# Patient Record
Sex: FEMALE | Race: WHITE | NOT HISPANIC OR LATINO | URBAN - METROPOLITAN AREA
[De-identification: names, ages, dates, MRNs, and addresses within clinical notes are randomized per-mention and may not be internally consistent; named-entity substitution may affect disease eponyms.]

---

## 2019-01-02 ENCOUNTER — HOSPITAL ENCOUNTER (OUTPATIENT)
Dept: OTHER | Facility: HOSPITAL | Age: 59
Setting detail: RECURRING SERIES
Discharge: HOME | End: 2019-02-26
Attending: SPECIALIST | Admitting: SPECIALIST

## 2021-02-04 ENCOUNTER — ON CAMPUS - OUTPATIENT (OUTPATIENT)
Dept: URBAN - METROPOLITAN AREA HOSPITAL 77 | Facility: HOSPITAL | Age: 61
End: 2021-02-04
Payer: COMMERCIAL

## 2021-02-04 DIAGNOSIS — D12.0 BENIGN NEOPLASM OF CECUM: ICD-10-CM

## 2021-02-04 DIAGNOSIS — K63.5 POLYP OF COLON: ICD-10-CM

## 2021-02-04 DIAGNOSIS — D12.3 BENIGN NEOPLASM OF TRANSVERSE COLON: ICD-10-CM

## 2021-02-04 DIAGNOSIS — Z86.010 PERSONAL HISTORY OF COLONIC POLYPS: ICD-10-CM

## 2021-02-04 DIAGNOSIS — D12.2 BENIGN NEOPLASM OF ASCENDING COLON: ICD-10-CM

## 2021-02-04 PROCEDURE — 45385 COLONOSCOPY W/LESION REMOVAL: CPT | Mod: 33 | Performed by: INTERNAL MEDICINE

## 2021-02-04 PROCEDURE — 45380 COLONOSCOPY AND BIOPSY: CPT | Mod: 33,59 | Performed by: INTERNAL MEDICINE

## 2021-02-04 PROCEDURE — 45381 COLONOSCOPY SUBMUCOUS NJX: CPT | Mod: 33 | Performed by: INTERNAL MEDICINE

## 2021-05-10 ENCOUNTER — ON CAMPUS - OUTPATIENT (OUTPATIENT)
Dept: URBAN - METROPOLITAN AREA HOSPITAL 77 | Facility: HOSPITAL | Age: 61
End: 2021-05-10
Payer: COMMERCIAL

## 2021-05-10 DIAGNOSIS — D12.2 BENIGN NEOPLASM OF ASCENDING COLON: ICD-10-CM

## 2021-05-10 DIAGNOSIS — Z86.010 PERSONAL HISTORY OF COLONIC POLYPS: ICD-10-CM

## 2021-05-10 DIAGNOSIS — D12.4 BENIGN NEOPLASM OF DESCENDING COLON: ICD-10-CM

## 2021-05-10 PROCEDURE — 45385 COLONOSCOPY W/LESION REMOVAL: CPT | Mod: 33 | Performed by: INTERNAL MEDICINE

## 2023-01-01 ENCOUNTER — ON CAMPUS - OUTPATIENT (OUTPATIENT)
Dept: URBAN - METROPOLITAN AREA HOSPITAL 85 | Facility: HOSPITAL | Age: 63
End: 2023-01-01

## 2023-01-01 ENCOUNTER — INPATIENT HOSPITAL (OUTPATIENT)
Dept: URBAN - METROPOLITAN AREA HOSPITAL 76 | Facility: HOSPITAL | Age: 63
End: 2023-01-01

## 2023-01-01 ENCOUNTER — INPATIENT HOSPITAL (OUTPATIENT)
Dept: URBAN - METROPOLITAN AREA HOSPITAL 84 | Facility: HOSPITAL | Age: 63
End: 2023-01-01

## 2023-01-01 DIAGNOSIS — K22.89 OTHER SPECIFIED DISEASE OF ESOPHAGUS: ICD-10-CM

## 2023-01-01 DIAGNOSIS — K55.20 ANGIODYSPLASIA OF COLON WITHOUT HEMORRHAGE: ICD-10-CM

## 2023-01-01 DIAGNOSIS — R68.81 EARLY SATIETY: ICD-10-CM

## 2023-01-01 DIAGNOSIS — R63.4 ABNORMAL WEIGHT LOSS: ICD-10-CM

## 2023-01-01 DIAGNOSIS — K92.1 MELENA: ICD-10-CM

## 2023-01-01 DIAGNOSIS — D64.9 ANEMIA, UNSPECIFIED: ICD-10-CM

## 2023-01-01 DIAGNOSIS — K31.89 OTHER DISEASES OF STOMACH AND DUODENUM: ICD-10-CM

## 2023-01-01 DIAGNOSIS — R13.10 DYSPHAGIA, UNSPECIFIED: ICD-10-CM

## 2023-01-01 DIAGNOSIS — D72.829 ELEVATED WHITE BLOOD CELL COUNT, UNSPECIFIED: ICD-10-CM

## 2023-01-01 DIAGNOSIS — R74.01 ELEVATION OF LEVELS OF LIVER TRANSAMINASE LEVELS: ICD-10-CM

## 2023-01-01 DIAGNOSIS — R74.8 ABNORMAL LEVELS OF OTHER SERUM ENZYMES: ICD-10-CM

## 2023-01-01 DIAGNOSIS — E87.6 HYPOKALEMIA: ICD-10-CM

## 2023-01-01 DIAGNOSIS — R93.3 ABNORMAL FINDINGS ON DIAGNOSTIC IMAGING OF OTHER PARTS OF DI: ICD-10-CM

## 2023-01-01 DIAGNOSIS — D62 ACUTE POSTHEMORRHAGIC ANEMIA: ICD-10-CM

## 2023-01-01 DIAGNOSIS — K44.9 DIAPHRAGMATIC HERNIA WITHOUT OBSTRUCTION OR GANGRENE: ICD-10-CM

## 2023-01-01 DIAGNOSIS — R63.30 FEEDING DIFFICULTIES, UNSPECIFIED: ICD-10-CM

## 2023-01-01 PROCEDURE — 44369 SMALL BOWEL ENDOSCOPY: CPT | Performed by: INTERNAL MEDICINE

## 2023-01-01 PROCEDURE — 99222 1ST HOSP IP/OBS MODERATE 55: CPT | Performed by: INTERNAL MEDICINE

## 2023-01-01 PROCEDURE — 99232 SBSQ HOSP IP/OBS MODERATE 35: CPT | Performed by: NURSE PRACTITIONER

## 2023-01-01 PROCEDURE — 43239 EGD BIOPSY SINGLE/MULTIPLE: CPT | Performed by: NURSE PRACTITIONER

## 2023-01-01 PROCEDURE — 99222 1ST HOSP IP/OBS MODERATE 55: CPT

## 2023-09-03 ENCOUNTER — APPOINTMENT (OUTPATIENT)
Dept: CT IMAGING | Facility: HOSPITAL | Age: 63
DRG: 176 | End: 2023-09-03
Payer: COMMERCIAL

## 2023-09-03 ENCOUNTER — HOSPITAL ENCOUNTER (INPATIENT)
Facility: HOSPITAL | Age: 63
LOS: 2 days | Discharge: HOME | DRG: 176 | End: 2023-09-05
Attending: STUDENT IN AN ORGANIZED HEALTH CARE EDUCATION/TRAINING PROGRAM | Admitting: STUDENT IN AN ORGANIZED HEALTH CARE EDUCATION/TRAINING PROGRAM
Payer: COMMERCIAL

## 2023-09-03 DIAGNOSIS — I26.99 BILATERAL PULMONARY EMBOLISM: Primary | ICD-10-CM

## 2023-09-03 DIAGNOSIS — E87.6 HYPOKALEMIA: ICD-10-CM

## 2023-09-03 PROBLEM — C81.90 HODGKIN'S DISEASE IN REMISSION: Status: ACTIVE | Noted: 2023-09-03

## 2023-09-03 PROBLEM — F41.8 ANXIETY ASSOCIATED WITH DEPRESSION: Status: ACTIVE | Noted: 2023-09-03

## 2023-09-03 PROBLEM — E78.5 HLD (HYPERLIPIDEMIA): Status: ACTIVE | Noted: 2023-09-03

## 2023-09-03 PROBLEM — K52.9 COLITIS: Status: ACTIVE | Noted: 2023-09-03

## 2023-09-03 PROBLEM — N39.0 ACUTE UTI (URINARY TRACT INFECTION): Status: ACTIVE | Noted: 2023-09-03

## 2023-09-03 PROBLEM — I10 HTN (HYPERTENSION): Status: ACTIVE | Noted: 2023-09-03

## 2023-09-03 PROBLEM — Z86.73 HISTORY OF CVA IN ADULTHOOD: Status: ACTIVE | Noted: 2023-09-03

## 2023-09-03 PROBLEM — R13.10 DYSPHAGIA: Status: ACTIVE | Noted: 2023-09-03

## 2023-09-03 PROBLEM — K21.9 GERD (GASTROESOPHAGEAL REFLUX DISEASE): Status: ACTIVE | Noted: 2023-09-03

## 2023-09-03 LAB
ALBUMIN SERPL-MCNC: 2.7 G/DL (ref 3.5–5.2)
ALBUMIN/GLOB SERPL: 0.8 G/DL
ALP SERPL-CCNC: 112 U/L (ref 39–117)
ALT SERPL W P-5'-P-CCNC: 16 U/L (ref 1–33)
ANION GAP SERPL CALCULATED.3IONS-SCNC: 8.4 MMOL/L (ref 5–15)
ANISOCYTOSIS BLD QL: ABNORMAL
APTT PPP: 30.5 SECONDS (ref 61–76.5)
AST SERPL-CCNC: 20 U/L (ref 1–32)
BILIRUB SERPL-MCNC: 0.5 MG/DL (ref 0–1.2)
BILIRUB UR QL STRIP: ABNORMAL
BUN SERPL-MCNC: 11 MG/DL (ref 8–23)
BUN/CREAT SERPL: 13.3 (ref 7–25)
BURR CELLS BLD QL SMEAR: ABNORMAL
CALCIUM SPEC-SCNC: 8.1 MG/DL (ref 8.6–10.5)
CHLORIDE SERPL-SCNC: 100 MMOL/L (ref 98–107)
CLARITY UR: ABNORMAL
CO2 SERPL-SCNC: 24.6 MMOL/L (ref 22–29)
COLOR UR: ABNORMAL
CREAT SERPL-MCNC: 0.83 MG/DL (ref 0.57–1)
D DIMER PPP FEU-MCNC: 0.95 MCGFEU/ML (ref 0–0.64)
DEPRECATED RDW RBC AUTO: 49.7 FL (ref 37–54)
EGFRCR SERPLBLD CKD-EPI 2021: 78.8 ML/MIN/1.73
ERYTHROCYTE [DISTWIDTH] IN BLOOD BY AUTOMATED COUNT: 15.4 % (ref 12.3–15.4)
FLUAV SUBTYP SPEC NAA+PROBE: NOT DETECTED
FLUBV RNA ISLT QL NAA+PROBE: NOT DETECTED
GEN 5 2HR TROPONIN T REFLEX: 18 NG/L
GLOBULIN UR ELPH-MCNC: 3.3 GM/DL
GLUCOSE SERPL-MCNC: 112 MG/DL (ref 65–99)
GLUCOSE UR STRIP-MCNC: NEGATIVE MG/DL
HCT VFR BLD AUTO: 37.2 % (ref 34–46.6)
HGB BLD-MCNC: 12 G/DL (ref 12–15.9)
HGB UR QL STRIP.AUTO: NEGATIVE
INR PPP: 1 (ref 0.9–1.1)
KETONES UR QL STRIP: ABNORMAL
LEUKOCYTE ESTERASE UR QL STRIP.AUTO: NEGATIVE
LIPASE SERPL-CCNC: 24 U/L (ref 13–60)
LYMPHOCYTES # BLD MANUAL: 9.86 10*3/MM3 (ref 0.7–3.1)
LYMPHOCYTES NFR BLD MANUAL: 2 % (ref 5–12)
MAGNESIUM SERPL-MCNC: 1.8 MG/DL (ref 1.6–2.4)
MCH RBC QN AUTO: 28.4 PG (ref 26.6–33)
MCHC RBC AUTO-ENTMCNC: 32.3 G/DL (ref 31.5–35.7)
MCV RBC AUTO: 87.9 FL (ref 79–97)
MONOCYTES # BLD: 0.23 10*3/MM3 (ref 0.1–0.9)
NEUTROPHILS # BLD AUTO: 1.51 10*3/MM3 (ref 1.7–7)
NEUTROPHILS NFR BLD MANUAL: 13 % (ref 42.7–76)
NITRITE UR QL STRIP: POSITIVE
NT-PROBNP SERPL-MCNC: 256.7 PG/ML (ref 0–900)
PATHOLOGY REVIEW: YES
PH UR STRIP.AUTO: 7 [PH] (ref 5–8)
PLAT MORPH BLD: NORMAL
PLATELET # BLD AUTO: 330 10*3/MM3 (ref 140–450)
PMV BLD AUTO: 10.1 FL (ref 6–12)
POTASSIUM SERPL-SCNC: 2.9 MMOL/L (ref 3.5–5.2)
PROT SERPL-MCNC: 6 G/DL (ref 6–8.5)
PROT UR QL STRIP: ABNORMAL
PROTHROMBIN TIME: 14.3 SECONDS
QT INTERVAL: 373 MS
QTC INTERVAL: 495 MS
RBC # BLD AUTO: 4.23 10*6/MM3 (ref 3.77–5.28)
SARS-COV-2 RNA RESP QL NAA+PROBE: NOT DETECTED
SMUDGE CELLS BLD QL SMEAR: ABNORMAL
SODIUM SERPL-SCNC: 133 MMOL/L (ref 136–145)
SP GR UR STRIP: 1.01 (ref 1–1.03)
TROPONIN T DELTA: -1 NG/L
TROPONIN T SERPL HS-MCNC: 19 NG/L
UROBILINOGEN UR QL STRIP: ABNORMAL
VARIANT LYMPHS NFR BLD MANUAL: 2 % (ref 0–5)
VARIANT LYMPHS NFR BLD MANUAL: 83 % (ref 19.6–45.3)
WBC NRBC COR # BLD: 11.6 10*3/MM3 (ref 3.4–10.8)

## 2023-09-03 PROCEDURE — 71275 CT ANGIOGRAPHY CHEST: CPT

## 2023-09-03 PROCEDURE — 84484 ASSAY OF TROPONIN QUANT: CPT | Performed by: STUDENT IN AN ORGANIZED HEALTH CARE EDUCATION/TRAINING PROGRAM

## 2023-09-03 PROCEDURE — 25010000002 CEFTRIAXONE PER 250 MG: Performed by: STUDENT IN AN ORGANIZED HEALTH CARE EDUCATION/TRAINING PROGRAM

## 2023-09-03 PROCEDURE — 81003 URINALYSIS AUTO W/O SCOPE: CPT | Performed by: STUDENT IN AN ORGANIZED HEALTH CARE EDUCATION/TRAINING PROGRAM

## 2023-09-03 PROCEDURE — 85025 COMPLETE CBC W/AUTO DIFF WBC: CPT | Performed by: STUDENT IN AN ORGANIZED HEALTH CARE EDUCATION/TRAINING PROGRAM

## 2023-09-03 PROCEDURE — 83880 ASSAY OF NATRIURETIC PEPTIDE: CPT | Performed by: STUDENT IN AN ORGANIZED HEALTH CARE EDUCATION/TRAINING PROGRAM

## 2023-09-03 PROCEDURE — 85730 THROMBOPLASTIN TIME PARTIAL: CPT | Performed by: STUDENT IN AN ORGANIZED HEALTH CARE EDUCATION/TRAINING PROGRAM

## 2023-09-03 PROCEDURE — 85610 PROTHROMBIN TIME: CPT

## 2023-09-03 PROCEDURE — 87636 SARSCOV2 & INF A&B AMP PRB: CPT | Performed by: STUDENT IN AN ORGANIZED HEALTH CARE EDUCATION/TRAINING PROGRAM

## 2023-09-03 PROCEDURE — 25510000001 IOPAMIDOL PER 1 ML: Performed by: STUDENT IN AN ORGANIZED HEALTH CARE EDUCATION/TRAINING PROGRAM

## 2023-09-03 PROCEDURE — 83690 ASSAY OF LIPASE: CPT | Performed by: STUDENT IN AN ORGANIZED HEALTH CARE EDUCATION/TRAINING PROGRAM

## 2023-09-03 PROCEDURE — 83735 ASSAY OF MAGNESIUM: CPT | Performed by: STUDENT IN AN ORGANIZED HEALTH CARE EDUCATION/TRAINING PROGRAM

## 2023-09-03 PROCEDURE — 93005 ELECTROCARDIOGRAM TRACING: CPT | Performed by: STUDENT IN AN ORGANIZED HEALTH CARE EDUCATION/TRAINING PROGRAM

## 2023-09-03 PROCEDURE — 0 POTASSIUM CHLORIDE 10 MEQ/100ML SOLUTION: Performed by: STUDENT IN AN ORGANIZED HEALTH CARE EDUCATION/TRAINING PROGRAM

## 2023-09-03 PROCEDURE — 74177 CT ABD & PELVIS W/CONTRAST: CPT

## 2023-09-03 PROCEDURE — 85007 BL SMEAR W/DIFF WBC COUNT: CPT | Performed by: STUDENT IN AN ORGANIZED HEALTH CARE EDUCATION/TRAINING PROGRAM

## 2023-09-03 PROCEDURE — 36415 COLL VENOUS BLD VENIPUNCTURE: CPT

## 2023-09-03 PROCEDURE — 85379 FIBRIN DEGRADATION QUANT: CPT | Performed by: STUDENT IN AN ORGANIZED HEALTH CARE EDUCATION/TRAINING PROGRAM

## 2023-09-03 PROCEDURE — 99285 EMERGENCY DEPT VISIT HI MDM: CPT

## 2023-09-03 PROCEDURE — 80053 COMPREHEN METABOLIC PANEL: CPT | Performed by: STUDENT IN AN ORGANIZED HEALTH CARE EDUCATION/TRAINING PROGRAM

## 2023-09-03 PROCEDURE — 25010000002 HEPARIN (PORCINE) 25000-0.45 UT/250ML-% SOLUTION: Performed by: STUDENT IN AN ORGANIZED HEALTH CARE EDUCATION/TRAINING PROGRAM

## 2023-09-03 RX ORDER — ATORVASTATIN CALCIUM 40 MG/1
40 TABLET, FILM COATED ORAL NIGHTLY
COMMUNITY

## 2023-09-03 RX ORDER — CETIRIZINE HYDROCHLORIDE 10 MG/1
10 TABLET ORAL DAILY
COMMUNITY

## 2023-09-03 RX ORDER — HEPARIN SODIUM 10000 [USP'U]/100ML
17 INJECTION, SOLUTION INTRAVENOUS
Status: DISPENSED | OUTPATIENT
Start: 2023-09-03 | End: 2023-09-04

## 2023-09-03 RX ORDER — SODIUM CHLORIDE 9 MG/ML
40 INJECTION, SOLUTION INTRAVENOUS AS NEEDED
Status: DISCONTINUED | OUTPATIENT
Start: 2023-09-03 | End: 2023-09-05 | Stop reason: HOSPADM

## 2023-09-03 RX ORDER — POTASSIUM CHLORIDE 7.45 MG/ML
10 INJECTION INTRAVENOUS ONCE
Status: COMPLETED | OUTPATIENT
Start: 2023-09-03 | End: 2023-09-03

## 2023-09-03 RX ORDER — ONDANSETRON 2 MG/ML
4 INJECTION INTRAMUSCULAR; INTRAVENOUS EVERY 6 HOURS PRN
Status: DISCONTINUED | OUTPATIENT
Start: 2023-09-03 | End: 2023-09-05 | Stop reason: HOSPADM

## 2023-09-03 RX ORDER — SERTRALINE HYDROCHLORIDE 100 MG/1
100 TABLET, FILM COATED ORAL DAILY
COMMUNITY

## 2023-09-03 RX ORDER — POLYETHYLENE GLYCOL 3350 17 G/17G
17 POWDER, FOR SOLUTION ORAL DAILY PRN
Status: DISCONTINUED | OUTPATIENT
Start: 2023-09-03 | End: 2023-09-05 | Stop reason: HOSPADM

## 2023-09-03 RX ORDER — SODIUM CHLORIDE 0.9 % (FLUSH) 0.9 %
10 SYRINGE (ML) INJECTION AS NEEDED
Status: DISCONTINUED | OUTPATIENT
Start: 2023-09-03 | End: 2023-09-05 | Stop reason: HOSPADM

## 2023-09-03 RX ORDER — PANTOPRAZOLE SODIUM 40 MG/10ML
40 INJECTION, POWDER, LYOPHILIZED, FOR SOLUTION INTRAVENOUS
Status: DISCONTINUED | OUTPATIENT
Start: 2023-09-04 | End: 2023-09-05

## 2023-09-03 RX ORDER — BISACODYL 5 MG/1
5 TABLET, DELAYED RELEASE ORAL DAILY PRN
Status: DISCONTINUED | OUTPATIENT
Start: 2023-09-03 | End: 2023-09-05 | Stop reason: HOSPADM

## 2023-09-03 RX ORDER — SODIUM CHLORIDE 0.9 % (FLUSH) 0.9 %
10 SYRINGE (ML) INJECTION EVERY 12 HOURS SCHEDULED
Status: DISCONTINUED | OUTPATIENT
Start: 2023-09-03 | End: 2023-09-05 | Stop reason: HOSPADM

## 2023-09-03 RX ORDER — POTASSIUM CHLORIDE 20 MEQ/1
40 TABLET, EXTENDED RELEASE ORAL ONCE
Status: COMPLETED | OUTPATIENT
Start: 2023-09-03 | End: 2023-09-03

## 2023-09-03 RX ORDER — CLOPIDOGREL BISULFATE 75 MG/1
75 TABLET ORAL DAILY
COMMUNITY

## 2023-09-03 RX ORDER — OXYCODONE HYDROCHLORIDE 5 MG/1
5 TABLET ORAL EVERY 4 HOURS PRN
Status: DISCONTINUED | OUTPATIENT
Start: 2023-09-03 | End: 2023-09-05 | Stop reason: HOSPADM

## 2023-09-03 RX ORDER — LISINOPRIL AND HYDROCHLOROTHIAZIDE 20; 12.5 MG/1; MG/1
1 TABLET ORAL DAILY
COMMUNITY

## 2023-09-03 RX ORDER — SODIUM CHLORIDE, SODIUM LACTATE, POTASSIUM CHLORIDE, CALCIUM CHLORIDE 600; 310; 30; 20 MG/100ML; MG/100ML; MG/100ML; MG/100ML
50 INJECTION, SOLUTION INTRAVENOUS CONTINUOUS
Status: DISCONTINUED | OUTPATIENT
Start: 2023-09-03 | End: 2023-09-05 | Stop reason: HOSPADM

## 2023-09-03 RX ORDER — BISACODYL 10 MG
10 SUPPOSITORY, RECTAL RECTAL DAILY PRN
Status: DISCONTINUED | OUTPATIENT
Start: 2023-09-03 | End: 2023-09-05 | Stop reason: HOSPADM

## 2023-09-03 RX ORDER — TEMAZEPAM 15 MG/1
15 CAPSULE ORAL NIGHTLY PRN
Status: DISCONTINUED | OUTPATIENT
Start: 2023-09-03 | End: 2023-09-05 | Stop reason: HOSPADM

## 2023-09-03 RX ORDER — ONDANSETRON 4 MG/1
4 TABLET, FILM COATED ORAL EVERY 6 HOURS PRN
Status: DISCONTINUED | OUTPATIENT
Start: 2023-09-03 | End: 2023-09-05 | Stop reason: HOSPADM

## 2023-09-03 RX ORDER — AMOXICILLIN 250 MG
2 CAPSULE ORAL 2 TIMES DAILY
Status: DISCONTINUED | OUTPATIENT
Start: 2023-09-03 | End: 2023-09-05 | Stop reason: HOSPADM

## 2023-09-03 RX ORDER — FUROSEMIDE 10 MG/ML
40 INJECTION INTRAMUSCULAR; INTRAVENOUS DAILY
Status: COMPLETED | OUTPATIENT
Start: 2023-09-04 | End: 2023-09-05

## 2023-09-03 RX ORDER — ASPIRIN 81 MG/1
81 TABLET ORAL DAILY
Status: ON HOLD | COMMUNITY
End: 2023-09-04

## 2023-09-03 RX ORDER — LABETALOL HYDROCHLORIDE 5 MG/ML
10 INJECTION, SOLUTION INTRAVENOUS EVERY 4 HOURS PRN
Status: DISCONTINUED | OUTPATIENT
Start: 2023-09-03 | End: 2023-09-05 | Stop reason: HOSPADM

## 2023-09-03 RX ORDER — ACETAMINOPHEN 325 MG/1
650 TABLET ORAL EVERY 4 HOURS PRN
Status: DISCONTINUED | OUTPATIENT
Start: 2023-09-03 | End: 2023-09-05 | Stop reason: HOSPADM

## 2023-09-03 RX ADMIN — POTASSIUM CHLORIDE 40 MEQ: 1500 TABLET, EXTENDED RELEASE ORAL at 17:02

## 2023-09-03 RX ADMIN — CEFTRIAXONE 1000 MG: 1 INJECTION, POWDER, FOR SOLUTION INTRAMUSCULAR; INTRAVENOUS at 19:13

## 2023-09-03 RX ADMIN — POTASSIUM CHLORIDE 10 MEQ: 7.46 INJECTION, SOLUTION INTRAVENOUS at 17:03

## 2023-09-03 RX ADMIN — SODIUM CHLORIDE 1000 ML: 9 INJECTION, SOLUTION INTRAVENOUS at 16:22

## 2023-09-03 RX ADMIN — Medication 10 ML: at 22:51

## 2023-09-03 RX ADMIN — SENNOSIDES AND DOCUSATE SODIUM 2 TABLET: 50; 8.6 TABLET ORAL at 22:50

## 2023-09-03 RX ADMIN — HEPARIN SODIUM 17 UNITS/KG/HR: 10000 INJECTION, SOLUTION INTRAVENOUS at 18:03

## 2023-09-03 RX ADMIN — IOPAMIDOL 100 ML: 755 INJECTION, SOLUTION INTRAVENOUS at 17:11

## 2023-09-03 RX ADMIN — ALUMINUM HYDROXIDE, MAGNESIUM HYDROXIDE, AND DIMETHICONE: 400; 400; 40 SUSPENSION ORAL at 22:51

## 2023-09-03 NOTE — [facility] PROVIDER NOTE
Subjective   History of Present Illness  Patient is a 63-year-old female who presents emergency department for multiple complaints including fatigue, decreased appetite, weight loss.  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  Patient states for the last 6 weeks she has not been feeling well.  She states she has had decreased appetite, 18 pound weight loss, fatigue, intermittent episodes of vomiting and diarrhea.  She denies fever, chills, chest pain.  She does report some exertional dyspnea and shortness of breath, but denies cough, fever, chills, chest pain.  Patient denies any history of DVT/PE.  She does report some increased swelling in her lower legs, but she attributes that to not being able to take her home medications due to her decreased appetite.    Review of Systems   Constitutional:  Positive for fatigue and unexpected weight change. Negative for activity change and fever.   HENT:  Negative for congestion, rhinorrhea and sore throat.    Respiratory:  Positive for shortness of breath. Negative for cough.    Cardiovascular:  Negative for chest pain.   Gastrointestinal:  Positive for abdominal pain, diarrhea and nausea. Negative for vomiting.   Genitourinary:  Negative for dysuria.   Musculoskeletal:  Negative for back pain and myalgias.   Skin:  Negative for rash.   Neurological:  Negative for dizziness, light-headedness and headaches.   Psychiatric/Behavioral:  Negative for confusion.      History reviewed. No pertinent past medical history.    No Known Allergies    History reviewed. No pertinent surgical history.    History reviewed. No pertinent family history.    Social History     Socioeconomic History    Marital status:    Tobacco Use    Smoking status: Never    Smokeless tobacco: Never   Vaping Use    Vaping Use: Never used   Substance and Sexual Activity    Alcohol use: Never    Drug use: Never    Sexual  activity: Defer           Objective   Physical Exam  Vitals and nursing note reviewed.   Constitutional:       General: She is not in acute distress.     Appearance: Normal appearance. She is obese. She is not ill-appearing.   HENT:      Head: Normocephalic and atraumatic.      Right Ear: Tympanic membrane normal.      Left Ear: Tympanic membrane normal.      Nose: Nose normal. No congestion.      Mouth/Throat:      Mouth: Mucous membranes are moist.      Pharynx: No oropharyngeal exudate.   Eyes:      Conjunctiva/sclera: Conjunctivae normal.   Cardiovascular:      Rate and Rhythm: Regular rhythm. Tachycardia present. FrequentExtrasystoles are present.     Heart sounds: No murmur heard.  Pulmonary:      Effort: Pulmonary effort is normal.      Breath sounds: No wheezing, rhonchi or rales.   Abdominal:      General: Abdomen is flat.      Palpations: Abdomen is soft.      Tenderness: There is no abdominal tenderness. There is no guarding or rebound.   Musculoskeletal:         General: No swelling or tenderness. Normal range of motion.      Cervical back: Normal range of motion and neck supple.      Right lower le+ Pitting Edema present.      Left lower le+ Pitting Edema present.   Skin:     General: Skin is warm and dry.      Findings: No rash.   Neurological:      General: No focal deficit present.      Mental Status: She is alert and oriented to person, place, and time.       Procedures           ED Course  ED Course as of 23 1902   Sun Sep 03, 2023   1601 ECG 12 Lead Other; fatigue  Sinus tachycardia rate 106, borderline left axis deviation, PVCs, right bundle branch block, ST depression anterolateral leads. [CO]   1611 WBC(!): 11.60 [CO]   1611 Hemoglobin: 12.0 [CO]   1641 Nitrite, UA(!): Positive [CO]   1642 Leukocytes, UA: Negative [CO]   1737 CT Angiogram Chest Pulmonary Embolism  Impression:     1. Multiple bilateral pulmonary emboli. No evidence of right heart strain.  2. Noncalcified 12 mm  nodule within the left lower lobe. PET/CT scan would be recommended for further evaluation [CO]   1753 CT Abdomen Pelvis With Contrast  Impression:     1. Multiple lower lobe pulmonary emboli. 212 mm noncalcified pulmonary nodule within the left lower lobe. PET/CT scan would be recommended for further evaluation.  3. Bilateral adrenal nodules which are statistically adenomas however given the presence of the pulmonary nodule, these can be evaluated on future PET/CT.  4. Segmental circumferential bowel wall thickening involving the distal descending colon. Colon proximal to this is mildly distended. This could represent a short segment stricture. Consider colonoscopy for further evaluation. Additionally there is bowel   wall thickening involving the splenic flexure of the colon consistent with changes of colitis which may be infectious or inflammatory in etiology. [CO]      ED Course User Index  [CO] CidraJustine, DO                                           Medical Decision Making  HAS-BLED Score for Major Bleeding Risk - MDCalc  Calculated on Sep 03 2023 5:49 PM  2 points -> Risk was 4.1% in one validation study (Lip 2011) and 1.88 bleeds per 100 patient-years in another validation study (Pisters 2010). Anticoagulation can be considered, however patient does have moderate risk for major bleeding (~2/100 patient-years).    Patient is a 63-year-old female presented to the emergency department for multiple complaints.  HPI as listed above.  On arrival she is afebrile, mildly tachycardic but otherwise hemodynamically stable in no acute distress.  Physical examination she is obese, no focal findings other than some mild lower extremity bilateral edema.  Patient placed on cardiac monitor, IV established.  Differential diagnosis includes but is not limited to CHF exacerbation, pneumonia, gastroenteritis COVID, flu.  EKG is tachycardia with no ischemic changes.  Lab work showed hypokalemia of 2.9, this was replaced both  p.o. and IV.  Normal kidney function.  Initial troponin is 10, repeat is 18.  BNP is in the 200s and is not consistent for acute CHF exacerbation.  D-dimer was elevated, CTA was obtained that does show bilateral pulmonary embolism without evidence of right heart strain.  Given BNP is not elevated this also goes along with no right ventricular heart strain.  Has bled score is moderate risk, but will place on heparin and admit for further work-up for bilateral PEs.        Problems Addressed:  Bilateral pulmonary embolism: complicated acute illness or injury  Hypokalemia: complicated acute illness or injury    Amount and/or Complexity of Data Reviewed  Labs: ordered. Decision-making details documented in ED Course.  Radiology: ordered. Decision-making details documented in ED Course.  ECG/medicine tests: ordered. Decision-making details documented in ED Course.    Risk  Prescription drug management.  Decision regarding hospitalization.        Final diagnoses:   Bilateral pulmonary embolism   Hypokalemia       ED Disposition  ED Disposition       ED Disposition   Decision to Admit    Condition   --    Comment   Level of Care: Telemetry [5]   Admitting Physician: TATIANA CROSS [[id number]]   Attending Physician: TATIANA CROSS [[id number]]   Patient Class: Inpatient [101]                 No follow-up provider specified.       Medication List      No changes were made to your prescriptions during this visit.

## 2023-09-04 ENCOUNTER — APPOINTMENT (OUTPATIENT)
Dept: CARDIOLOGY | Facility: HOSPITAL | Age: 63
DRG: 176 | End: 2023-09-04
Payer: COMMERCIAL

## 2023-09-04 PROBLEM — R91.1 PULMONARY NODULE 1 CM OR GREATER IN DIAMETER: Status: ACTIVE | Noted: 2023-09-04

## 2023-09-04 PROBLEM — E27.8 ADRENAL NODULE: Status: ACTIVE | Noted: 2023-09-04

## 2023-09-04 LAB
ALBUMIN SERPL-MCNC: 2.4 G/DL (ref 3.5–5.2)
ALBUMIN/GLOB SERPL: 0.8 G/DL
ALP SERPL-CCNC: 99 U/L (ref 39–117)
ALT SERPL W P-5'-P-CCNC: 16 U/L (ref 1–33)
ANION GAP SERPL CALCULATED.3IONS-SCNC: 9 MMOL/L (ref 5–15)
ANISOCYTOSIS BLD QL: ABNORMAL
APTT PPP: 137.1 SECONDS (ref 61–76.5)
APTT PPP: 68 SECONDS (ref 61–76.5)
APTT PPP: 70.4 SECONDS (ref 61–76.5)
AST SERPL-CCNC: 20 U/L (ref 1–32)
BH CV LOWER VASCULAR LEFT COMMON FEMORAL AUGMENT: NORMAL
BH CV LOWER VASCULAR LEFT COMMON FEMORAL COMPETENT: NORMAL
BH CV LOWER VASCULAR LEFT COMMON FEMORAL COMPRESS: NORMAL
BH CV LOWER VASCULAR LEFT COMMON FEMORAL PHASIC: NORMAL
BH CV LOWER VASCULAR LEFT COMMON FEMORAL SPONT: NORMAL
BH CV LOWER VASCULAR LEFT DISTAL FEMORAL COMPRESS: NORMAL
BH CV LOWER VASCULAR LEFT GASTRONEMIUS COMPRESS: NORMAL
BH CV LOWER VASCULAR LEFT GREATER SAPH AK COMPRESS: NORMAL
BH CV LOWER VASCULAR LEFT GREATER SAPH BK COMPRESS: NORMAL
BH CV LOWER VASCULAR LEFT LESSER SAPH COMPRESS: NORMAL
BH CV LOWER VASCULAR LEFT MID FEMORAL AUGMENT: NORMAL
BH CV LOWER VASCULAR LEFT MID FEMORAL COMPETENT: NORMAL
BH CV LOWER VASCULAR LEFT MID FEMORAL COMPRESS: NORMAL
BH CV LOWER VASCULAR LEFT MID FEMORAL PHASIC: NORMAL
BH CV LOWER VASCULAR LEFT MID FEMORAL SPONT: NORMAL
BH CV LOWER VASCULAR LEFT PERONEAL COMPRESS: NORMAL
BH CV LOWER VASCULAR LEFT POPLITEAL AUGMENT: NORMAL
BH CV LOWER VASCULAR LEFT POPLITEAL COMPETENT: NORMAL
BH CV LOWER VASCULAR LEFT POPLITEAL COMPRESS: NORMAL
BH CV LOWER VASCULAR LEFT POPLITEAL PHASIC: NORMAL
BH CV LOWER VASCULAR LEFT POPLITEAL SPONT: NORMAL
BH CV LOWER VASCULAR LEFT POSTERIOR TIBIAL COMPRESS: NORMAL
BH CV LOWER VASCULAR LEFT PROXIMAL FEMORAL COMPRESS: NORMAL
BH CV LOWER VASCULAR LEFT SAPHENOFEMORAL JUNCTION COMPRESS: NORMAL
BH CV LOWER VASCULAR RIGHT COMMON FEMORAL AUGMENT: NORMAL
BH CV LOWER VASCULAR RIGHT COMMON FEMORAL COMPETENT: NORMAL
BH CV LOWER VASCULAR RIGHT COMMON FEMORAL COMPRESS: NORMAL
BH CV LOWER VASCULAR RIGHT COMMON FEMORAL PHASIC: NORMAL
BH CV LOWER VASCULAR RIGHT COMMON FEMORAL SPONT: NORMAL
BH CV LOWER VASCULAR RIGHT DISTAL FEMORAL COMPRESS: NORMAL
BH CV LOWER VASCULAR RIGHT GASTRONEMIUS COMPRESS: NORMAL
BH CV LOWER VASCULAR RIGHT GREATER SAPH AK COMPRESS: NORMAL
BH CV LOWER VASCULAR RIGHT GREATER SAPH BK COMPRESS: NORMAL
BH CV LOWER VASCULAR RIGHT LESSER SAPH COMPRESS: NORMAL
BH CV LOWER VASCULAR RIGHT MID FEMORAL AUGMENT: NORMAL
BH CV LOWER VASCULAR RIGHT MID FEMORAL COMPETENT: NORMAL
BH CV LOWER VASCULAR RIGHT MID FEMORAL COMPRESS: NORMAL
BH CV LOWER VASCULAR RIGHT MID FEMORAL PHASIC: NORMAL
BH CV LOWER VASCULAR RIGHT MID FEMORAL SPONT: NORMAL
BH CV LOWER VASCULAR RIGHT PERONEAL COMPRESS: NORMAL
BH CV LOWER VASCULAR RIGHT POPLITEAL AUGMENT: NORMAL
BH CV LOWER VASCULAR RIGHT POPLITEAL COMPETENT: NORMAL
BH CV LOWER VASCULAR RIGHT POPLITEAL COMPRESS: NORMAL
BH CV LOWER VASCULAR RIGHT POPLITEAL PHASIC: NORMAL
BH CV LOWER VASCULAR RIGHT POPLITEAL SPONT: NORMAL
BH CV LOWER VASCULAR RIGHT POSTERIOR TIBIAL COMPRESS: NORMAL
BH CV LOWER VASCULAR RIGHT PROXIMAL FEMORAL COMPRESS: NORMAL
BH CV LOWER VASCULAR RIGHT SAPHENOFEMORAL JUNCTION COMPRESS: NORMAL
BILIRUB SERPL-MCNC: 0.4 MG/DL (ref 0–1.2)
BUN SERPL-MCNC: 10 MG/DL (ref 8–23)
BUN/CREAT SERPL: 15.6 (ref 7–25)
BURR CELLS BLD QL SMEAR: ABNORMAL
CALCIUM SPEC-SCNC: 8 MG/DL (ref 8.6–10.5)
CHLORIDE SERPL-SCNC: 105 MMOL/L (ref 98–107)
CO2 SERPL-SCNC: 22 MMOL/L (ref 22–29)
CREAT SERPL-MCNC: 0.64 MG/DL (ref 0.57–1)
CRP SERPL-MCNC: 4.78 MG/DL (ref 0–0.5)
DEPRECATED RDW RBC AUTO: 48.6 FL (ref 37–54)
EGFRCR SERPLBLD CKD-EPI 2021: 98.8 ML/MIN/1.73
ERYTHROCYTE [DISTWIDTH] IN BLOOD BY AUTOMATED COUNT: 15.3 % (ref 12.3–15.4)
ERYTHROCYTE [SEDIMENTATION RATE] IN BLOOD: 3 MM/HR (ref 0–30)
GLOBULIN UR ELPH-MCNC: 2.9 GM/DL
GLUCOSE SERPL-MCNC: 118 MG/DL (ref 65–99)
HCT VFR BLD AUTO: 34.5 % (ref 34–46.6)
HGB BLD-MCNC: 11.3 G/DL (ref 12–15.9)
LYMPHOCYTES # BLD MANUAL: 5.56 10*3/MM3 (ref 0.7–3.1)
LYMPHOCYTES NFR BLD MANUAL: 22 % (ref 5–12)
MCH RBC QN AUTO: 28.9 PG (ref 26.6–33)
MCHC RBC AUTO-ENTMCNC: 32.8 G/DL (ref 31.5–35.7)
MCV RBC AUTO: 88.1 FL (ref 79–97)
METAMYELOCYTES NFR BLD MANUAL: 1 % (ref 0–0)
MONOCYTES # BLD: 2.27 10*3/MM3 (ref 0.1–0.9)
NEUTROPHILS # BLD AUTO: 2.37 10*3/MM3 (ref 1.7–7)
NEUTROPHILS NFR BLD MANUAL: 14 % (ref 42.7–76)
NEUTS BAND NFR BLD MANUAL: 9 % (ref 0–5)
NRBC SPEC MANUAL: 1 /100 WBC (ref 0–0.2)
PLAT MORPH BLD: NORMAL
PLATELET # BLD AUTO: 284 10*3/MM3 (ref 140–450)
PMV BLD AUTO: 9.5 FL (ref 6–12)
POIKILOCYTOSIS BLD QL SMEAR: ABNORMAL
POTASSIUM SERPL-SCNC: 3.8 MMOL/L (ref 3.5–5.2)
PROT SERPL-MCNC: 5.3 G/DL (ref 6–8.5)
RBC # BLD AUTO: 3.92 10*6/MM3 (ref 3.77–5.28)
SCAN SLIDE: NORMAL
SMUDGE CELLS BLD QL SMEAR: ABNORMAL
SODIUM SERPL-SCNC: 136 MMOL/L (ref 136–145)
VARIANT LYMPHS NFR BLD MANUAL: 2 % (ref 0–5)
VARIANT LYMPHS NFR BLD MANUAL: 52 % (ref 19.6–45.3)
WBC NRBC COR # BLD: 10.3 10*3/MM3 (ref 3.4–10.8)

## 2023-09-04 PROCEDURE — 93970 EXTREMITY STUDY: CPT

## 2023-09-04 PROCEDURE — 85730 THROMBOPLASTIN TIME PARTIAL: CPT | Performed by: STUDENT IN AN ORGANIZED HEALTH CARE EDUCATION/TRAINING PROGRAM

## 2023-09-04 PROCEDURE — 25010000002 FUROSEMIDE PER 20 MG: Performed by: STUDENT IN AN ORGANIZED HEALTH CARE EDUCATION/TRAINING PROGRAM

## 2023-09-04 PROCEDURE — 25010000002 PIPERACILLIN SOD-TAZOBACTAM PER 1 G: Performed by: STUDENT IN AN ORGANIZED HEALTH CARE EDUCATION/TRAINING PROGRAM

## 2023-09-04 PROCEDURE — 86140 C-REACTIVE PROTEIN: CPT

## 2023-09-04 PROCEDURE — 85652 RBC SED RATE AUTOMATED: CPT

## 2023-09-04 PROCEDURE — 93306 TTE W/DOPPLER COMPLETE: CPT | Performed by: INTERNAL MEDICINE

## 2023-09-04 PROCEDURE — 85025 COMPLETE CBC W/AUTO DIFF WBC: CPT | Performed by: STUDENT IN AN ORGANIZED HEALTH CARE EDUCATION/TRAINING PROGRAM

## 2023-09-04 PROCEDURE — 93306 TTE W/DOPPLER COMPLETE: CPT

## 2023-09-04 PROCEDURE — 25010000002 HEPARIN (PORCINE) 25000-0.45 UT/250ML-% SOLUTION: Performed by: STUDENT IN AN ORGANIZED HEALTH CARE EDUCATION/TRAINING PROGRAM

## 2023-09-04 PROCEDURE — 80053 COMPREHEN METABOLIC PANEL: CPT | Performed by: STUDENT IN AN ORGANIZED HEALTH CARE EDUCATION/TRAINING PROGRAM

## 2023-09-04 RX ORDER — METOCLOPRAMIDE 5 MG/1
5 TABLET ORAL
Status: DISCONTINUED | OUTPATIENT
Start: 2023-09-04 | End: 2023-09-05 | Stop reason: HOSPADM

## 2023-09-04 RX ORDER — ATORVASTATIN CALCIUM 40 MG/1
40 TABLET, FILM COATED ORAL NIGHTLY
Status: DISCONTINUED | OUTPATIENT
Start: 2023-09-04 | End: 2023-09-05 | Stop reason: HOSPADM

## 2023-09-04 RX ORDER — CLOPIDOGREL BISULFATE 75 MG/1
75 TABLET ORAL DAILY
Status: DISCONTINUED | OUTPATIENT
Start: 2023-09-04 | End: 2023-09-05 | Stop reason: HOSPADM

## 2023-09-04 RX ORDER — CETIRIZINE HYDROCHLORIDE 10 MG/1
10 TABLET ORAL DAILY
Status: DISCONTINUED | OUTPATIENT
Start: 2023-09-04 | End: 2023-09-05 | Stop reason: HOSPADM

## 2023-09-04 RX ORDER — SERTRALINE HYDROCHLORIDE 100 MG/1
100 TABLET, FILM COATED ORAL DAILY
Status: DISCONTINUED | OUTPATIENT
Start: 2023-09-04 | End: 2023-09-05 | Stop reason: HOSPADM

## 2023-09-04 RX ADMIN — PANTOPRAZOLE SODIUM 40 MG: 40 INJECTION, POWDER, LYOPHILIZED, FOR SOLUTION INTRAVENOUS at 09:28

## 2023-09-04 RX ADMIN — SERTRALINE 100 MG: 100 TABLET, FILM COATED ORAL at 09:44

## 2023-09-04 RX ADMIN — CLOPIDOGREL BISULFATE 75 MG: 75 TABLET ORAL at 14:45

## 2023-09-04 RX ADMIN — ATORVASTATIN CALCIUM 40 MG: 40 TABLET, FILM COATED ORAL at 20:50

## 2023-09-04 RX ADMIN — METOCLOPRAMIDE 5 MG: 5 TABLET ORAL at 17:44

## 2023-09-04 RX ADMIN — PIPERACILLIN AND TAZOBACTAM 4.5 G: 4; .5 INJECTION, POWDER, FOR SOLUTION INTRAVENOUS at 09:27

## 2023-09-04 RX ADMIN — PIPERACILLIN AND TAZOBACTAM 4.5 G: 4; .5 INJECTION, POWDER, FOR SOLUTION INTRAVENOUS at 00:46

## 2023-09-04 RX ADMIN — APIXABAN 10 MG: 5 TABLET, FILM COATED ORAL at 20:50

## 2023-09-04 RX ADMIN — Medication 10 ML: at 20:50

## 2023-09-04 RX ADMIN — PIPERACILLIN AND TAZOBACTAM 4.5 G: 4; .5 INJECTION, POWDER, FOR SOLUTION INTRAVENOUS at 17:44

## 2023-09-04 RX ADMIN — Medication 10 ML: at 09:28

## 2023-09-04 RX ADMIN — SODIUM CHLORIDE, POTASSIUM CHLORIDE, SODIUM LACTATE AND CALCIUM CHLORIDE 50 ML/HR: 600; 310; 30; 20 INJECTION, SOLUTION INTRAVENOUS at 00:56

## 2023-09-04 RX ADMIN — SENNOSIDES AND DOCUSATE SODIUM 2 TABLET: 50; 8.6 TABLET ORAL at 20:50

## 2023-09-04 RX ADMIN — CETIRIZINE HYDROCHLORIDE 10 MG: 10 TABLET, FILM COATED ORAL at 12:40

## 2023-09-04 RX ADMIN — METOCLOPRAMIDE 5 MG: 5 TABLET ORAL at 12:40

## 2023-09-04 RX ADMIN — ACETAMINOPHEN 650 MG: 325 TABLET, FILM COATED ORAL at 00:58

## 2023-09-04 RX ADMIN — FUROSEMIDE 40 MG: 10 INJECTION, SOLUTION INTRAMUSCULAR; INTRAVENOUS at 09:28

## 2023-09-04 RX ADMIN — HEPARIN SODIUM 14 UNITS/KG/HR: 10000 INJECTION, SOLUTION INTRAVENOUS at 12:56

## 2023-09-04 NOTE — PROGRESS NOTES
[facility] Medicine Services   Daily Progress Note    Patient Name: Chelsea Lees  : 1960  MRN: [id number]  Primary Care Physician:  Provider, No Known  Date of admission: 9/3/2023  Date and Time of Service: 23  at 13:50 EDT       Subjective      Chief Complaint: Fatigue and Shortness of Breath       HPI:   Chelsea Lees is a 63 y.o. female with a CMH of hx of CVA, hx of hodgkins in remission, HLD, anxiety/depression, HTN who presented to [facility] on 9/3/2023 with shortness of breath and dysphagia.     Patient states that since  she has been short of breath.  She went to her primary providers and was thought to have a pulmonary process like asthma.  Symptoms grew worse over the months and finally last night, the patient felt like she needed to come to the ED for evaluation.  At [facility] ED, they performed a CTA chest, and found the patient had multiple Pulmonary Emboli.    Patient also states for the last few months she has had early satiety and abdominal fullness, associated with nausea and vomiting. She states she has lost over 15 lbs in the last month. Endorses emesis, nausea, and spitting food back up.  States she started taking OTC prilosec which improved the symptoms.    Interval History:   23 :  Patient was seen at bedside this morning. Patient reports improvement in her shortness of breath. She still has abdominal fullness and early satiety but denies any dysphagia. She  denies any diarrhea or constipation or urinary symptoms.           Objective       Vitals:   Temp:  [97.3 °F (36.3 °C)-100.1 °F (37.8 °C)] 97.3 °F (36.3 °C)  Heart Rate:  [] 83  Resp:  [16-20] 16  BP: (101-127)/(56-90) 103/74    Physical Exam  Constitutional:       Appearance: Normal appearance.   HENT:      Head: Normocephalic and atraumatic.      Nose: Nose normal.      Mouth/Throat:      Mouth: Mucous membranes are moist.   Eyes:      Pupils: Pupils are equal, round, and reactive  to light.   Cardiovascular:      Rate and Rhythm: Normal rate and regular rhythm.      Pulses: Normal pulses.      Heart sounds: Normal heart sounds.   Pulmonary:      Effort: Pulmonary effort is normal.      Breath sounds: Normal breath sounds.   Abdominal:      General: Bowel sounds are normal.      Palpations: Abdomen is soft.   Musculoskeletal:         General: Normal range of motion.      Cervical back: Normal range of motion.   Skin:     General: Skin is warm.      Capillary Refill: Capillary refill takes less than 2 seconds.   Neurological:      General: No focal deficit present.      Mental Status: She is alert.   Psychiatric:         Mood and Affect: Mood normal.           Result Review:  I have personally reviewed the results from the time of this admission to 9/4/2023 13:50 EDT and agree with these findings:  [x]  Laboratory  [x]  Microbiology  []  Radiology  []  EKG/Telemetry   []  Cardiology/Vascular   []  Pathology  []  Old records  []  Other:  Most notable findings include:   CT angio chest (09/03/23):   Impression:   1. Multiple bilateral pulmonary emboli. No evidence of right heart strain.   2. Noncalcified 12 mm nodule within the left lower lobe. PET/CT scan would be recommended for further evaluation.    CT abdomen and pelvis with contrast (09/03/23):   Impression:   1. Multiple lower lobe pulmonary emboli. 212 mm noncalcified pulmonary nodule within the left lower lobe. PET/CT scan would be recommended for further evaluation.   3. Bilateral adrenal nodules which are statistically adenomas however given the presence of the pulmonary nodule, these can be evaluated on future PET/CT.   4. Segmental circumferential bowel wall thickening involving the distal descending colon. Colon proximal to this is mildly distended. This could represent a short segment stricture. Consider colonoscopy for further evaluation. Additionally there is bowel    wall thickening involving the splenic flexure of the colon  consistent with changes of colitis which may be infectious or inflammatory in etiology.     CBC:      Lab 09/04/23  0042 09/03/23  1601   WBC 10.30 11.60*   HEMOGLOBIN 11.3* 12.0   HEMATOCRIT 34.5 37.2   PLATELETS 284 330   NEUTROS ABS 2.37 1.51*   MCV 88.1 87.9      CMP:        Lab 09/04/23  0042 09/03/23  1601   SODIUM 136 133*   POTASSIUM 3.8 2.9*   CHLORIDE 105 100   CO2 22.0 24.6   ANION GAP 9.0 8.4   BUN 10 11   CREATININE 0.64 0.83   EGFR 98.8 78.8   GLUCOSE 118* 112*   CALCIUM 8.0* 8.1*   MAGNESIUM  --  1.8   TOTAL PROTEIN 5.3* 6.0   ALBUMIN 2.4* 2.7*   GLOBULIN 2.9 3.3   ALT (SGPT) 16 16   AST (SGOT) 20 20   BILIRUBIN 0.4 0.5   ALK PHOS 99 112   LIPASE  --  24        Microbiology Results (last 10 days)       Procedure Component Value - Date/Time    COVID-19 and FLU A/B PCR - Swab, Nasopharynx [[id number]]  (Normal) Collected: 09/03/23 1555    Lab Status: Final result Specimen: Swab from Nasopharynx Updated: 09/03/23 1627     COVID19 Not Detected     Influenza A PCR Not Detected     Influenza B PCR Not Detected    Narrative:      Fact sheet for providers: https://www.fda.gov/media/998673/download    Fact sheet for patients: https://www.fda.gov/media/168343/download    Test performed by PCR.                         Assessment & Plan      Brief Patient Summary:  Chelsea Lees is a 63 y.o. female with a CMH of hx of CVA, hx of hodgkins in remission, HLD, anxiety/depression, HTN who presented to [facility] on 9/3/2023 with shortness of breath and dysphagia. Admitted with diagnosis of multiple pulmonary emboli, significant weight loss/Early satiety - r/o GI malignancy.       Current Medications:  atorvastatin, 40 mg, Oral, Nightly  cetirizine, 10 mg, Oral, Daily  furosemide, 40 mg, Intravenous, Daily  metoclopramide, 5 mg, Oral, TID AC  pantoprazole, 40 mg, Intravenous, Q AM  piperacillin-tazobactam, 4.5 g, Intravenous, Q8H  senna-docusate sodium, 2 tablet, Oral, BID  sertraline, 100 mg, Oral,  Daily  sodium chloride, 10 mL, Intravenous, Q12H       heparin, 17 Units/kg/hr, Last Rate: 14 Units/kg/hr (09/04/23 1256)  lactated ringers, 50 mL/hr, Last Rate: 50 mL/hr (09/04/23 0056)         Active Hospital Problems:  Active Hospital Problems    Diagnosis     **Pulmonary emboli     History of CVA in adulthood     Hodgkin's disease in remission     HTN (hypertension)     Dysphagia     GERD (gastroesophageal reflux disease)     HLD (hyperlipidemia)     Anxiety associated with depression     Pulmonary embolus     Colitis     Acute UTI (urinary tract infection)        Plan:   # Multiple Pulmonary Emboli  - Unclear cause -  Patient states she had COVID like symptoms about a month ago - but was never tested positive; Also with h/o early satiety/significant weight loss - need to r/o GI malignancy, Given h/o stroke could be hypercoagulable state - but patient will be started on DOAC on discharge, may need outpatient hypercoagulability workup - will refer to hematology on discharge  - Currently on heparin drip  - Transition to PO eliquis today  - f/u Venous Duplex BLE  - Echo to evaluate for right heart strain and r/o pulmonary hypertension from CTEPH causing chronic dyspnea on exertion.      #  Early Satiety  # Significant weight loss  # ?Dysphagia  - Patient currently denies any dysphagia and has been tolerating PO diet and medications  - She states she has had early satiety, nausea and vomiting for the past few months and has lost about 15-20 lbs in that time.   - GI consulted  - Planned for barium swallow study  - She already has Colonoscopy scheduled in November, will likely need EGD also at that time  - continue PPI    # UTI/Colitis  # suspected colonic stricture  - CT abdomen and pelvis showed  - Segmental circumferential bowel wall thickening involving the distal descending colon, Colon proximal to this is mildly distended, this could represent a short segment stricture. Consider colonoscopy for further  evaluation. Additionally there is bowel wall thickening involving the splenic flexure of the colon consistent with changes of colitis which may be infectious or inflammatory in etiology.  - GI consulted - f/u inflammatory markers, scheduled for colonoscopy in novemeber  - Continue with IV zosyn for now - will likely switch to PO ciprofloxacin and flagyl on discharge to complete 10 days course  - UA +ve but Patient denies any urinary symptoms currently     # Pulmonary Nodule  # Adrenal Nodule  - 12 mm noncalcified pulmonary nodule within the left lower lobe and Bilateral adrenal nodules which are statistically adenomas however given the presence of the pulmonary nodule, these can be evaluated on future PET/CT.   - Outpatiet pulmonology referral on discharge for further evaluation with PET/CT      # Hypertension  - Home meds on hold  - BP borderline  - Resume home meds if BP elevated    # HLD  - c/w Statin    # h/o CVA   - h/o CVA in 2018  - patient on aspirin and plavix at home  - will DC aspirin and continue with plavis as patient is being started on DOAC as well for PE.     # Anxiety/Depression  - resume home meds    DVT prophylaxis:  Medical DVT prophylaxis orders are present. On heparin Gtt.       CODE STATUS:    Level Of Support Discussed With: Patient  Code Status (Patient has no pulse and is not breathing): CPR (Attempt to Resuscitate)  Medical Interventions (Patient has pulse or is breathing): Full Support        Disposition:  I expect patient to be discharged tomorrow, pending Echo, Duplex and barium swallow study.    Discussed the plan of care with the patient. Discussed with patient's RN.     Electronically signed by Sheng Garvey MD, 09/04/23, 13:50 EDT.  [facility] Hospitalist Team

## 2023-09-04 NOTE — H&P
[facility] Medicine Services  History & Physical    Patient Name: Chelsea Lees  : 1960  MRN: [id number]  Primary Care Physician:  Provider, No Known  Date of admission: 9/3/2023  Date and Time of Service: 9/3/2023 at 2130    Subjective      Chief Complaint: Shortness of Breath and Dysphagia    History of Present Illness: Chelsea Lees is a 63 y.o. female with a CMH of hx of CVA, hx of hodgkins in remission, hld, anxiety/depression, HTN who presented to [facility] on 9/3/2023 with shortness of breath and dysphagia.    Patient states that since  she has been short of breath.  She went to her primary providers and was thought to have a pulmonary process like asthma.  Symptoms grew worse over the months and finally last night, the patient felt like she needed to come to the ED for evaluation.  At  ED, they performed a CTA chest, and found the patient had multiple Pe's.    Patient also states for the last few months she has had progressively worsening dysphagia.  She hasn't been able to take any of her medications since Thursday and she states she has lost over 15 lbs in the last month.  Endorses emesis, nausea, and spitting food back up.  States she started taking OTC prilosec which improved the symptoms.      Review of Systems   Constitutional:  Positive for appetite change and fatigue. Negative for chills and fever.   HENT:  Negative for congestion and rhinorrhea.    Eyes:  Negative for visual disturbance.   Respiratory:  Positive for shortness of breath.    Cardiovascular:  Negative for chest pain.   Gastrointestinal:  Positive for nausea and vomiting. Negative for abdominal pain and diarrhea.   Endocrine: Negative for polyuria.   Genitourinary:  Negative for difficulty urinating and dyspareunia.   Musculoskeletal:  Negative for back pain and myalgias.   Skin:  Negative for rash and wound.   Neurological:  Positive for weakness. Negative for numbness and headaches.    Psychiatric/Behavioral:  Negative for agitation, behavioral problems and confusion.      Personal History     History reviewed. No pertinent past medical history.    History reviewed. No pertinent surgical history.    Family History: family history is not on file. Otherwise pertinent FHx was reviewed and not pertinent to current issue.    Social History:  reports that she has never smoked. She has never used smokeless tobacco. She reports that she does not drink alcohol and does not use drugs.    Home Medications:  Prior to Admission Medications       Prescriptions Last Dose Informant Patient Reported? Taking?    aspirin 81 MG EC tablet Past Month  Yes Yes    Take 1 tablet by mouth Daily.    atorvastatin (LIPITOR) 40 MG tablet Past Month  Yes Yes    Take 1 tablet by mouth Every Night.    cetirizine (zyrTEC) 10 MG tablet Past Month  Yes Yes    Take 1 tablet by mouth Daily.    clopidogrel (PLAVIX) 75 MG tablet Past Month  Yes Yes    Take 1 tablet by mouth Daily.    lisinopril-hydrochlorothiazide (PRINZIDE,ZESTORETIC) 20-12.5 MG per tablet Past Month  Yes Yes    Take 1 tablet by mouth Daily.    sertraline (ZOLOFT) 100 MG tablet Past Month  Yes Yes    Take 1 tablet by mouth Daily.              Allergies:  No Known Allergies    Objective      Vitals:   Temp:  [97.9 °F (36.6 °C)-98 °F (36.7 °C)] 98 °F (36.7 °C)  Heart Rate:  [] 97  Resp:  [16-20] 19  BP: (114-127)/(62-90) 122/77  Body mass index is 28.82 kg/m².  Physical Exam  Constitutional:       General: She is not in acute distress.  HENT:      Head: Normocephalic and atraumatic.   Cardiovascular:      Rate and Rhythm: Normal rate and regular rhythm.      Heart sounds: Normal heart sounds.   Pulmonary:      Effort: Pulmonary effort is normal. No respiratory distress.      Breath sounds: Normal breath sounds.   Abdominal:      Palpations: Abdomen is soft.      Tenderness: There is no abdominal tenderness.   Musculoskeletal:      Right lower leg: Edema present.       Left lower leg: Edema present.   Skin:     General: Skin is warm and dry.   Neurological:      General: No focal deficit present.      Mental Status: She is alert and oriented to person, place, and time.   Psychiatric:         Mood and Affect: Mood normal.         Behavior: Behavior normal.       Diagnostic Data:  Lab Results (last 24 hours)       Procedure Component Value Units Date/Time    Manual Differential [[id number]]  (Abnormal) Collected: 09/03/23 1601    Specimen: Blood Updated: 09/03/23 2058     Neutrophil % 13.0 %      Lymphocyte % 83.0 %      Monocyte % 2.0 %      Atypical Lymphocyte % 2.0 %      Neutrophils Absolute 1.51 10*3/mm3      Lymphocytes Absolute 9.86 10*3/mm3      Monocytes Absolute 0.23 10*3/mm3      Anisocytosis Slight/1+     Arboles Cells Slight/1+     Smudge Cells Slight/1+     Platelet Morphology Normal    Path Consult Reflex [[id number]] Collected: 09/03/23 1601    Specimen: Blood Updated: 09/03/23 2058     Pathology Review Yes    aPTT [[id number]]  (Abnormal) Collected: 09/03/23 1801    Specimen: Blood Updated: 09/03/23 1955     PTT 30.5 seconds     High Sensitivity Troponin T 2Hr [[id number]]  (Abnormal) Collected: 09/03/23 1801    Specimen: Blood Updated: 09/03/23 1838     HS Troponin T 18 ng/L      Troponin T Delta -1 ng/L     Narrative:      High Sensitive Troponin T Reference Range:  <10.0 ng/L- Negative Female for AMI  <15.0 ng/L- Negative Male for AMI  >=10 - Abnormal Female indicating possible myocardial injury.  >=15 - Abnormal Male indicating possible myocardial injury.   Clinicians would have to utilize clinical acumen, EKG, Troponin, and serial changes to determine if it is an Acute Myocardial Infarction or myocardial injury due to an underlying chronic condition.         POC Protime / INR [[id number]]  (Normal) Resulted: 09/03/23 1831    Specimen: Blood Updated: 09/03/23 1831     Protime 14.3 seconds      INR 1    Magnesium [[id number]]  (Normal) Collected: 09/03/23 1601     Specimen: Blood Updated: 09/03/23 1659     Magnesium 1.8 mg/dL     High Sensitivity Troponin T [[id number]]  (Abnormal) Collected: 09/03/23 1601    Specimen: Blood Updated: 09/03/23 1645     HS Troponin T 19 ng/L     Narrative:      High Sensitive Troponin T Reference Range:  <10.0 ng/L- Negative Female for AMI  <15.0 ng/L- Negative Male for AMI  >=10 - Abnormal Female indicating possible myocardial injury.  >=15 - Abnormal Male indicating possible myocardial injury.   Clinicians would have to utilize clinical acumen, EKG, Troponin, and serial changes to determine if it is an Acute Myocardial Infarction or myocardial injury due to an underlying chronic condition.         BNP [[id number]]  (Normal) Collected: 09/03/23 1601    Specimen: Blood Updated: 09/03/23 1645     proBNP 256.7 pg/mL     Narrative:      Among patients with dyspnea, NT-proBNP is highly sensitive for the detection of acute congestive heart failure. In addition NT-proBNP of <300 pg/ml effectively rules out acute congestive heart failure with 99% negative predictive value.      Comprehensive Metabolic Panel [[id number]]  (Abnormal) Collected: 09/03/23 1601    Specimen: Blood Updated: 09/03/23 1640     Glucose 112 mg/dL      BUN 11 mg/dL      Creatinine 0.83 mg/dL      Sodium 133 mmol/L      Potassium 2.9 mmol/L      Chloride 100 mmol/L      CO2 24.6 mmol/L      Calcium 8.1 mg/dL      Total Protein 6.0 g/dL      Albumin 2.7 g/dL      ALT (SGPT) 16 U/L      AST (SGOT) 20 U/L      Alkaline Phosphatase 112 U/L      Total Bilirubin 0.5 mg/dL      Globulin 3.3 gm/dL      A/G Ratio 0.8 g/dL      BUN/Creatinine Ratio 13.3     Anion Gap 8.4 mmol/L      eGFR 78.8 mL/min/1.73     Narrative:      GFR Normal >60  Chronic Kidney Disease <60  Kidney Failure <15      Lipase [[id number]]  (Normal) Collected: 09/03/23 1601    Specimen: Blood Updated: 09/03/23 1640     Lipase 24 U/L     D-dimer, Quantitative [[id number]]  (Abnormal) Collected: 09/03/23 1604    Specimen:  "Blood Updated: 09/03/23 1635     D-Dimer, Quantitative 0.95 MCGFEU/mL     Narrative:      According to the assay 's published package insert, a normal (<0.50 MCGFEU/mL) D-dimer result in conjunction with a non-high clinical probability assessment, excludes deep vein thrombosis (DVT) and pulmonary embolism (PE) with high sensitivity.    D-dimer values increase with age and this can make VTE exclusion of an older population difficult. To address this, the [employer], based on best available evidence and recent guidelines, recommends that clinicians use age-adjusted D-dimer thresholds in patients greater than 50 years of age with: a) a low probability of PE who do not meet all Pulmonary Embolism Rule Out Criteria, or b) in those with intermediate probability of PE.   The formula for an age-adjusted D-dimer cut-off is \"age/100\".  For example, a 60 year old patient would have an age-adjusted cut-off of 0.60 MCGFEU/mL and an 80 year old 0.80 MCGFEU/mL.    Urinalysis without microscopic (no culture) - Urine, Clean Catch [[id number]]  (Abnormal) Collected: 09/03/23 1553    Specimen: Urine, Clean Catch Updated: 09/03/23 1633     Color, UA Dark Yellow     Appearance, UA Slightly Cloudy     pH, UA 7.0     Specific Gravity, UA 1.015     Glucose, UA Negative     Ketones, UA Trace     Bilirubin, UA Small (1+)     Blood, UA Negative     Protein,  mg/dL (2+)     Leuk Esterase, UA Negative     Nitrite, UA Positive     Urobilinogen, UA 4.0 E.U./dL    COVID-19 and FLU A/B PCR - Swab, Nasopharynx [[id number]]  (Normal) Collected: 09/03/23 1555    Specimen: Swab from Nasopharynx Updated: 09/03/23 1627     COVID19 Not Detected     Influenza A PCR Not Detected     Influenza B PCR Not Detected    Narrative:      Fact sheet for providers: https://www.fda.gov/media/654243/download    Fact sheet for patients: https://www.fda.gov/media/310689/download    Test performed by PCR.    CBC & Differential " [[id number]]  (Abnormal) Collected: 09/03/23 1601    Specimen: Blood Updated: 09/03/23 1611    Narrative:      The following orders were created for panel order CBC & Differential.  Procedure                               Abnormality         Status                     ---------                               -----------         ------                     CBC Auto Differential[[id number]]        Abnormal            Final result                 Please view results for these tests on the individual orders.    CBC Auto Differential [[id number]]  (Abnormal) Collected: 09/03/23 1601    Specimen: Blood Updated: 09/03/23 1611     WBC 11.60 10*3/mm3      RBC 4.23 10*6/mm3      Hemoglobin 12.0 g/dL      Hematocrit 37.2 %      MCV 87.9 fL      MCH 28.4 pg      MCHC 32.3 g/dL      RDW 15.4 %      RDW-SD 49.7 fl      MPV 10.1 fL      Platelets 330 10*3/mm3              Imaging Results (Last 24 Hours)       Procedure Component Value Units Date/Time    CT Abdomen Pelvis With Contrast [[id number]] Collected: 09/03/23 1734     Updated: 09/03/23 1744    Narrative:      CT ABDOMEN PELVIS W CONTRAST    Date of Exam: 9/3/2023 4:45 PM EDT    Indication: Diarrhea  diarrhea, abdominal pain for x2 months.    Comparison: None available.    Technique: Axial CT images were obtained of the abdomen and pelvis following the uneventful intravenous administration of iodinated contrast. Sagittal and coronal reconstructions were performed.  Automated exposure control and iterative reconstruction   methods were used.        Findings:  Within the left lower lobe there is a spiculated 12 mm noncalcified pulmonary nodule. PET/CT scan would be recommended for further evaluation. There are multiple bilateral lower lobe pulmonary emboli, fully described on CT scan of the chest performed   same day.    Within the left lobe of liver near the gallbladder fossa there is a 2.4 cm low-density mass likely a cyst. No contrast enhancing liver lesion identified.  Gallbladder is normal.    Pancreas is within normal limits. Patient is status post splenectomy. Within the right adrenal gland there is a 1.3 cm nodule which is nonspecific. This can also be evaluated on PET/CT is within the left adrenal gland there is an 11 mm nodule which can   also be evaluated on future PET CT scan. There are low-density masses within the right kidney compatible with cyst. There is no evidence of hydronephrosis. No abdominal or adenopathy.    The upper GI tract is within normal limits.    Pelvis: Urinary bladder is within normal limits. The uterus is lobular. There is a partially calcified mass within the fundus of the uterus consistent with uterine fibroid. The ovaries appear within normal limits bilaterally.    There is segmental bowel wall thickening involving the colon level of the splenic flecture. Findings are suggestive of colitis which may be infectious or inflammatory in etiology. There appears to be a segmental region of circumferential bowel wall   thickening distal transverse colon. Colon proximal to this point is moderately distended. Colonoscopy would be recommended for further evaluation when clinically feasible. The segmental thickening is best visualized on axial image #116 and coronal image   #43. No pelvic or inguinal adenopathy.    There are degenerative changes of the spine. There are no lytic or sclerotic bony lesions.      Impression:      Impression:    1. Multiple lower lobe pulmonary emboli. 212 mm noncalcified pulmonary nodule within the left lower lobe. PET/CT scan would be recommended for further evaluation.  3. Bilateral adrenal nodules which are statistically adenomas however given the presence of the pulmonary nodule, these can be evaluated on future PET/CT.  4. Segmental circumferential bowel wall thickening involving the distal descending colon. Colon proximal to this is mildly distended. This could represent a short segment stricture. Consider colonoscopy for  further evaluation. Additionally there is bowel   wall thickening involving the splenic flexure of the colon consistent with changes of colitis which may be infectious or inflammatory in etiology.            Electronically Signed: Blayne Orozco MD    9/3/2023 5:42 PM EDT    Workstation ID: [id number]    CT Angiogram Chest Pulmonary Embolism [[id number]] Collected: 09/03/23 1727     Updated: 09/03/23 1736    Narrative:      CT ANGIOGRAM CHEST PULMONARY EMBOLISM    Date of Exam: 9/3/2023 4:45 PM EDT    Indication: Pulmonary embolism (PE) suspected, positive D-dimer.    Comparison: None      Technique: Axial CT images were obtained of the chest after the uneventful intravenous administration of iodinated contrast utilizing pulmonary embolism protocol.  Sagittal and coronal reconstructions were performed.  Automated exposure control and   iterative reconstruction methods were used.      Findings:    There are multiple small bilateral pulmonary emboli. No CT evidence of right heart strain.    No coronary artery calcification. Thoracic aorta is of normal caliber. No evidence of aortic dissection.    No mediastinal, hilar, or axillary adenopathy. No pleural effusions. Within the left lower lobe there is a noncalcified 12 mm pulmonary nodule. There is minimal right upper lobe atelectasis.    Visualized portions of the adrenal glands are within normal limits.    There are no lytic or sclerotic bony lesions identified.         Impression:      Impression:    1. Multiple bilateral pulmonary emboli. No evidence of right heart strain.  2. Noncalcified 12 mm nodule within the left lower lobe. PET/CT scan would be recommended for further evaluation.        Electronically Signed: Blayne Orozco MD    9/3/2023 5:34 PM EDT    Workstation ID: [id number]              Assessment & Plan        This is a 63 y.o. female with:    Active and Resolved Problems  Active Hospital Problems    Diagnosis  POA    **Pulmonary emboli [I26.99]  Yes     History of CVA in adulthood [Z86.73]  Not Applicable    Hodgkin's disease in remission [C81.90]  Yes    HTN (hypertension) [I10]  Yes    Dysphagia [R13.10]  Yes    GERD (gastroesophageal reflux disease) [K21.9]  Yes    HLD (hyperlipidemia) [E78.5]  Yes    Anxiety associated with depression [F41.8]  Yes    Pulmonary embolus [I26.99]  Yes    Colitis [K52.9]  Yes    Acute UTI (urinary tract infection) [N39.0]  Yes      Resolved Hospital Problems   No resolved problems to display.     #Pe's  At this time, unsure why the patient started having multiple Pe's.  Patient states she recently had covid, but there is also this new dysphagia in the setting of chronic GERD, which could be a new malignancy.  No duplex of BLE's done yet.  Currently on heparin GGT.  - Would like to transition patient to NOAC but she cannot take any oral medications due to dysphagia  - Venous Duplex BLEs    #Dysphagia  Given progressive worsening of PO intake in the setting of GERD now unable to even take pills, I am highly concerned for a esophageal or gastric malignancy.  I feel that she will need an EGD.  - Soft diet  - PPI IV daily  - GI consulted, appreciate recs    #UTI/Colitis  Apparently incidental findings on CT scan and [facility]A at [facility].  S/p CTX x1.  - Moving to zosyn to cover both etiologies    #HTN/HLD/CVAhx  HDS  - Holding home meds as patient cannot swallow pills  - PRN labetalol IV    #Anxiety/Depression  Stable.  - Holding home meds as patient cannot swallow pills      DVT prophylaxis:  Medical DVT prophylaxis orders are present.    The patient desires to be as follows:    CODE STATUS:    Level Of Support Discussed With: Patient  Code Status (Patient has no pulse and is not breathing): CPR (Attempt to Resuscitate)  Medical Interventions (Patient has pulse or is breathing): Full Support      She designates Lesa Serrato who can be contacted at [phone] to make medical decisions on her behalf if She is incapable of doing so.  Patient declared said statement while fully alert and oriented on 9/3/2023 during the course of this H&P.    Admission Status:  I believe this patient meets inpatient status.    Expected Length of Stay: 3-4 days    PDMP reviewed and consistent with patient reported medications.    I discussed the patient's findings and my recommendations with patient.      Signature:       Declan Cotto M.D.    This document has been electronically signed by Declan Cotto MD on September 3, 2023 21:58 EDT   [facility] Hospitalist Team

## 2023-09-04 NOTE — PLAN OF CARE
Problem: Adult Inpatient Plan of Care  Goal: Plan of Care Review  Outcome: Ongoing, Progressing  Flowsheets (Taken 9/4/2023 1651)  Progress: improving  Plan of Care Reviewed With: patient  Goal: Patient-Specific Goal (Individualized)  Outcome: Ongoing, Progressing  Goal: Absence of Hospital-Acquired Illness or Injury  Outcome: Ongoing, Progressing  Intervention: Identify and Manage Fall Risk  Recent Flowsheet Documentation  Taken 9/4/2023 1451 by Bethany Nolasco RN  Safety Promotion/Fall Prevention:   assistive device/personal items within reach   clutter free environment maintained   nonskid shoes/slippers when out of bed   room organization consistent   safety round/check completed  Taken 9/4/2023 1241 by Bethany Nolasco RN  Safety Promotion/Fall Prevention:   assistive device/personal items within reach   clutter free environment maintained   nonskid shoes/slippers when out of bed   room organization consistent   safety round/check completed  Taken 9/4/2023 1055 by Bethany Nolasco RN  Safety Promotion/Fall Prevention:   assistive device/personal items within reach   clutter free environment maintained   nonskid shoes/slippers when out of bed   room organization consistent   safety round/check completed  Taken 9/4/2023 0845 by Bethany Nolasco RN  Safety Promotion/Fall Prevention:   assistive device/personal items within reach   clutter free environment maintained   nonskid shoes/slippers when out of bed   room organization consistent   safety round/check completed  Intervention: Prevent Skin Injury  Recent Flowsheet Documentation  Taken 9/4/2023 0845 by Bethany Nolasco RN  Body Position:   position changed independently   supine  Intervention: Prevent and Manage VTE (Venous Thromboembolism) Risk  Recent Flowsheet Documentation  Taken 9/4/2023 0845 by Bethany Nolasco RN  Activity Management:   up ad caroline   activity encouraged  VTE Prevention/Management: sequential compression devices off  Range of Motion: active  ROM (range of motion) encouraged  Intervention: Prevent Infection  Recent Flowsheet Documentation  Taken 9/4/2023 1451 by Bethany Nolasco RN  Infection Prevention:   environmental surveillance performed   hand hygiene promoted   single patient room provided  Taken 9/4/2023 1241 by Bethany Nolasco RN  Infection Prevention:   environmental surveillance performed   hand hygiene promoted   single patient room provided   visitors restricted/screened  Taken 9/4/2023 1055 by Bethany Nolasco RN  Infection Prevention:   environmental surveillance performed   hand hygiene promoted   single patient room provided   visitors restricted/screened  Taken 9/4/2023 0845 by Bethany Nolasco RN  Infection Prevention:   environmental surveillance performed   hand hygiene promoted   single patient room provided  Goal: Optimal Comfort and Wellbeing  Outcome: Ongoing, Progressing  Intervention: Provide Person-Centered Care  Recent Flowsheet Documentation  Taken 9/4/2023 0845 by Bethany Nolasco RN  Trust Relationship/Rapport:   care explained   choices provided  Goal: Readiness for Transition of Care  Outcome: Ongoing, Progressing     Problem: VTE (Venous Thromboembolism)  Goal: VTE (Venous Thromboembolism) Symptom Resolution  Outcome: Ongoing, Progressing  Intervention: Prevent or Manage VTE (Venous Thromboembolism)  Recent Flowsheet Documentation  Taken 9/4/2023 0845 by Bethany Nolasco RN  Bleeding Precautions:   monitored for signs of bleeding   gentle oral care promoted  VTE Prevention/Management: sequential compression devices off   Goal Outcome Evaluation:  Plan of Care Reviewed With: patient        Progress: improving     Alert and oriented x 4. Able to verbalize needs and wants. Takes medication whole with water, encouragement needed. Does not require O2 therapy at this time. Independent for transfers/ambulation. Continent of bowel and bladder. Continues on heparin drip, heparin drip held x 1 hr per protocol d/t elevated APTT  results. LR running @ 50 ml/hr and tolerating well. Continues to be followed by Gastroenterology. Continues to receive IV Zosyn, no s/s of adverse/allergic reaction noted. Currently in bed, awake. Call bell in reach. Discharge plan: home.

## 2023-09-04 NOTE — PLAN OF CARE
Goal Outcome Evaluation:  Plan of Care Reviewed With: patient        Progress: no change          Pt currently resting abed. No complaints or distress noted. VSS continuing to monitor.

## 2023-09-04 NOTE — CONSULTS
GI CONSULT  NOTE:    Referring Provider:  Dr. Garvey    Chief complaint: SOA, dysphagia.     Subjective .     History of present illness: Chelsea Lees is a 63 y.o. female with history of CVA on Plavix, Hodgkin's in remission, anxiety, depression, hyperlipidemia, hypertension presented to the hospital with complaints of shortness of air and dysphagia.  She has been short of air for about 1 monthand was found to have multiple pulmonary embolisms on CT.  She was also noted to have bowel wall thickening in the distal descending colon as well as at the splenic flexure.  During interview, patient is actively eating breakfast and does not appear to have difficulty swallowing.  Patient denies the feeling of food getting stuck when swallowing.  She does have dysphagia to large pills but is able to swallow these individually with applesauce. Her main complaint is that she will eat a couple bites of something and immediately feel full.  This can occur with any food.  She has lost about 20 pounds due to this.  She had nausea/vomiting 1 month ago when she had COVID-19, but none since.  She does get heartburn/reflux and started a PPI in the spring with good improvement.  She denies abdominal pain.  Her last bowel movement was this morning and she typically has regular bowel movements.  She has been told she has chronic constipation and can sometimes skip 2 to 3 days between a bowel movement which is not out of her norm.  If this occurs, she will take Colace. Currently having formed stools. No diarrhea. S She denies bright red blood per rectum or melena.  She does state her stool and urine are malodorous.  She does confirm UTI symptoms. She was febrile last night.  In regards to her pulmonary embolisms, she denies a history of blood clots.  She has remained on Plavix.  No family history of clotting disorders that she is aware of.    Endo History:  5/2021 colonoscopy ([facility])-polyps (SSA with low-grade dysplasia)  No history of  EGD    Past Medical History:  History reviewed. No pertinent past medical history.    Past Surgical History:  History reviewed. No pertinent surgical history.    Social History:  Social History     Tobacco Use    Smoking status: Never    Smokeless tobacco: Never   Vaping Use    Vaping Use: Never used   Substance Use Topics    Alcohol use: Never    Drug use: Never       Family History:  History reviewed. No pertinent family history.    Medications:  Medications Prior to Admission   Medication Sig Dispense Refill Last Dose    aspirin 81 MG EC tablet Take 1 tablet by mouth Daily.   Past Month    atorvastatin (LIPITOR) 40 MG tablet Take 1 tablet by mouth Every Night.   Past Month    cetirizine (zyrTEC) 10 MG tablet Take 1 tablet by mouth Daily.   Past Month    clopidogrel (PLAVIX) 75 MG tablet Take 1 tablet by mouth Daily.   Past Month    lisinopril-hydrochlorothiazide (PRINZIDE,ZESTORETIC) 20-12.5 MG per tablet Take 1 tablet by mouth Daily.   Past Month    sertraline (ZOLOFT) 100 MG tablet Take 1 tablet by mouth Daily.   Past Month       Scheduled Meds:furosemide, 40 mg, Intravenous, Daily  pantoprazole, 40 mg, Intravenous, Q AM  piperacillin-tazobactam, 4.5 g, Intravenous, Q8H  senna-docusate sodium, 2 tablet, Oral, BID  sodium chloride, 10 mL, Intravenous, Q12H      Continuous Infusions:heparin, 17 Units/kg/hr, Last Rate: 17 Units/kg/hr (09/04/23 0715)  lactated ringers, 50 mL/hr, Last Rate: 50 mL/hr (09/04/23 0056)      PRN Meds:.  acetaminophen    senna-docusate sodium **AND** polyethylene glycol **AND** bisacodyl **AND** bisacodyl    Calcium Replacement - Follow Nurse / BPA Driven Protocol    labetalol    Magnesium Standard Dose Replacement - Follow Nurse / BPA Driven Protocol    ondansetron **OR** ondansetron    oxyCODONE    Phosphorus Replacement - Follow Nurse / BPA Driven Protocol    Potassium Replacement - Follow Nurse / BPA Driven Protocol    [COMPLETED] Insert peripheral IV **AND** sodium chloride    sodium  chloride    sodium chloride    temazepam    ALLERGIES:  Patient has no known allergies.    ROS:  The following systems were reviewed and negative;   Constitution:  No fevers, chills, +unintentional weight loss  Skin: no rash, no jaundice  Eyes:  No blurry vision, no eye pain  HENT:  No change in hearing or smell  Resp:  No dyspnea or cough  CV:  No chest pain or palpitations  :  +dysuria, hematuria  Musculoskeletal:  No leg cramps or arthralgias  Neuro:  No tremor, no numbness  Psych:  No depression or confusion    Objective     Vital Signs:   Vitals:    09/03/23 1935 09/04/23 0041 09/04/23 0320 09/04/23 0807   BP: 122/77 106/68 101/56 103/74   BP Location:  Left arm Left arm Left arm   Patient Position:  Lying Lying Lying   Pulse: 97 93 86 83   Resp: 19 18 20 16   Temp: 98 °F (36.7 °C) 100.1 °F (37.8 °C) 98 °F (36.7 °C) 97.3 °F (36.3 °C)   TempSrc:  Oral Oral Oral   SpO2: 99% 94% 91% 93%   Weight:       Height:           Physical Exam:       General Appearance:    Awake and alert, in no acute distress   Head:    Normocephalic, without obvious abnormality, atraumatic   Throat:   No oral lesions, no thrush, oral mucosa moist   Lungs:     Respirations regular, even and unlabored   Chest Wall:    No abnormalities observed   Abdomen:     Soft, non-tender, no rebound or guarding, non-distended, no hepatosplenomegaly   Rectal:     Deferred   Extremities:   Moves all extremities, no edema, no cyanosis   Pulses:   Pulses palpable and equal bilaterally   Skin:   No rash, no jaundice, normal palpation       Neurologic:   Cranial nerves 2 - 12 grossly intact, no asterixis       Results Review:   I reviewed the patient's labs and imaging.  CBC    Results from last 7 days   Lab Units 09/04/23  0042 09/03/23  1601   WBC 10*3/mm3 10.30 11.60*   HEMOGLOBIN g/dL 11.3* 12.0   PLATELETS 10*3/mm3 284 330     CMP   Results from last 7 days   Lab Units 09/04/23  0042 09/03/23  1601   SODIUM mmol/L 136 133*   POTASSIUM mmol/L 3.8 2.9*    CHLORIDE mmol/L 105 100   CO2 mmol/L 22.0 24.6   BUN mg/dL 10 11   CREATININE mg/dL 0.64 0.83   GLUCOSE mg/dL 118* 112*   ALBUMIN g/dL 2.4* 2.7*   BILIRUBIN mg/dL 0.4 0.5   ALK PHOS U/L 99 112   AST (SGOT) U/L 20 20   ALT (SGPT) U/L 16 16   MAGNESIUM mg/dL  --  1.8   LIPASE U/L  --  24     Cr Clearance Estimated Creatinine Clearance: 98.7 mL/min (by C-G formula based on SCr of 0.64 mg/dL).  Coag   Results from last 7 days   Lab Units 09/04/23  0042 09/03/23  1831 09/03/23  1801   INR   --  1  --    APTT seconds 68.0  --  30.5*     HbA1C No results found for: HGBA1C  Blood Glucose No results found for: POCGLU  Infection     UA    Results from last 7 days   Lab Units 09/03/23  1553   NITRITE UA  Positive*     Radiology(recent) CT Abdomen Pelvis With Contrast    Result Date: 9/3/2023  Impression: 1. Multiple lower lobe pulmonary emboli. 212 mm noncalcified pulmonary nodule within the left lower lobe. PET/CT scan would be recommended for further evaluation. 3. Bilateral adrenal nodules which are statistically adenomas however given the presence of the pulmonary nodule, these can be evaluated on future PET/CT. 4. Segmental circumferential bowel wall thickening involving the distal descending colon. Colon proximal to this is mildly distended. This could represent a short segment stricture. Consider colonoscopy for further evaluation. Additionally there is bowel  wall thickening involving the splenic flexure of the colon consistent with changes of colitis which may be infectious or inflammatory in etiology. Electronically Signed: Blayne Orozco MD  9/3/2023 5:42 PM EDT  Workstation ID: [id number]    CT Angiogram Chest Pulmonary Embolism    Result Date: 9/3/2023  Impression: 1. Multiple bilateral pulmonary emboli. No evidence of right heart strain. 2. Noncalcified 12 mm nodule within the left lower lobe. PET/CT scan would be recommended for further evaluation. Electronically Signed: Blayne Orozco MD  9/3/2023 5:34 PM EDT   Workstation ID: [id number]        ASSESSMENT AND PLAN:    63-year-old female admitted to the hospital with shortness of air.  Found to have multiple pulmonary embolisms as well as incidental findings consistent with colitis and possible colonic stricture on CT.  She has a history of a CVA on Plavix.  GI has been consulted for dysphagia. Pt describing more early satiety rather than dysphagia.     -Early satiety  -Unintentional weight loss - 20lbs  -Pulmonary emboli  -Abnormal CT with circumferential thickening in the distal descending colon with proximal colon mildly distended and wall thickening at the splenic flexure  -Acute UTI  -Dyspepsia -improved with PPI  -Recent COVID-19 infection (~1 month ago)  -Personal history of colon polyps   -History of CVA on Plavix  -History of Hodgkin's in remission  -Anxiety/depression    Plan  Patient is not complaining of dysphagia, but does report early satiety after eating only small amounts of food which has caused unintentional weight loss of about 20lbs. Denies any feeling of food sticking. Her reflux symptoms have improved with PPI.  Would recommend EGD for further evaluation, however, she is high risk for sedation due to multiple pulmonary emboli.  She has been started on a heparin drip.  Bilateral lower extremity ultrasound is pending.  We could consider evaluation noninvasively with esophagram first. Will discuss further with Dr. Lance. During interview, she is eating breakfast without difficulty. Could add on EGD to already scheduled colonoscopy in November pending pt progress.   In regards to her abnormal CT of the colon, she is without abdominal pain or diarrhea.  She is having daily bowel movements but can be constipated easily. There was concern for a colonic stricture at distal descending colon. Will check inflammatory markers. She is scheduled for surveillance colonoscopy in November. She has been started on Zosyn for her UTI as well as coverage for possible  colitis. Continue to monitor.   Continue supportive care.     I discussed the patients findings and my recommendations with the patient.    We appreciate the referral    Electronically signed by BREE Mccloud, 09/04/23, 9:17 AM EDT.

## 2023-09-05 ENCOUNTER — APPOINTMENT (OUTPATIENT)
Dept: GENERAL RADIOLOGY | Facility: HOSPITAL | Age: 63
DRG: 176 | End: 2023-09-05
Payer: COMMERCIAL

## 2023-09-05 VITALS
OXYGEN SATURATION: 95 % | SYSTOLIC BLOOD PRESSURE: 108 MMHG | HEART RATE: 88 BPM | TEMPERATURE: 98.1 F | DIASTOLIC BLOOD PRESSURE: 60 MMHG | RESPIRATION RATE: 20 BRPM | WEIGHT: 184 LBS | BODY MASS INDEX: 28.88 KG/M2 | HEIGHT: 67 IN

## 2023-09-05 LAB
ALBUMIN SERPL-MCNC: 2 G/DL (ref 3.5–5.2)
ALBUMIN/GLOB SERPL: 0.7 G/DL
ALP SERPL-CCNC: 88 U/L (ref 39–117)
ALT SERPL W P-5'-P-CCNC: 11 U/L (ref 1–33)
ANION GAP SERPL CALCULATED.3IONS-SCNC: 13 MMOL/L (ref 5–15)
APTT PPP: 37.3 SECONDS (ref 61–76.5)
AST SERPL-CCNC: 20 U/L (ref 1–32)
BASOPHILS # BLD MANUAL: 0.1 10*3/MM3 (ref 0–0.2)
BASOPHILS NFR BLD MANUAL: 1 % (ref 0–1.5)
BH CV ECHO MEAS - ACS: 1.5 CM
BH CV ECHO MEAS - AO MAX PG: 12.3 MMHG
BH CV ECHO MEAS - AO MEAN PG: 6 MMHG
BH CV ECHO MEAS - AO ROOT DIAM: 3.1 CM
BH CV ECHO MEAS - AO V2 MAX: 175 CM/SEC
BH CV ECHO MEAS - AO V2 VTI: 30.5 CM
BH CV ECHO MEAS - AVA(I,D): 1.52 CM2
BH CV ECHO MEAS - EDV(CUBED): 64 ML
BH CV ECHO MEAS - EDV(MOD-SP4): 54.4 ML
BH CV ECHO MEAS - EF(MOD-SP4): 52.9 %
BH CV ECHO MEAS - ESV(CUBED): 15.6 ML
BH CV ECHO MEAS - ESV(MOD-SP4): 25.6 ML
BH CV ECHO MEAS - FS: 37.5 %
BH CV ECHO MEAS - IVS/LVPW: 1 CM
BH CV ECHO MEAS - IVSD: 1.1 CM
BH CV ECHO MEAS - LA DIMENSION: 3.8 CM
BH CV ECHO MEAS - LAT PEAK E' VEL: 15.8 CM/SEC
BH CV ECHO MEAS - LV MASS(C)D: 145.6 GRAMS
BH CV ECHO MEAS - LV MAX PG: 3.1 MMHG
BH CV ECHO MEAS - LV MEAN PG: 2 MMHG
BH CV ECHO MEAS - LV V1 MAX: 88 CM/SEC
BH CV ECHO MEAS - LV V1 VTI: 16.3 CM
BH CV ECHO MEAS - LVIDD: 4 CM
BH CV ECHO MEAS - LVIDS: 2.5 CM
BH CV ECHO MEAS - LVOT AREA: 2.8 CM2
BH CV ECHO MEAS - LVOT DIAM: 1.9 CM
BH CV ECHO MEAS - LVPWD: 1.1 CM
BH CV ECHO MEAS - MED PEAK E' VEL: 10.8 CM/SEC
BH CV ECHO MEAS - MR MAX PG: 84.3 MMHG
BH CV ECHO MEAS - MR MAX VEL: 459 CM/SEC
BH CV ECHO MEAS - MV A MAX VEL: 97.8 CM/SEC
BH CV ECHO MEAS - MV DEC SLOPE: 339 CM/SEC2
BH CV ECHO MEAS - MV DEC TIME: 0.18 MSEC
BH CV ECHO MEAS - MV E MAX VEL: 73.2 CM/SEC
BH CV ECHO MEAS - MV E/A: 0.75
BH CV ECHO MEAS - MV MAX PG: 4.7 MMHG
BH CV ECHO MEAS - MV MEAN PG: 2 MMHG
BH CV ECHO MEAS - MV P1/2T: 73.7 MSEC
BH CV ECHO MEAS - MV V2 VTI: 25.4 CM
BH CV ECHO MEAS - MVA(P1/2T): 3 CM2
BH CV ECHO MEAS - MVA(VTI): 1.82 CM2
BH CV ECHO MEAS - PA V2 MAX: 97.7 CM/SEC
BH CV ECHO MEAS - PI END-D VEL: 148 CM/SEC
BH CV ECHO MEAS - PULM A REVS DUR: 0.11 SEC
BH CV ECHO MEAS - PULM A REVS VEL: 52 CM/SEC
BH CV ECHO MEAS - PULM DIAS VEL: 33.5 CM/SEC
BH CV ECHO MEAS - PULM S/D: 1.99
BH CV ECHO MEAS - PULM SYS VEL: 66.5 CM/SEC
BH CV ECHO MEAS - RAP SYSTOLE: 3 MMHG
BH CV ECHO MEAS - RV MAX PG: 2.32 MMHG
BH CV ECHO MEAS - RV V1 MAX: 76.1 CM/SEC
BH CV ECHO MEAS - RV V1 VTI: 11.6 CM
BH CV ECHO MEAS - RVSP: 30.9 MMHG
BH CV ECHO MEAS - SV(LVOT): 46.2 ML
BH CV ECHO MEAS - SV(MOD-SP4): 28.8 ML
BH CV ECHO MEAS - TAPSE (>1.6): 1.68 CM
BH CV ECHO MEAS - TR MAX PG: 27.9 MMHG
BH CV ECHO MEAS - TR MAX VEL: 264 CM/SEC
BH CV ECHO MEASUREMENTS AVERAGE E/E' RATIO: 5.5
BH CV XLRA - TDI S': 14.5 CM/SEC
BILIRUB SERPL-MCNC: 0.3 MG/DL (ref 0–1.2)
BLASTS NFR BLD MANUAL: 6 % (ref 0–0)
BUN SERPL-MCNC: 10 MG/DL (ref 8–23)
BUN/CREAT SERPL: 15.9 (ref 7–25)
BURR CELLS BLD QL SMEAR: ABNORMAL
CALCIUM SPEC-SCNC: 7.6 MG/DL (ref 8.6–10.5)
CHLORIDE SERPL-SCNC: 105 MMOL/L (ref 98–107)
CO2 SERPL-SCNC: 22 MMOL/L (ref 22–29)
CREAT SERPL-MCNC: 0.63 MG/DL (ref 0.57–1)
DEPRECATED RDW RBC AUTO: 48.6 FL (ref 37–54)
EGFRCR SERPLBLD CKD-EPI 2021: 99.2 ML/MIN/1.73
EOSINOPHIL # BLD MANUAL: 0.31 10*3/MM3 (ref 0–0.4)
EOSINOPHIL NFR BLD MANUAL: 3 % (ref 0.3–6.2)
ERYTHROCYTE [DISTWIDTH] IN BLOOD BY AUTOMATED COUNT: 15.1 % (ref 12.3–15.4)
GLOBULIN UR ELPH-MCNC: 2.8 GM/DL
GLUCOSE SERPL-MCNC: 90 MG/DL (ref 65–99)
HCT VFR BLD AUTO: 31.7 % (ref 34–46.6)
HGB BLD-MCNC: 10.7 G/DL (ref 12–15.9)
LAB AP CASE REPORT: NORMAL
LYMPHOCYTES # BLD MANUAL: 6.53 10*3/MM3 (ref 0.7–3.1)
LYMPHOCYTES NFR BLD MANUAL: 8 % (ref 5–12)
MCH RBC QN AUTO: 29.5 PG (ref 26.6–33)
MCHC RBC AUTO-ENTMCNC: 33.7 G/DL (ref 31.5–35.7)
MCV RBC AUTO: 87.6 FL (ref 79–97)
METAMYELOCYTES NFR BLD MANUAL: 1 % (ref 0–0)
MONOCYTES # BLD: 0.82 10*3/MM3 (ref 0.1–0.9)
MYELOCYTES NFR BLD MANUAL: 3 % (ref 0–0)
NEUTROPHILS # BLD AUTO: 1.33 10*3/MM3 (ref 1.7–7)
NEUTROPHILS NFR BLD MANUAL: 13 % (ref 42.7–76)
NRBC SPEC MANUAL: 2 /100 WBC (ref 0–0.2)
PATH REPORT.FINAL DX SPEC: NORMAL
PLAT MORPH BLD: NORMAL
PLATELET # BLD AUTO: 306 10*3/MM3 (ref 140–450)
PMV BLD AUTO: 8.9 FL (ref 6–12)
POTASSIUM SERPL-SCNC: 3.6 MMOL/L (ref 3.5–5.2)
PROLYMPHOCYTES NFR BLD MANUAL: 1 % (ref 0–0)
PROT SERPL-MCNC: 4.8 G/DL (ref 6–8.5)
RBC # BLD AUTO: 3.62 10*6/MM3 (ref 3.77–5.28)
SCAN SLIDE: NORMAL
SCHISTOCYTES BLD QL SMEAR: ABNORMAL
SINUS: 2.8 CM
SMUDGE CELLS BLD QL SMEAR: ABNORMAL
SODIUM SERPL-SCNC: 140 MMOL/L (ref 136–145)
STJ: 1.9 CM
TARGETS BLD QL SMEAR: ABNORMAL
VARIANT LYMPHS NFR BLD MANUAL: 13 % (ref 0–5)
VARIANT LYMPHS NFR BLD MANUAL: 51 % (ref 19.6–45.3)
WBC NRBC COR # BLD: 10.2 10*3/MM3 (ref 3.4–10.8)

## 2023-09-05 PROCEDURE — 85730 THROMBOPLASTIN TIME PARTIAL: CPT | Performed by: STUDENT IN AN ORGANIZED HEALTH CARE EDUCATION/TRAINING PROGRAM

## 2023-09-05 PROCEDURE — 80053 COMPREHEN METABOLIC PANEL: CPT

## 2023-09-05 PROCEDURE — 74220 X-RAY XM ESOPHAGUS 1CNTRST: CPT

## 2023-09-05 PROCEDURE — 85007 BL SMEAR W/DIFF WBC COUNT: CPT | Performed by: STUDENT IN AN ORGANIZED HEALTH CARE EDUCATION/TRAINING PROGRAM

## 2023-09-05 PROCEDURE — 25010000002 FUROSEMIDE PER 20 MG: Performed by: STUDENT IN AN ORGANIZED HEALTH CARE EDUCATION/TRAINING PROGRAM

## 2023-09-05 PROCEDURE — 25010000002 PIPERACILLIN SOD-TAZOBACTAM PER 1 G: Performed by: STUDENT IN AN ORGANIZED HEALTH CARE EDUCATION/TRAINING PROGRAM

## 2023-09-05 PROCEDURE — 85025 COMPLETE CBC W/AUTO DIFF WBC: CPT | Performed by: STUDENT IN AN ORGANIZED HEALTH CARE EDUCATION/TRAINING PROGRAM

## 2023-09-05 RX ORDER — PANTOPRAZOLE SODIUM 40 MG/1
40 TABLET, DELAYED RELEASE ORAL
Qty: 30 TABLET | Refills: 0 | Status: SHIPPED | OUTPATIENT
Start: 2023-09-06

## 2023-09-05 RX ORDER — PANTOPRAZOLE SODIUM 40 MG/1
40 TABLET, DELAYED RELEASE ORAL
Status: DISCONTINUED | OUTPATIENT
Start: 2023-09-06 | End: 2023-09-05 | Stop reason: HOSPADM

## 2023-09-05 RX ORDER — METOCLOPRAMIDE 5 MG/1
5 TABLET ORAL
Qty: 20 TABLET | Refills: 0 | Status: SHIPPED | OUTPATIENT
Start: 2023-09-05

## 2023-09-05 RX ORDER — POTASSIUM CHLORIDE 20 MEQ/1
40 TABLET, EXTENDED RELEASE ORAL EVERY 4 HOURS
Status: COMPLETED | OUTPATIENT
Start: 2023-09-05 | End: 2023-09-05

## 2023-09-05 RX ADMIN — APIXABAN 10 MG: 5 TABLET, FILM COATED ORAL at 09:41

## 2023-09-05 RX ADMIN — METOCLOPRAMIDE 5 MG: 5 TABLET ORAL at 07:38

## 2023-09-05 RX ADMIN — BARIUM SULFATE 135 ML: 980 POWDER, FOR SUSPENSION ORAL at 10:26

## 2023-09-05 RX ADMIN — METOCLOPRAMIDE 5 MG: 5 TABLET ORAL at 11:43

## 2023-09-05 RX ADMIN — PIPERACILLIN AND TAZOBACTAM 4.5 G: 4; .5 INJECTION, POWDER, FOR SOLUTION INTRAVENOUS at 00:35

## 2023-09-05 RX ADMIN — Medication 10 ML: at 07:39

## 2023-09-05 RX ADMIN — PIPERACILLIN AND TAZOBACTAM 4.5 G: 4; .5 INJECTION, POWDER, FOR SOLUTION INTRAVENOUS at 07:38

## 2023-09-05 RX ADMIN — SERTRALINE 100 MG: 100 TABLET, FILM COATED ORAL at 09:50

## 2023-09-05 RX ADMIN — CLOPIDOGREL BISULFATE 75 MG: 75 TABLET ORAL at 09:42

## 2023-09-05 RX ADMIN — FUROSEMIDE 40 MG: 10 INJECTION, SOLUTION INTRAMUSCULAR; INTRAVENOUS at 09:41

## 2023-09-05 RX ADMIN — PANTOPRAZOLE SODIUM 40 MG: 40 INJECTION, POWDER, LYOPHILIZED, FOR SOLUTION INTRAVENOUS at 07:38

## 2023-09-05 RX ADMIN — POTASSIUM CHLORIDE 40 MEQ: 1500 TABLET, EXTENDED RELEASE ORAL at 04:46

## 2023-09-05 RX ADMIN — Medication 10 ML: at 09:43

## 2023-09-05 RX ADMIN — CETIRIZINE HYDROCHLORIDE 10 MG: 10 TABLET, FILM COATED ORAL at 09:42

## 2023-09-05 RX ADMIN — POTASSIUM CHLORIDE 40 MEQ: 1500 TABLET, EXTENDED RELEASE ORAL at 09:50

## 2023-09-05 NOTE — PAYOR COMM NOTE
"Chelsea Liu (63 y.o. Female)       Date of Birth   1960    Social Security Number       Address   [address] [place] [address] [place] [facility]    Home Phone   [phone]    MRN   [id number]       [facility]    Marital Status                               Admission Date   9/3/23    Admission Type   Urgent    Admitting Provider   Declan Cotto MD    Attending Provider   Megan Julian MD    Department, Room/Bed   [facility] 3A MEDICAL INPATIENT, 345/1       Discharge Date       Discharge Disposition       Discharge Destination                                 Attending Provider: Megan Julian MD    Allergies: No Known Allergies    Isolation: None   Infection: None   Code Status: CPR    Ht: 170.2 cm (67\")   Wt: 83.5 kg (184 lb)    Admission Cmt: None   Principal Problem: Pulmonary emboli [I26.99]                   Active Insurance as of 9/3/2023       Primary Coverage       Payor Plan Insurance Group Employer/Plan Group    [employer] BLUE CROSS AN[employer] BLUE CROSS BLUE [employer]IELD PPO [id number]       Payor Plan Address Payor Plan Phone Number Payor Plan Fax Number Effective Dates    [address] [phone]  2021 - None Entered    [facility] [id number]         Subscriber Name Subscriber Birth Date Member ID       CHELSEA LIU 1960 [id number]                     Emergency Contacts        (Rel.) Home Phone Work Phone Mobile Phone    DAVIDE GOULD (Sister) -- -- [phone]                 History & Physical        Declan Cotto MD at 23 2109            [facility] Medicine Services  History & Physical    Patient Name: Chelsea Liu  : 1960  MRN: [id number]  Primary Care Physician:  Provider, No Known  Date of admission: 9/3/2023  Date and Time of Service: 9/3/2023 at 2130    Subjective      Chief Complaint: Shortness of Breath and Dysphagia    History of Present Illness: Chelsea Liu is a 63 y.o. female with a CMH of hx of CVA, hx " of hodgkins in remission, hld, anxiety/depression, HTN who presented to [facility] on 9/3/2023 with shortness of breath and dysphagia.    Patient states that since July 30th she has been short of breath.  She went to her primary providers and was thought to have a pulmonary process like asthma.  Symptoms grew worse over the months and finally last night, the patient felt like she needed to come to the ED for evaluation.  At [facility], they performed a CTA chest, and found the patient had multiple Pe's.    Patient also states for the last few months she has had progressively worsening dysphagia.  She hasn't been able to take any of her medications since Thursday and she states she has lost over 15 lbs in the last month.  Endorses emesis, nausea, and spitting food back up.  States she started taking OTC prilosec which improved the symptoms.      Review of Systems   Constitutional:  Positive for appetite change and fatigue. Negative for chills and fever.   HENT:  Negative for congestion and rhinorrhea.    Eyes:  Negative for visual disturbance.   Respiratory:  Positive for shortness of breath.    Cardiovascular:  Negative for chest pain.   Gastrointestinal:  Positive for nausea and vomiting. Negative for abdominal pain and diarrhea.   Endocrine: Negative for polyuria.   Genitourinary:  Negative for difficulty urinating and dyspareunia.   Musculoskeletal:  Negative for back pain and myalgias.   Skin:  Negative for rash and wound.   Neurological:  Positive for weakness. Negative for numbness and headaches.   Psychiatric/Behavioral:  Negative for agitation, behavioral problems and confusion.      Personal History     History reviewed. No pertinent past medical history.    History reviewed. No pertinent surgical history.    Family History: family history is not on file. Otherwise pertinent FHx was reviewed and not pertinent to current issue.    Social History:  reports that she has never smoked. She has never used  smokeless tobacco. She reports that she does not drink alcohol and does not use drugs.    Home Medications:  Prior to Admission Medications       Prescriptions Last Dose Informant Patient Reported? Taking?    aspirin 81 MG EC tablet Past Month  Yes Yes    Take 1 tablet by mouth Daily.    atorvastatin (LIPITOR) 40 MG tablet Past Month  Yes Yes    Take 1 tablet by mouth Every Night.    cetirizine (zyrTEC) 10 MG tablet Past Month  Yes Yes    Take 1 tablet by mouth Daily.    clopidogrel (PLAVIX) 75 MG tablet Past Month  Yes Yes    Take 1 tablet by mouth Daily.    lisinopril-hydrochlorothiazide (PRINZIDE,ZESTORETIC) 20-12.5 MG per tablet Past Month  Yes Yes    Take 1 tablet by mouth Daily.    sertraline (ZOLOFT) 100 MG tablet Past Month  Yes Yes    Take 1 tablet by mouth Daily.              Allergies:  No Known Allergies    Objective      Vitals:   Temp:  [97.9 °F (36.6 °C)-98 °F (36.7 °C)] 98 °F (36.7 °C)  Heart Rate:  [] 97  Resp:  [16-20] 19  BP: (114-127)/(62-90) 122/77  Body mass index is 28.82 kg/m².  Physical Exam  Constitutional:       General: She is not in acute distress.  HENT:      Head: Normocephalic and atraumatic.   Cardiovascular:      Rate and Rhythm: Normal rate and regular rhythm.      Heart sounds: Normal heart sounds.   Pulmonary:      Effort: Pulmonary effort is normal. No respiratory distress.      Breath sounds: Normal breath sounds.   Abdominal:      Palpations: Abdomen is soft.      Tenderness: There is no abdominal tenderness.   Musculoskeletal:      Right lower leg: Edema present.      Left lower leg: Edema present.   Skin:     General: Skin is warm and dry.   Neurological:      General: No focal deficit present.      Mental Status: She is alert and oriented to person, place, and time.   Psychiatric:         Mood and Affect: Mood normal.         Behavior: Behavior normal.       Diagnostic Data:  Lab Results (last 24 hours)       Procedure Component Value Units Date/Time    Manual  Differential [[id number]]  (Abnormal) Collected: 09/03/23 1601    Specimen: Blood Updated: 09/03/23 2058     Neutrophil % 13.0 %      Lymphocyte % 83.0 %      Monocyte % 2.0 %      Atypical Lymphocyte % 2.0 %      Neutrophils Absolute 1.51 10*3/mm3      Lymphocytes Absolute 9.86 10*3/mm3      Monocytes Absolute 0.23 10*3/mm3      Anisocytosis Slight/1+     [place] Cells Slight/1+     [place] Cells Slight/1+     Platelet Morphology Normal    Path Consult Reflex [[id number]] Collected: 09/03/23 1601    Specimen: Blood Updated: 09/03/23 2058     Pathology Review Yes    aPTT [[id number]]  (Abnormal) Collected: 09/03/23 1801    Specimen: Blood Updated: 09/03/23 1955     PTT 30.5 seconds     High Sensitivity Troponin T 2Hr [[id number]]  (Abnormal) Collected: 09/03/23 1801    Specimen: Blood Updated: 09/03/23 1838     HS Troponin T 18 ng/L      Troponin T Delta -1 ng/L     Narrative:      High Sensitive Troponin T Reference Range:  <10.0 ng/L- Negative Female for AMI  <15.0 ng/L- Negative Male for AMI  >=10 - Abnormal Female indicating possible myocardial injury.  >=15 - Abnormal Male indicating possible myocardial injury.   Clinicians would have to utilize clinical acumen, EKG, Troponin, and serial changes to determine if it is an Acute Myocardial Infarction or myocardial injury due to an underlying chronic condition.         POC Protime / INR [[id number]]  (Normal) Resulted: 09/03/23 1831    Specimen: Blood Updated: 09/03/23 1831     Protime 14.3 seconds      INR 1    Magnesium [[id number]]  (Normal) Collected: 09/03/23 1601    Specimen: Blood Updated: 09/03/23 1659     Magnesium 1.8 mg/dL     High Sensitivity Troponin T [[id number]]  (Abnormal) Collected: 09/03/23 1601    Specimen: Blood Updated: 09/03/23 1645     HS Troponin T 19 ng/L     Narrative:      High Sensitive Troponin T Reference Range:  <10.0 ng/L- Negative Female for AMI  <15.0 ng/L- Negative Male for AMI  >=10 - Abnormal Female indicating possible myocardial  injury.  >=15 - Abnormal Male indicating possible myocardial injury.   Clinicians would have to utilize clinical acumen, EKG, Troponin, and serial changes to determine if it is an Acute Myocardial Infarction or myocardial injury due to an underlying chronic condition.         BNP [[id number]]  (Normal) Collected: 09/03/23 1601    Specimen: Blood Updated: 09/03/23 1645     proBNP 256.7 pg/mL     Narrative:      Among patients with dyspnea, NT-proBNP is highly sensitive for the detection of acute congestive heart failure. In addition NT-proBNP of <300 pg/ml effectively rules out acute congestive heart failure with 99% negative predictive value.      Comprehensive Metabolic Panel [[id number]]  (Abnormal) Collected: 09/03/23 1601    Specimen: Blood Updated: 09/03/23 1640     Glucose 112 mg/dL      BUN 11 mg/dL      Creatinine 0.83 mg/dL      Sodium 133 mmol/L      Potassium 2.9 mmol/L      Chloride 100 mmol/L      CO2 24.6 mmol/L      Calcium 8.1 mg/dL      Total Protein 6.0 g/dL      Albumin 2.7 g/dL      ALT (SGPT) 16 U/L      AST (SGOT) 20 U/L      Alkaline Phosphatase 112 U/L      Total Bilirubin 0.5 mg/dL      Globulin 3.3 gm/dL      A/G Ratio 0.8 g/dL      BUN/Creatinine Ratio 13.3     Anion Gap 8.4 mmol/L      eGFR 78.8 mL/min/1.73     Narrative:      GFR Normal >60  Chronic Kidney Disease <60  Kidney Failure <15      Lipase [[id number]]  (Normal) Collected: 09/03/23 1601    Specimen: Blood Updated: 09/03/23 1640     Lipase 24 U/L     D-dimer, Quantitative [[id number]]  (Abnormal) Collected: 09/03/23 1604    Specimen: Blood Updated: 09/03/23 1635     D-Dimer, Quantitative 0.95 MCGFEU/mL     Narrative:      According to the assay 's published package insert, a normal (<0.50 MCGFEU/mL) D-dimer result in conjunction with a non-high clinical probability assessment, excludes deep vein thrombosis (DVT) and pulmonary embolism (PE) with high sensitivity.    D-dimer values increase with age and this can make VTE  "exclusion of an older population difficult. To address this, the American College of Physicians, based on best available evidence and recent guidelines, recommends that clinicians use age-adjusted D-dimer thresholds in patients greater than 50 years of age with: a) a low probability of PE who do not meet all Pulmonary Embolism Rule Out Criteria, or b) in those with intermediate probability of PE.   The formula for an age-adjusted D-dimer cut-off is \"age/100\".  For example, a 60 year old patient would have an age-adjusted cut-off of 0.60 MCGFEU/mL and an 80 year old 0.80 MCGFEU/mL.    Urinalysis without microscopic (no culture) - Urine, Clean Catch [[id number]]  (Abnormal) Collected: 09/03/23 1553    Specimen: Urine, Clean Catch Updated: 09/03/23 1633     Color, UA Dark Yellow     Appearance, UA Slightly Cloudy     pH, UA 7.0     Specific Gravity, UA 1.015     Glucose, UA Negative     Ketones, UA Trace     Bilirubin, UA Small (1+)     Blood, UA Negative     Protein,  mg/dL (2+)     Leuk Esterase, UA Negative     Nitrite, UA Positive     Urobilinogen, UA 4.0 E.U./dL    COVID-19 and FLU A/B PCR - Swab, Nasopharynx [[id number]]  (Normal) Collected: 09/03/23 1555    Specimen: Swab from Nasopharynx Updated: 09/03/23 1627     COVID19 Not Detected     Influenza A PCR Not Detected     Influenza B PCR Not Detected    Narrative:      Fact sheet for providers: https://www.fda.gov/media/853990/download    Fact sheet for patients: https://www.fda.gov/media/248422/download    Test performed by PCR.    CBC & Differential [[id number]]  (Abnormal) Collected: 09/03/23 1601    Specimen: Blood Updated: 09/03/23 1611    Narrative:      The following orders were created for panel order CBC & Differential.  Procedure                               Abnormality         Status                     ---------                               -----------         ------                     CBC Auto Differential[[id number]]        Abnormal            " Final result                 Please view results for these tests on the individual orders.    CBC Auto Differential [[id number]]  (Abnormal) Collected: 09/03/23 1601    Specimen: Blood Updated: 09/03/23 1611     WBC 11.60 10*3/mm3      RBC 4.23 10*6/mm3      Hemoglobin 12.0 g/dL      Hematocrit 37.2 %      MCV 87.9 fL      MCH 28.4 pg      MCHC 32.3 g/dL      RDW 15.4 %      RDW-SD 49.7 fl      MPV 10.1 fL      Platelets 330 10*3/mm3              Imaging Results (Last 24 Hours)       Procedure Component Value Units Date/Time    CT Abdomen Pelvis With Contrast [[id number]] Collected: 09/03/23 1734     Updated: 09/03/23 1744    Narrative:      CT ABDOMEN PELVIS W CONTRAST    Date of Exam: 9/3/2023 4:45 PM EDT    Indication: Diarrhea  diarrhea, abdominal pain for x2 months.    Comparison: None available.    Technique: Axial CT images were obtained of the abdomen and pelvis following the uneventful intravenous administration of iodinated contrast. Sagittal and coronal reconstructions were performed.  Automated exposure control and iterative reconstruction   methods were used.        Findings:  Within the left lower lobe there is a spiculated 12 mm noncalcified pulmonary nodule. PET/CT scan would be recommended for further evaluation. There are multiple bilateral lower lobe pulmonary emboli, fully described on CT scan of the chest performed   same day.    Within the left lobe of liver near the gallbladder fossa there is a 2.4 cm low-density mass likely a cyst. No contrast enhancing liver lesion identified. Gallbladder is normal.    Pancreas is within normal limits. Patient is status post splenectomy. Within the right adrenal gland there is a 1.3 cm nodule which is nonspecific. This can also be evaluated on PET/CT is within the left adrenal gland there is an 11 mm nodule which can   also be evaluated on future PET CT scan. There are low-density masses within the right kidney compatible with cyst. There is no evidence of  hydronephrosis. No abdominal or adenopathy.    The upper GI tract is within normal limits.    Pelvis: Urinary bladder is within normal limits. The uterus is lobular. There is a partially calcified mass within the fundus of the uterus consistent with uterine fibroid. The ovaries appear within normal limits bilaterally.    There is segmental bowel wall thickening involving the colon level of the splenic flecture. Findings are suggestive of colitis which may be infectious or inflammatory in etiology. There appears to be a segmental region of circumferential bowel wall   thickening distal transverse colon. Colon proximal to this point is moderately distended. Colonoscopy would be recommended for further evaluation when clinically feasible. The segmental thickening is best visualized on axial image #116 and coronal image   #43. No pelvic or inguinal adenopathy.    There are degenerative changes of the spine. There are no lytic or sclerotic bony lesions.      Impression:      Impression:    1. Multiple lower lobe pulmonary emboli. 212 mm noncalcified pulmonary nodule within the left lower lobe. PET/CT scan would be recommended for further evaluation.  3. Bilateral adrenal nodules which are statistically adenomas however given the presence of the pulmonary nodule, these can be evaluated on future PET/CT.  4. Segmental circumferential bowel wall thickening involving the distal descending colon. Colon proximal to this is mildly distended. This could represent a short segment stricture. Consider colonoscopy for further evaluation. Additionally there is bowel   wall thickening involving the splenic flexure of the colon consistent with changes of colitis which may be infectious or inflammatory in etiology.            Electronically Signed: Blayne Orozco MD    9/3/2023 5:42 PM EDT    Workstation ID: [id number]    CT Angiogram Chest Pulmonary Embolism [[id number]] Collected: 09/03/23 1727     Updated: 09/03/23 1736    Narrative:       CT ANGIOGRAM CHEST PULMONARY EMBOLISM    Date of Exam: 9/3/2023 4:45 PM EDT    Indication: Pulmonary embolism (PE) suspected, positive D-dimer.    Comparison: None      Technique: Axial CT images were obtained of the chest after the uneventful intravenous administration of iodinated contrast utilizing pulmonary embolism protocol.  Sagittal and coronal reconstructions were performed.  Automated exposure control and   iterative reconstruction methods were used.      Findings:    There are multiple small bilateral pulmonary emboli. No CT evidence of right heart strain.    No coronary artery calcification. Thoracic aorta is of normal caliber. No evidence of aortic dissection.    No mediastinal, hilar, or axillary adenopathy. No pleural effusions. Within the left lower lobe there is a noncalcified 12 mm pulmonary nodule. There is minimal right upper lobe atelectasis.    Visualized portions of the adrenal glands are within normal limits.    There are no lytic or sclerotic bony lesions identified.         Impression:      Impression:    1. Multiple bilateral pulmonary emboli. No evidence of right heart strain.  2. Noncalcified 12 mm nodule within the left lower lobe. PET/CT scan would be recommended for further evaluation.        Electronically Signed: Blayne Orozco MD    9/3/2023 5:34 PM EDT    Workstation ID: [id number]              Assessment & Plan        This is a 63 y.o. female with:    Active and Resolved Problems  Active Hospital Problems    Diagnosis  POA    **Pulmonary emboli [I26.99]  Yes    History of CVA in adulthood [Z86.73]  Not Applicable    Hodgkin's disease in remission [C81.90]  Yes    HTN (hypertension) [I10]  Yes    Dysphagia [R13.10]  Yes    GERD (gastroesophageal reflux disease) [K21.9]  Yes    HLD (hyperlipidemia) [E78.5]  Yes    Anxiety associated with depression [F41.8]  Yes    Pulmonary embolus [I26.99]  Yes    Colitis [K52.9]  Yes    Acute UTI (urinary tract infection) [N39.0]  Yes       Resolved Hospital Problems   No resolved problems to display.     #Pe's  At this time, unsure why the patient started having multiple Pe's.  Patient states she recently had covid, but there is also this new dysphagia in the setting of chronic GERD, which could be a new malignancy.  No duplex of BLE's done yet.  Currently on heparin GGT.  - Would like to transition patient to NOAC but she cannot take any oral medications due to dysphagia  - Venous Duplex BLEs    #Dysphagia  Given progressive worsening of PO intake in the setting of GERD now unable to even take pills, I am highly concerned for a esophageal or gastric malignancy.  I feel that she will need an EGD.  - Soft diet  - PPI IV daily  - GI consulted, appreciate recs    #UTI/Colitis  Apparently incidental findings on CT scan and UA at [facility].  S/p CTX x1.  - Moving to zosyn to cover both etiologies    #HTN/HLD/CVAhx  HDS  - Holding home meds as patient cannot swallow pills  - PRN labetalol IV    #Anxiety/Depression  Stable.  - Holding home meds as patient cannot swallow pills      DVT prophylaxis:  Medical DVT prophylaxis orders are present.    The patient desires to be as follows:    CODE STATUS:    Level Of Support Discussed With: Patient  Code Status (Patient has no pulse and is not breathing): CPR (Attempt to Resuscitate)  Medical Interventions (Patient has pulse or is breathing): Full Support      She designates Lesa Serrato who can be contacted at [phone] to make medical decisions on her behalf if She is incapable of doing so. Patient declared said statement while fully alert and oriented on 9/3/2023 during the course of this H&P.    Admission Status:  I believe this patient meets inpatient status.    Expected Length of Stay: 3-4 days    PDMP reviewed and consistent with patient reported medications.    I discussed the patient's findings and my recommendations with patient.      Signature:       Declan Cotto M.D.    This document has been  electronically signed by Declan Cotto MD on September 3, 2023 21:58 EDT   [facility] Hospitalist Team       Electronically signed by Declan Cotto MD at 09/03/23 2159          Emergency Department Notes        Nataliia Resendiz, RN at 09/03/23 1919          Pt in bed awake watching tv. No needs at this time.     Electronically signed by Nataliia Resendiz RN at 09/03/23 1919       Justine Khalil DO at 09/03/23 1551          Subjective  History of Present Illness  Patient is a 63-year-old female who presents emergency department for multiple complaints including fatigue, decreased appetite, weight loss.  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  Patient states for the last 6 weeks she has not been feeling well.  She states she has had decreased appetite, 18 pound weight loss, fatigue, intermittent episodes of vomiting and diarrhea.  She denies fever, chills, chest pain.  She does report some exertional dyspnea and shortness of breath, but denies cough, fever, chills, chest pain.  Patient denies any history of DVT/PE.  She does report some increased swelling in her lower legs, but she attributes that to not being able to take her home medications due to her decreased appetite.    Review of Systems   Constitutional:  Positive for fatigue and unexpected weight change. Negative for activity change and fever.   HENT:  Negative for congestion, rhinorrhea and sore throat.    Respiratory:  Positive for shortness of breath. Negative for cough.    Cardiovascular:  Negative for chest pain.   Gastrointestinal:  Positive for abdominal pain, diarrhea and nausea. Negative for vomiting.   Genitourinary:  Negative for dysuria.   Musculoskeletal:  Negative for back pain and myalgias.   Skin:  Negative for rash.   Neurological:  Negative for dizziness, light-headedness and headaches.   Psychiatric/Behavioral:  Negative for confusion.      History  reviewed. No pertinent past medical history.    No Known Allergies    History reviewed. No pertinent surgical history.    History reviewed. No pertinent family history.    Social History     Socioeconomic History    Marital status:    Tobacco Use    Smoking status: Never    Smokeless tobacco: Never   Vaping Use    Vaping Use: Never used   Substance and Sexual Activity    Alcohol use: Never    Drug use: Never    Sexual activity: Defer           Objective  Physical Exam  Vitals and nursing note reviewed.   Constitutional:       General: She is not in acute distress.     Appearance: Normal appearance. She is obese. She is not ill-appearing.   HENT:      Head: Normocephalic and atraumatic.      Right Ear: Tympanic membrane normal.      Left Ear: Tympanic membrane normal.      Nose: Nose normal. No congestion.      Mouth/Throat:      Mouth: Mucous membranes are moist.      Pharynx: No oropharyngeal exudate.   Eyes:      Conjunctiva/sclera: Conjunctivae normal.   Cardiovascular:      Rate and Rhythm: Regular rhythm. Tachycardia present. FrequentExtrasystoles are present.     Heart sounds: No murmur heard.  Pulmonary:      Effort: Pulmonary effort is normal.      Breath sounds: No wheezing, rhonchi or rales.   Abdominal:      General: Abdomen is flat.      Palpations: Abdomen is soft.      Tenderness: There is no abdominal tenderness. There is no guarding or rebound.   Musculoskeletal:         General: No swelling or tenderness. Normal range of motion.      Cervical back: Normal range of motion and neck supple.      Right lower le+ Pitting Edema present.      Left lower le+ Pitting Edema present.   Skin:     General: Skin is warm and dry.      Findings: No rash.   Neurological:      General: No focal deficit present.      Mental Status: She is alert and oriented to person, place, and time.       Procedures           ED Course  ED Course as of 23 1902   Sun Sep 03, 2023   1601 ECG 12 Lead Other;  fatigue  Sinus tachycardia rate 106, borderline left axis deviation, PVCs, right bundle branch block, ST depression anterolateral leads. [CO]   1611 WBC(!): 11.60 [CO]   1611 Hemoglobin: 12.0 [CO]   1641 Nitrite, UA(!): Positive [CO]   1642 Leukocytes, UA: Negative [CO]   1737 CT Angiogram Chest Pulmonary Embolism  Impression:     1. Multiple bilateral pulmonary emboli. No evidence of right heart strain.  2. Noncalcified 12 mm nodule within the left lower lobe. PET/CT scan would be recommended for further evaluation [CO]   1753 CT Abdomen Pelvis With Contrast  Impression:     1. Multiple lower lobe pulmonary emboli. 212 mm noncalcified pulmonary nodule within the left lower lobe. PET/CT scan would be recommended for further evaluation.  3. Bilateral adrenal nodules which are statistically adenomas however given the presence of the pulmonary nodule, these can be evaluated on future PET/CT.  4. Segmental circumferential bowel wall thickening involving the distal descending colon. Colon proximal to this is mildly distended. This could represent a short segment stricture. Consider colonoscopy for further evaluation. Additionally there is bowel   wall thickening involving the splenic flexure of the colon consistent with changes of colitis which may be infectious or inflammatory in etiology. [CO]      ED Course User Index  [CO] Grays KnobJustine, DO                                           Medical Decision Making  HAS-BLED Score for Major Bleeding Risk - MDCalc  Calculated on Sep 03 2023 5:49 PM  2 points -> Risk was 4.1% in one validation study (Lip 2011) and 1.88 bleeds per 100 patient-years in another validation study (Pisters 2010). Anticoagulation can be considered, however patient does have moderate risk for major bleeding (~2/100 patient-years).    Patient is a 63-year-old female presented to the emergency department for multiple complaints.  HPI as listed above.  On arrival she is afebrile, mildly tachycardic but  otherwise hemodynamically stable in no acute distress.  Physical examination she is obese, no focal findings other than some mild lower extremity bilateral edema.  Patient placed on cardiac monitor, IV established.  Differential diagnosis includes but is not limited to CHF exacerbation, pneumonia, gastroenteritis COVID, flu.  EKG is tachycardia with no ischemic changes.  Lab work showed hypokalemia of 2.9, this was replaced both p.o. and IV.  Normal kidney function.  Initial troponin is 10, repeat is 18.  BNP is in the 200s and is not consistent for acute CHF exacerbation.  D-dimer was elevated, CTA was obtained that does show bilateral pulmonary embolism without evidence of right heart strain.  Given BNP is not elevated this also goes along with no right ventricular heart strain.  Has bled score is moderate risk, but will place on heparin and admit for further work-up for bilateral PEs.        Problems Addressed:  Bilateral pulmonary embolism: complicated acute illness or injury  Hypokalemia: complicated acute illness or injury    Amount and/or Complexity of Data Reviewed  Labs: ordered. Decision-making details documented in ED Course.  Radiology: ordered. Decision-making details documented in ED Course.  ECG/medicine tests: ordered. Decision-making details documented in ED Course.    Risk  Prescription drug management.  Decision regarding hospitalization.        Final diagnoses:   Bilateral pulmonary embolism   Hypokalemia       ED Disposition  ED Disposition       ED Disposition   Decision to Admit    Condition   --    Comment   Level of Care: Telemetry [5]   Admitting Physician: TATIANA CROSS [[id number]]   Attending Physician: TATIANA CROSS [[id number]]   Patient Class: Inpatient [101]                 No follow-up provider specified.       Medication List      No changes were made to your prescriptions during this visit.           Electronically signed by Justine Khalil DO at 09/03/23 1903       Vital Signs (last 3 days)        Date/Time Temp Temp src Pulse Resp BP Patient Position SpO2    09/05/23 0754 98.1 (36.7) Oral -- 22 122/75 Lying --    09/05/23 0752 98.1 (36.7) Oral 87 25 122/75 Lying 93    09/05/23 0301 97.5 (36.4) Oral 103 23 116/70 Lying 97    09/05/23 0042 97.5 (36.4) Oral 100 25 111/66 Lying 98    09/04/23 2100 97.3 (36.3) Oral -- 17 -- Sitting --    09/04/23 1702 97.6 (36.4) Oral 89 18 100/67 Lying 96    09/04/23 1200 97.8 (36.6) Oral 88 20 92/61 Lying 94    09/04/23 0807 97.3 (36.3) Oral 83 16 103/74 Lying 93    09/04/23 0320 98 (36.7) Oral 86 20 101/56 Lying 91    09/04/23 0041 100.1 (37.8) Oral 93 18 106/68 Lying 94    09/03/23 1935 98 (36.7) -- 97 19 122/77 -- 99    09/03/23 1919 -- -- 96 16 122/77 -- 98    09/03/23 1825 98 (36.7) Temporal 92 16 127/90 Sitting 97    09/03/23 18:24:29 98 (36.7) Temporal 97 16 127/90 Sitting 97    09/03/23 18:06:56 98 (36.7) Temporal 96 16 127/90 Sitting 98    09/03/23 17:08:16 98 (36.7) Temporal 100 16 114/72 Sitting 97    09/03/23 17:06:25 98 (36.7) Temporal 102 16 -- Sitting 98    09/03/23 1530 97.9 (36.6) Oral 104 20 127/62 Sitting 99          Oxygen Therapy (last 2 days)       Date/Time SpO2 Device (Oxygen Therapy) Flow (L/min) Oxygen Concentration (%) ETCO2 (mmHg)    09/05/23 0754 -- room air -- -- --    09/05/23 0752 93 room air -- -- --    09/05/23 0301 97 room air -- -- --    09/05/23 0042 98 room air -- -- --    09/04/23 2100 -- room air -- -- --    09/04/23 1702 96 room air -- -- --    09/04/23 1200 94 room air -- -- --    09/04/23 0845 -- room air -- -- --    09/04/23 0807 93 room air -- -- --    09/04/23 0320 91 room air -- -- --    09/04/23 0041 94 room air -- -- --    09/03/23 2030 -- room air -- -- --    09/03/23 1935 99 -- -- -- --    09/03/23 1919 98 -- -- -- --    09/03/23 1825 97 room air -- -- --    09/03/23 18:24:29 97 room air -- -- --    09/03/23 18:06:56 98 room air -- -- --    09/03/23 17:08:16 97 room air -- -- --    09/03/23 17:06:25 98 room air -- -- --     23 1530 99 -- -- -- --          Facility-Administered Medications as of 2023   Medication Dose Route Frequency Provider Last Rate Last Admin    acetaminophen (TYLENOL) tablet 650 mg  650 mg Oral Q4H PRN Declan Cotto MD   650 mg at 23 0058    apixaban (ELIQUIS) tablet 10 mg  10 mg Oral BID Sheng Garvey MD   10 mg at 23 0941    Followed by    [START ON 2023] apixaban (ELIQUIS) tablet 5 mg  5 mg Oral BID Sheng Garvey MD        atorvastatin (LIPITOR) tablet 40 mg  40 mg Oral Nightly Sheng Garvey MD   40 mg at 23    [COMPLETED] barium sulfate oral suspension 135 mL  135 mL Oral Once in imaging Megan Julian MD   135 mL at 23 1026    sennosides-docusate (PERICOLACE) 8.6-50 MG per tablet 2 tablet  2 tablet Oral BID Declan Cotto MD   2 tablet at 23    And    polyethylene glycol (MIRALAX) packet 17 g  17 g Oral Daily PRN Declan Cotto MD        And    bisacodyl (DULCOLAX) EC tablet 5 mg  5 mg Oral Daily PRN Declan Cotto MD        And    bisacodyl (DULCOLAX) suppository 10 mg  10 mg Rectal Daily PRN Declan Cotto MD        Calcium Replacement - Follow Nurse / BPA Driven Protocol   Does not apply PRN Declan Cotto MD        [COMPLETED] cefTRIAXone (ROCEPHIN) 1,000 mg in sodium chloride 0.9 % 100 mL IVPB  1,000 mg Intravenous Once Justine Khalil, DO   Stopped at 23 1939    cetirizine (zyrTEC) tablet 10 mg  10 mg Oral Daily Sheng Garvey MD   10 mg at 23    clopidogrel (PLAVIX) tablet 75 mg  75 mg Oral Daily Sheng Garvey MD   75 mg at 23    [COMPLETED] furosemide (LASIX) injection 40 mg  40 mg Intravenous Daily Declan Cotto MD   40 mg at 2341    [COMPLETED] GI Cocktail (aluminum-magnesium hydroxide-simethicone 400-40 MG/5ML suspension 30 ML and Lidocaine Viscous HCl 2 % solution 15 ML)   Oral Once Declan Cotto MD   Given at 23 2311    [] heparin 61944 units/250 mL (100 units/mL) in 0.45 % NaCl infusion  17 Units/kg/hr Intravenous  Titrated Sheng Garvey MD   Stopped at 09/04/23 2100    [COMPLETED] iopamidol (ISOVUE-370) 76 % injection 100 mL  100 mL Intravenous Once in imaging [place], DO   100 mL at 09/03/23 1711    labetalol (NORMODYNE,TRANDATE) injection 10 mg  10 mg Intravenous Q4H PRN Declan Cotto MD        lactated ringers infusion  50 mL/hr Intravenous Continuous Declan Cotto MD 50 mL/hr at 09/04/23 0056 50 mL/hr at 09/04/23 0056    Magnesium Standard Dose Replacement - Follow Nurse / BPA Driven Protocol   Does not apply PRN Declan Cotto MD        metoclopramide (REGLAN) tablet 5 mg  5 mg Oral TID AC France Puentes APRN   5 mg at 09/05/23 0738    ondansetron (ZOFRAN) tablet 4 mg  4 mg Oral Q6H PRN Declan Cotto MD        Or    ondansetron (ZOFRAN) injection 4 mg  4 mg Intravenous Q6H PRN Declan Cotto MD        oxyCODONE (ROXICODONE) immediate release tablet 5 mg  5 mg Oral Q4H PRN Declan Cotto MD        pantoprazole (PROTONIX) injection 40 mg  40 mg Intravenous Q AM Declan Cotto MD   40 mg at 09/05/23 0738    Phosphorus Replacement - Follow Nurse / BPA Driven Protocol   Does not apply PRN Declan Cotto MD        [COMPLETED] piperacillin-tazobactam (ZOSYN) IVPB 4.5 g in 100 mL NS (CD)  4.5 g Intravenous Once Declan Cotto MD   4.5 g at 09/04/23 0046    piperacillin-tazobactam (ZOSYN) IVPB 4.5 g in 100 mL NS (CD)  4.5 g Intravenous Q8H Declan Cotto MD   4.5 g at 09/05/23 0738    [COMPLETED] potassium chloride (K-DUR,KLOR-CON) CR tablet 40 mEq  40 mEq Oral Once [place] Llanes, DO   40 mEq at 09/03/23 1702    [COMPLETED] potassium chloride (K-DUR,KLOR-CON) CR tablet 40 mEq  40 mEq Oral Q4H Sheng Garvey MD   40 mEq at 09/05/23 0950    [COMPLETED] potassium chloride 10 mEq in 100 mL IVPB  10 mEq Intravenous Once [place]ine Llanes DO   Stopped at 09/03/23 1806    Potassium Replacement - Follow Nurse / BPA Driven Protocol   Does not apply PRN Declan Cotto MD        sertraline (ZOLOFT) tablet 100 mg  100 mg Oral Daily Sheng Garvey MD   100 mg  at 09/05/23 0950    sod bicarb-citric acid-simethicone (GAS-X II) 2.21-1.53-0.04 g packet 1 packet  1 packet Oral Once in imaging Megan Julian MD        [COMPLETED] sodium chloride 0.9 % bolus 1,000 mL  1,000 mL Intravenous Once [place] Llanes,    Stopped at 09/03/23 1806    sodium chloride 0.9 % flush 10 mL  10 mL Intravenous PRN Declan Cotto MD        sodium chloride 0.9 % flush 10 mL  10 mL Intravenous Q12H Declan Cotto MD   10 mL at 09/05/23 0943    sodium chloride 0.9 % flush 10 mL  10 mL Intravenous PRN Declan Cotto MD        sodium chloride 0.9 % infusion 40 mL  40 mL Intravenous PRN Declan Cotto MD        temazepam (RESTORIL) capsule 15 mg  15 mg Oral Nightly PRN Declan Cotto MD         Lab Results (last 24 hours)       Procedure Component Value Units Date/Time    Manual Differential [[id number]]  (Abnormal) Collected: 09/05/23 0258    Specimen: Blood Updated: 09/05/23 0816     Neutrophil % 13.0 %      Lymphocyte % 51.0 %      Monocyte % 8.0 %      Eosinophil % 3.0 %      Basophil % 1.0 %      Metamyelocyte % 1.0 %      Myelocyte % 3.0 %      Atypical Lymphocyte % 13.0 %      Blasts % 6.0 %      Prolymphocyte % 1.0 %      Neutrophils Absolute 1.33 10*3/mm3      Lymphocytes Absolute 6.53 10*3/mm3      Monocytes Absolute 0.82 10*3/mm3      Eosinophils Absolute 0.31 10*3/mm3      Basophils Absolute 0.10 10*3/mm3      nRBC 2.0 /100 WBC      Yoli Cells Slight/1+     Target Cells Slight/1+     Schistocytes Slight/1+     Smudge Cells Slight/1+     Platelet Morphology Normal    Narrative:      Reviewed by Pathologist within the past 30 days on [id number] .      CBC & Differential [[id number]]  (Abnormal) Collected: 09/05/23 0258    Specimen: Blood Updated: 09/05/23 0816    Narrative:      The following orders were created for panel order CBC & Differential.  Procedure                               Abnormality         Status                     ---------                               -----------         ------                      CBC Auto Differential[[id number]]        Abnormal            Final result               Scan Slide[[id number]]                                       Final result                 Please view results for these tests on the individual orders.    Scan Slide [[id number]] Collected: 09/05/23 0258    Specimen: Blood Updated: 09/05/23 0816     Scan Slide --     Comment: See Manual Differential Results       CBC Auto Differential [[id number]]  (Abnormal) Collected: 09/05/23 0258    Specimen: Blood Updated: 09/05/23 0816     WBC 10.20 10*3/mm3      RBC 3.62 10*6/mm3      Hemoglobin 10.7 g/dL      Hematocrit 31.7 %      MCV 87.6 fL      MCH 29.5 pg      MCHC 33.7 g/dL      RDW 15.1 %      RDW-SD 48.6 fl      MPV 8.9 fL      Platelets 306 10*3/mm3     Narrative:      The previously reported component NRBC is no longer being reported. Previous result was 0.5 /100 WBC (Reference Range: 0.0-0.2 /100 WBC) on 9/5/2023 at 0333 EDT.    Pathology Consultation [[id number]] Collected: 09/03/23 1601    Specimen: Blood, Venous Line Updated: 09/05/23 0754    Comprehensive Metabolic Panel [[id number]]  (Abnormal) Collected: 09/05/23 0258    Specimen: Blood Updated: 09/05/23 0358     Glucose 90 mg/dL      BUN 10 mg/dL      Creatinine 0.63 mg/dL      Sodium 140 mmol/L      Potassium 3.6 mmol/L      Chloride 105 mmol/L      CO2 22.0 mmol/L      Calcium 7.6 mg/dL      Total Protein 4.8 g/dL      Albumin 2.0 g/dL      ALT (SGPT) 11 U/L      AST (SGOT) 20 U/L      Alkaline Phosphatase 88 U/L      Total Bilirubin 0.3 mg/dL      Globulin 2.8 gm/dL      A/G Ratio 0.7 g/dL      BUN/Creatinine Ratio 15.9     Anion Gap 13.0 mmol/L      eGFR 99.2 mL/min/1.73     Narrative:      GFR Normal >60  Chronic Kidney Disease <60  Kidney Failure <15      aPTT [[id number]]  (Abnormal) Collected: 09/05/23 0258    Specimen: Blood Updated: 09/05/23 0330     PTT 37.3 seconds     aPTT [[id number]]  (Normal) Collected: 09/04/23 1743    Specimen: Blood Updated: 09/04/23  1854     PTT 70.4 seconds     C-reactive Protein [[id number]]  (Abnormal) Collected: 09/04/23 0042    Specimen: Blood Updated: 09/04/23 1137     C-Reactive Protein 4.78 mg/dL           Operative/Procedure Notes (all)    No notes of this type exist for this encounter.          Physician Progress Notes (all)        Karely Cazares, APRN at 09/05/23 1023           LOS: 2 days   Patient Care Team:  Provider, No Known as PCP - Forrest Valero MD as PCP - Family Medicine      Subjective     Interval History:     Subjective: Patient reports that she is feeling better today.  States that she has been tolerating her diet without any nausea/vomiting or dysphagia.  However, she does complain of early satiety.  No diarrhea.      ROS:   No chest pain, shortness of breath, or cough.        Medication Review:     Current Facility-Administered Medications:     acetaminophen (TYLENOL) tablet 650 mg, 650 mg, Oral, Q4H PRN, Declan Cotto MD, 650 mg at 09/04/23 0058    apixaban (ELIQUIS) tablet 10 mg, 10 mg, Oral, BID, 10 mg at 09/05/23 0941 **FOLLOWED BY** [START ON 9/11/2023] apixaban (ELIQUIS) tablet 5 mg, 5 mg, Oral, BID, Sheng Garvey MD    atorvastatin (LIPITOR) tablet 40 mg, 40 mg, Oral, Nightly, Sheng Garvey MD, 40 mg at 09/04/23 2050    sennosides-docusate (PERICOLACE) 8.6-50 MG per tablet 2 tablet, 2 tablet, Oral, BID, 2 tablet at 09/04/23 2050 **AND** polyethylene glycol (MIRALAX) packet 17 g, 17 g, Oral, Daily PRN **AND** bisacodyl (DULCOLAX) EC tablet 5 mg, 5 mg, Oral, Daily PRN **AND** bisacodyl (DULCOLAX) suppository 10 mg, 10 mg, Rectal, Daily PRN, Declan Cotto MD    Calcium Replacement - Follow Nurse / BPA Driven Protocol, , Does not apply, PRN, Declan Cotto MD    cetirizine (zyrTEC) tablet 10 mg, 10 mg, Oral, Daily, Sheng Garvey MD, 10 mg at 09/05/23 0942    clopidogrel (PLAVIX) tablet 75 mg, 75 mg, Oral, Daily, Sheng Garvey MD, 75 mg at 09/05/23 0942    labetalol (NORMODYNE,TRANDATE) injection 10 mg, 10  mg, Intravenous, Q4H PRN, Declan Cotto MD    lactated ringers infusion, 50 mL/hr, Intravenous, Continuous, Declan Cotto MD, Last Rate: 50 mL/hr at 09/04/23 0056, 50 mL/hr at 09/04/23 0056    Magnesium Standard Dose Replacement - Follow Nurse / BPA Driven Protocol, , Does not apply, PRNKirti Stuart, MD    metoclopramide (REGLAN) tablet 5 mg, 5 mg, Oral, TID AC, France Puentes, APRN, 5 mg at 09/05/23 0738    ondansetron (ZOFRAN) tablet 4 mg, 4 mg, Oral, Q6H PRN **OR** ondansetron (ZOFRAN) injection 4 mg, 4 mg, Intravenous, Q6H PRN, Declan Cotto MD    oxyCODONE (ROXICODONE) immediate release tablet 5 mg, 5 mg, Oral, Q4H PRN, Declan Cotto MD    pantoprazole (PROTONIX) injection 40 mg, 40 mg, Intravenous, Q AM, Declan Cotto MD, 40 mg at 09/05/23 0738    Phosphorus Replacement - Follow Nurse / BPA Driven Protocol, , Does not apply, Kirti LEMOS Stuart, MD    piperacillin-tazobactam (ZOSYN) IVPB 4.5 g in 100 mL NS (CD), 4.5 g, Intravenous, Q8H, Declan Cotto MD, 4.5 g at 09/05/23 0738    Potassium Replacement - Follow Nurse / BPA Driven Protocol, , Does not apply, Kirti LEMOS Stuart, MD    sertraline (ZOLOFT) tablet 100 mg, 100 mg, Oral, Daily, Sheng Garvey MD, 100 mg at 09/05/23 0950    [COMPLETED] Insert peripheral IV, , , Once **AND** sodium chloride 0.9 % flush 10 mL, 10 mL, Intravenous, PRN, Declan Cotto MD    sodium chloride 0.9 % flush 10 mL, 10 mL, Intravenous, Q12H, Declan Cotto MD, 10 mL at 09/05/23 0943    sodium chloride 0.9 % flush 10 mL, 10 mL, Intravenous, PRN, Declan Cotto MD    sodium chloride 0.9 % infusion 40 mL, 40 mL, Intravenous, PRN, Declan Cotto MD    temazepam (RESTORIL) capsule 15 mg, 15 mg, Oral, Nightly PRN, Declan Cotto MD      Objective     Vital Signs  Vitals:    09/05/23 0042 09/05/23 0301 09/05/23 0752 09/05/23 0754   BP: 111/66 116/70 122/75 122/75   BP Location: Left arm Left arm Left arm Left arm   Patient Position: Lying Lying Lying Lying   Pulse: 100 103 87    Resp: 25 23 25 22   Temp: 97.5 °F (36.4  °C) 97.5 °F (36.4 °C) 98.1 °F (36.7 °C) 98.1 °F (36.7 °C)   TempSrc: Oral Oral Oral Oral   SpO2: 98% 97% 93%    Weight:       Height:           Physical Exam:     General Appearance:    Awake and alert, in no acute distress   Head:    Normocephalic, without obvious abnormality   Eyes:          Conjunctivae normal, anicteric sclera   Throat:   No oral lesions, no thrush, oral mucosa moist   Neck:   No adenopathy, supple, no JVD   Lungs:     respirations regular, even and unlabored   Abdomen:     Soft, non-tender, no rebound or guarding, non-distended   Rectal:     Deferred   Extremities:   No edema, no cyanosis   Skin:   No bruising or rash, no jaundice        Results Review:    CBC    Results from last 7 days   Lab Units 09/05/23  0258 09/04/23  0042 09/03/23  1601   WBC 10*3/mm3 10.20 10.30 11.60*   HEMOGLOBIN g/dL 10.7* 11.3* 12.0   PLATELETS 10*3/mm3 306 284 330     CMP   Results from last 7 days   Lab Units 09/05/23  0258 09/04/23  0042 09/03/23  1601   SODIUM mmol/L 140 136 133*   POTASSIUM mmol/L 3.6 3.8 2.9*   CHLORIDE mmol/L 105 105 100   CO2 mmol/L 22.0 22.0 24.6   BUN mg/dL 10 10 11   CREATININE mg/dL 0.63 0.64 0.83   GLUCOSE mg/dL 90 118* 112*   ALBUMIN g/dL 2.0* 2.4* 2.7*   BILIRUBIN mg/dL 0.3 0.4 0.5   ALK PHOS U/L 88 99 112   AST (SGOT) U/L 20 20 20   ALT (SGPT) U/L 11 16 16   MAGNESIUM mg/dL  --   --  1.8   LIPASE U/L  --   --  24     Cr Clearance Estimated Creatinine Clearance: 100.3 mL/min (by C-G formula based on SCr of 0.63 mg/dL).  Coag   Results from last 7 days   Lab Units 09/05/23  0258 09/04/23  1743 09/04/23  0853 09/04/23  0042 09/03/23  1831 09/03/23  1801   INR   --   --   --   --  1  --    APTT seconds 37.3* 70.4 137.1* 68.0  --  30.5*     HbA1C No results found for: HGBA1C  Blood Glucose No results found for: POCGLU  Infection     UA    Results from last 7 days   Lab Units 09/03/23  1553   NITRITE UA  Positive*     Radiology(recent) CT Abdomen Pelvis With Contrast    Result Date:  9/3/2023  Impression: 1. Multiple lower lobe pulmonary emboli. 212 mm noncalcified pulmonary nodule within the left lower lobe. PET/CT scan would be recommended for further evaluation. 3. Bilateral adrenal nodules which are statistically adenomas however given the presence of the pulmonary nodule, these can be evaluated on future PET/CT. 4. Segmental circumferential bowel wall thickening involving the distal descending colon. Colon proximal to this is mildly distended. This could represent a short segment stricture. Consider colonoscopy for further evaluation. Additionally there is bowel  wall thickening involving the splenic flexure of the colon consistent with changes of colitis which may be infectious or inflammatory in etiology. Electronically Signed: Blayne Orozco MD  9/3/2023 5:42 PM EDT  Workstation ID: [id number]    CT Angiogram Chest Pulmonary Embolism    Result Date: 9/3/2023  Impression: 1. Multiple bilateral pulmonary emboli. No evidence of right heart strain. 2. Noncalcified 12 mm nodule within the left lower lobe. PET/CT scan would be recommended for further evaluation. Electronically Signed: Blayne Orozco MD  9/3/2023 5:34 PM EDT  Workstation ID: [id number]        Assessment & Plan     ASSESSMENT:  -Early satiety  -Unintentional weight loss - 20lbs  -Pulmonary emboli  -Abnormal CT with circumferential thickening in the distal descending colon with proximal colon mildly distended and wall thickening at the splenic flexure  -Acute UTI  -Dyspepsia -improved with PPI  -Recent COVID-19 infection (~1 month ago)  -Personal history of colon polyps   -History of CVA on Plavix  -History of Hodgkin's in remission  -Anxiety/depression    PLAN:  Patient is a 63-year-old female admitted to the hospital on 9/3 with shortness of air.  Found to have multiple pulmonary embolisms as well as incidental findings consistent with colitis and possible colonic stricture on CT.  She has a history of a CVA on Plavix.  GI has been  consulted for dysphagia. Pt describing more early satiety rather than dysphagia.     Patient reports that she is feeling some better today.  States that she is tolerating her diet without any difficulty with nausea/vomiting or dysphagia.  Instead, complains of early satiety.  Continue PPI and Reglan.  No plans for endoscopy at this time due to acute PE and anticoagulation.  We will plan to add EGD as outpatient to the patient's already scheduled outpatient colonoscopy on 2023.  Abnormal CT noted.  However, the patient is not having abdominal pain or diarrhea.  Inflammatory markers pending.  Proceed with colonoscopy as scheduled in 2023 for further evaluation.  Okay for diet as tolerated from GI standpoint.  Okay for discharge from GI standpoint with outpatient follow-up in 4 to 6 weeks.  GI will be available as inpatient as needed.      Electronically signed by BREE Robison, 23, 10:23 AM EDT.             Electronically signed by Karely Cazares APRN at 23 1029       Sheng Garvey MD at 23 1350              [facility] Medicine Services   Daily Progress Note    Patient Name: Chelsea Lees  : 1960  MRN: [id number]  Primary Care Physician:  Provider, No Known  Date of admission: 9/3/2023  Date and Time of Service: 23  at 13:50 EDT       Subjective      Chief Complaint: Fatigue and Shortness of Breath       HPI:   Chelsea Lees is a 63 y.o. female with a CMH of hx of CVA, hx of hodgkins in remission, HLD, anxiety/depression, HTN who presented to [facility] on 9/3/2023 with shortness of breath and dysphagia.     Patient states that since  she has been short of breath.  She went to her primary providers and was thought to have a pulmonary process like asthma.  Symptoms grew worse over the months and finally last night, the patient felt like she needed to come to the ED for evaluation.  At  ED, they performed a CTA chest, and found the  patient had multiple Pulmonary Emboli.    Patient also states for the last few months she has had early satiety and abdominal fullness, associated with nausea and vomiting. She states she has lost over 15 lbs in the last month. Endorses emesis, nausea, and spitting food back up.  States she started taking OTC prilosec which improved the symptoms.    Interval History:   09/04/23 :  Patient was seen at bedside this morning. Patient reports improvement in her shortness of breath. She still has abdominal fullness and early satiety but denies any dysphagia. She  denies any diarrhea or constipation or urinary symptoms.           Objective       Vitals:   Temp:  [97.3 °F (36.3 °C)-100.1 °F (37.8 °C)] 97.3 °F (36.3 °C)  Heart Rate:  [] 83  Resp:  [16-20] 16  BP: (101-127)/(56-90) 103/74    Physical Exam  Constitutional:       Appearance: Normal appearance.   HENT:      Head: Normocephalic and atraumatic.      Nose: Nose normal.      Mouth/Throat:      Mouth: Mucous membranes are moist.   Eyes:      Pupils: Pupils are equal, round, and reactive to light.   Cardiovascular:      Rate and Rhythm: Normal rate and regular rhythm.      Pulses: Normal pulses.      Heart sounds: Normal heart sounds.   Pulmonary:      Effort: Pulmonary effort is normal.      Breath sounds: Normal breath sounds.   Abdominal:      General: Bowel sounds are normal.      Palpations: Abdomen is soft.   Musculoskeletal:         General: Normal range of motion.      Cervical back: Normal range of motion.   Skin:     General: Skin is warm.      Capillary Refill: Capillary refill takes less than 2 seconds.   Neurological:      General: No focal deficit present.      Mental Status: She is alert.   Psychiatric:         Mood and Affect: Mood normal.           Result Review:  I have personally reviewed the results from the time of this admission to 9/4/2023 13:50 EDT and agree with these findings:  [x]  Laboratory  [x]  Microbiology  []  Radiology  []   EKG/Telemetry   []  Cardiology/Vascular   []  Pathology  []  Old records  []  Other:  Most notable findings include:   CT angio chest (09/03/23):   Impression:   1. Multiple bilateral pulmonary emboli. No evidence of right heart strain.   2. Noncalcified 12 mm nodule within the left lower lobe. PET/CT scan would be recommended for further evaluation.    CT abdomen and pelvis with contrast (09/03/23):   Impression:   1. Multiple lower lobe pulmonary emboli. 212 mm noncalcified pulmonary nodule within the left lower lobe. PET/CT scan would be recommended for further evaluation.   3. Bilateral adrenal nodules which are statistically adenomas however given the presence of the pulmonary nodule, these can be evaluated on future PET/CT.   4. Segmental circumferential bowel wall thickening involving the distal descending colon. Colon proximal to this is mildly distended. This could represent a short segment stricture. Consider colonoscopy for further evaluation. Additionally there is bowel    wall thickening involving the splenic flexure of the colon consistent with changes of colitis which may be infectious or inflammatory in etiology.     CBC:      Lab 09/04/23  0042 09/03/23  1601   WBC 10.30 11.60*   HEMOGLOBIN 11.3* 12.0   HEMATOCRIT 34.5 37.2   PLATELETS 284 330   NEUTROS ABS 2.37 1.51*   MCV 88.1 87.9      CMP:        Lab 09/04/23  0042 09/03/23  1601   SODIUM 136 133*   POTASSIUM 3.8 2.9*   CHLORIDE 105 100   CO2 22.0 24.6   ANION GAP 9.0 8.4   BUN 10 11   CREATININE 0.64 0.83   EGFR 98.8 78.8   GLUCOSE 118* 112*   CALCIUM 8.0* 8.1*   MAGNESIUM  --  1.8   TOTAL PROTEIN 5.3* 6.0   ALBUMIN 2.4* 2.7*   GLOBULIN 2.9 3.3   ALT (SGPT) 16 16   AST (SGOT) 20 20   BILIRUBIN 0.4 0.5   ALK PHOS 99 112   LIPASE  --  24        Microbiology Results (last 10 days)       Procedure Component Value - Date/Time    COVID-19 and FLU A/B PCR - Swab, Nasopharynx [[id number]]  (Normal) Collected: 09/03/23 1010    Lab Status: Final result  Specimen: Swab from Nasopharynx Updated: 09/03/23 1627     COVID19 Not Detected     Influenza A PCR Not Detected     Influenza B PCR Not Detected    Narrative:      Fact sheet for providers: https://www.fda.gov/media/442050/download    Fact sheet for patients: https://www.fda.gov/media/193368/download    Test performed by PCR.                         Assessment & Plan      Brief Patient Summary:  Chelsea Lees is a 63 y.o. female with a CMH of hx of CVA, hx of hodgkins in remission, HLD, anxiety/depression, HTN who presented to [facility] on 9/3/2023 with shortness of breath and dysphagia. Admitted with diagnosis of multiple pulmonary emboli, significant weight loss/Early satiety - r/o GI malignancy.       Current Medications:  atorvastatin, 40 mg, Oral, Nightly  cetirizine, 10 mg, Oral, Daily  furosemide, 40 mg, Intravenous, Daily  metoclopramide, 5 mg, Oral, TID AC  pantoprazole, 40 mg, Intravenous, Q AM  piperacillin-tazobactam, 4.5 g, Intravenous, Q8H  senna-docusate sodium, 2 tablet, Oral, BID  sertraline, 100 mg, Oral, Daily  sodium chloride, 10 mL, Intravenous, Q12H       heparin, 17 Units/kg/hr, Last Rate: 14 Units/kg/hr (09/04/23 1256)  lactated ringers, 50 mL/hr, Last Rate: 50 mL/hr (09/04/23 0056)         Active Hospital Problems:  Active Hospital Problems    Diagnosis     **Pulmonary emboli     History of CVA in adulthood     Hodgkin's disease in remission     HTN (hypertension)     Dysphagia     GERD (gastroesophageal reflux disease)     HLD (hyperlipidemia)     Anxiety associated with depression     Pulmonary embolus     Colitis     Acute UTI (urinary tract infection)        Plan:   # Multiple Pulmonary Emboli  - Unclear cause -  Patient states she had COVID like symptoms about a month ago - but was never tested positive; Also with h/o early satiety/significant weight loss - need to r/o GI malignancy, Given h/o stroke could be hypercoagulable state - but patient will be started on DOAC on  discharge, may need outpatient hypercoagulability workup - will refer to hematology on discharge  - Currently on heparin drip  - Transition to PO eliquis today  - f/u Venous Duplex BLE  - Echo to evaluate for right heart strain and r/o pulmonary hypertension from CTEPH causing chronic dyspnea on exertion.      #  Early Satiety  # Significant weight loss  # ?Dysphagia  - Patient currently denies any dysphagia and has been tolerating PO diet and medications  - She states she has had early satiety, nausea and vomiting for the past few months and has lost about 15-20 lbs in that time.   - GI consulted  - Planned for barium swallow study  - She already has Colonoscopy scheduled in November, will likely need EGD also at that time  - continue PPI    # UTI/Colitis  # suspected colonic stricture  - CT abdomen and pelvis showed  - Segmental circumferential bowel wall thickening involving the distal descending colon, Colon proximal to this is mildly distended, this could represent a short segment stricture. Consider colonoscopy for further evaluation. Additionally there is bowel wall thickening involving the splenic flexure of the colon consistent with changes of colitis which may be infectious or inflammatory in etiology.  - GI consulted - f/u inflammatory markers, scheduled for colonoscopy in novemeber  - Continue with IV zosyn for now - will likely switch to PO ciprofloxacin and flagyl on discharge to complete 10 days course  - UA +ve but Patient denies any urinary symptoms currently     # Pulmonary Nodule  # Adrenal Nodule  - 12 mm noncalcified pulmonary nodule within the left lower lobe and Bilateral adrenal nodules which are statistically adenomas however given the presence of the pulmonary nodule, these can be evaluated on future PET/CT.   - Outpatiet pulmonology referral on discharge for further evaluation with PET/CT      # Hypertension  - Home meds on hold  - BP borderline  - Resume home meds if BP elevated    #  HLD  - c/w Statin    # h/o CVA   - h/o CVA in 2018  - patient on aspirin and plavix at home  - will DC aspirin and continue with plavis as patient is being started on DOAC as well for PE.     # Anxiety/Depression  - resume home meds    DVT prophylaxis:  Medical DVT prophylaxis orders are present. On heparin Gtt.       CODE STATUS:    Level Of Support Discussed With: Patient  Code Status (Patient has no pulse and is not breathing): CPR (Attempt to Resuscitate)  Medical Interventions (Patient has pulse or is breathing): Full Support        Disposition:  I expect patient to be discharged tomorrow, pending Echo, Duplex and barium swallow study.    Discussed the plan of care with the patient. Discussed with patient's RN.     Electronically signed by Sheng Garvey MD, 09/04/23, 13:50 EDT.  [facility] Hospitalist Team              Electronically signed by Sheng Garvey MD at 09/04/23 1411          Consult Notes (all)        France Puentes APRN at 09/04/23 0915        Consult Orders    1. Inpatient Gastroenterology Consult [[id number]] ordered by Declan Cotto MD at 09/03/23 2124              Attestation signed by Afshin Lance MD at 09/04/23 1427    I have reviewed this documentation and agree.  I have personally seen the patient with nurse practitioner and agree with plan.  I have reviewed labs and imaging.  I also performed an appropriate and complete medical decision making component to the history and physical.  63 y.o. female with history of CVA on Plavix, Hodgkin's in remission and anxiety  presented to the hospital with complaints of shortness of air.  She has been short of air for about 1 month and was found to have multiple pulmonary embolisms on CT.  She has also been having early satiety and intermittent and dysphagia since she had COVID 1 month ago.   She was also noted to have bowel wall thickening in the distal descending colon as well as at the splenic flexure but denies any abdominal pain diarrhea or  blood in the stool..   She has lost about 20 pounds in the past month..  She had nausea/vomiting 1 month ago when she had COVID-19, but none since.  Her abdomen is soft and nontender.  White count 10 hemoglobin 11 platelets 284.  She had colonoscopy 2021 with polyps removed by Dr. Medrano.  Since she is on blood thinner for PE and her dysphagia seems rather mild, we will hold off on EGD plan esophagram to further assess.  She may have postinfectious gastroparesis from recent COVID so we will add Reglan.  She is scheduled for colonoscopy in November and EGD can be performed the same time if dysphagia persists.  We will follow.                     GI CONSULT  NOTE:    Referring Provider:  Dr. Garvey    Chief complaint: SOA, dysphagia.     Subjective .     History of present illness: Chelsea Lees is a 63 y.o. female with history of CVA on Plavix, Hodgkin's in remission, anxiety, depression, hyperlipidemia, hypertension presented to the hospital with complaints of shortness of air and dysphagia.  She has been short of air for about 1 monthand was found to have multiple pulmonary embolisms on CT.  She was also noted to have bowel wall thickening in the distal descending colon as well as at the splenic flexure.  During interview, patient is actively eating breakfast and does not appear to have difficulty swallowing.  Patient denies the feeling of food getting stuck when swallowing.  She does have dysphagia to large pills but is able to swallow these individually with applesauce. Her main complaint is that she will eat a couple bites of something and immediately feel full.  This can occur with any food.  She has lost about 20 pounds due to this.  She had nausea/vomiting 1 month ago when she had COVID-19, but none since.  She does get heartburn/reflux and started a PPI in the spring with good improvement.  She denies abdominal pain.  Her last bowel movement was this morning and she typically has regular bowel movements.   She has been told she has chronic constipation and can sometimes skip 2 to 3 days between a bowel movement which is not out of her norm.  If this occurs, she will take Colace. Currently having formed stools. No diarrhea. S She denies bright red blood per rectum or melena.  She does state her stool and urine are malodorous.  She does confirm UTI symptoms. She was febrile last night.  In regards to her pulmonary embolisms, she denies a history of blood clots.  She has remained on Plavix.  No family history of clotting disorders that she is aware of.    Endo History:  5/2021 colonoscopy ([facility])-polyps (SSA with low-grade dysplasia)  No history of EGD    Past Medical History:  History reviewed. No pertinent past medical history.    Past Surgical History:  History reviewed. No pertinent surgical history.    Social History:  Social History     Tobacco Use    Smoking status: Never    Smokeless tobacco: Never   Vaping Use    Vaping Use: Never used   Substance Use Topics    Alcohol use: Never    Drug use: Never       Family History:  History reviewed. No pertinent family history.    Medications:  Medications Prior to Admission   Medication Sig Dispense Refill Last Dose    aspirin 81 MG EC tablet Take 1 tablet by mouth Daily.   Past Month    atorvastatin (LIPITOR) 40 MG tablet Take 1 tablet by mouth Every Night.   Past Month    cetirizine (zyrTEC) 10 MG tablet Take 1 tablet by mouth Daily.   Past Month    clopidogrel (PLAVIX) 75 MG tablet Take 1 tablet by mouth Daily.   Past Month    lisinopril-hydrochlorothiazide (PRINZIDE,ZESTORETIC) 20-12.5 MG per tablet Take 1 tablet by mouth Daily.   Past Month    sertraline (ZOLOFT) 100 MG tablet Take 1 tablet by mouth Daily.   Past Month       Scheduled Meds:furosemide, 40 mg, Intravenous, Daily  pantoprazole, 40 mg, Intravenous, Q AM  piperacillin-tazobactam, 4.5 g, Intravenous, Q8H  senna-docusate sodium, 2 tablet, Oral, BID  sodium chloride, 10 mL, Intravenous,  Q12H      Continuous Infusions:heparin, 17 Units/kg/hr, Last Rate: 17 Units/kg/hr (09/04/23 0715)  lactated ringers, 50 mL/hr, Last Rate: 50 mL/hr (09/04/23 0056)      PRN Meds:.  acetaminophen    senna-docusate sodium **AND** polyethylene glycol **AND** bisacodyl **AND** bisacodyl    Calcium Replacement - Follow Nurse / BPA Driven Protocol    labetalol    Magnesium Standard Dose Replacement - Follow Nurse / BPA Driven Protocol    ondansetron **OR** ondansetron    oxyCODONE    Phosphorus Replacement - Follow Nurse / BPA Driven Protocol    Potassium Replacement - Follow Nurse / BPA Driven Protocol    [COMPLETED] Insert peripheral IV **AND** sodium chloride    sodium chloride    sodium chloride    temazepam    ALLERGIES:  Patient has no known allergies.    ROS:  The following systems were reviewed and negative;   Constitution:  No fevers, chills, +unintentional weight loss  Skin: no rash, no jaundice  Eyes:  No blurry vision, no eye pain  HENT:  No change in hearing or smell  Resp:  No dyspnea or cough  CV:  No chest pain or palpitations  :  +dysuria, hematuria  Musculoskeletal:  No leg cramps or arthralgias  Neuro:  No tremor, no numbness  Psych:  No depression or confusion    Objective     Vital Signs:   Vitals:    09/03/23 1935 09/04/23 0041 09/04/23 0320 09/04/23 0807   BP: 122/77 106/68 101/56 103/74   BP Location:  Left arm Left arm Left arm   Patient Position:  Lying Lying Lying   Pulse: 97 93 86 83   Resp: 19 18 20 16   Temp: 98 °F (36.7 °C) 100.1 °F (37.8 °C) 98 °F (36.7 °C) 97.3 °F (36.3 °C)   TempSrc:  Oral Oral Oral   SpO2: 99% 94% 91% 93%   Weight:       Height:           Physical Exam:       General Appearance:    Awake and alert, in no acute distress   Head:    Normocephalic, without obvious abnormality, atraumatic   Throat:   No oral lesions, no thrush, oral mucosa moist   Lungs:     Respirations regular, even and unlabored   Chest Wall:    No abnormalities observed   Abdomen:     Soft, non-tender,  no rebound or guarding, non-distended, no hepatosplenomegaly   Rectal:     Deferred   Extremities:   Moves all extremities, no edema, no cyanosis   Pulses:   Pulses palpable and equal bilaterally   Skin:   No rash, no jaundice, normal palpation       Neurologic:   Cranial nerves 2 - 12 grossly intact, no asterixis       Results Review:   I reviewed the patient's labs and imaging.  CBC    Results from last 7 days   Lab Units 09/04/23  0042 09/03/23  1601   WBC 10*3/mm3 10.30 11.60*   HEMOGLOBIN g/dL 11.3* 12.0   PLATELETS 10*3/mm3 284 330     CMP   Results from last 7 days   Lab Units 09/04/23  0042 09/03/23  1601   SODIUM mmol/L 136 133*   POTASSIUM mmol/L 3.8 2.9*   CHLORIDE mmol/L 105 100   CO2 mmol/L 22.0 24.6   BUN mg/dL 10 11   CREATININE mg/dL 0.64 0.83   GLUCOSE mg/dL 118* 112*   ALBUMIN g/dL 2.4* 2.7*   BILIRUBIN mg/dL 0.4 0.5   ALK PHOS U/L 99 112   AST (SGOT) U/L 20 20   ALT (SGPT) U/L 16 16   MAGNESIUM mg/dL  --  1.8   LIPASE U/L  --  24     Cr Clearance Estimated Creatinine Clearance: 98.7 mL/min (by C-G formula based on SCr of 0.64 mg/dL).  Coag   Results from last 7 days   Lab Units 09/04/23  0042 09/03/23  1831 09/03/23  1801   INR   --  1  --    APTT seconds 68.0  --  30.5*     HbA1C No results found for: HGBA1C  Blood Glucose No results found for: POCGLU  Infection     UA    Results from last 7 days   Lab Units 09/03/23  1553   NITRITE UA  Positive*     Radiology(recent) CT Abdomen Pelvis With Contrast    Result Date: 9/3/2023  Impression: 1. Multiple lower lobe pulmonary emboli. 212 mm noncalcified pulmonary nodule within the left lower lobe. PET/CT scan would be recommended for further evaluation. 3. Bilateral adrenal nodules which are statistically adenomas however given the presence of the pulmonary nodule, these can be evaluated on future PET/CT. 4. Segmental circumferential bowel wall thickening involving the distal descending colon. Colon proximal to this is mildly distended. This could  represent a short segment stricture. Consider colonoscopy for further evaluation. Additionally there is bowel  wall thickening involving the splenic flexure of the colon consistent with changes of colitis which may be infectious or inflammatory in etiology. Electronically Signed: Blayne Orozco MD  9/3/2023 5:42 PM EDT  Workstation ID: [id number]    CT Angiogram Chest Pulmonary Embolism    Result Date: 9/3/2023  Impression: 1. Multiple bilateral pulmonary emboli. No evidence of right heart strain. 2. Noncalcified 12 mm nodule within the left lower lobe. PET/CT scan would be recommended for further evaluation. Electronically Signed: Blayne Orozco MD  9/3/2023 5:34 PM EDT  Workstation ID: [id number]        ASSESSMENT AND PLAN:    63-year-old female admitted to the hospital with shortness of air.  Found to have multiple pulmonary embolisms as well as incidental findings consistent with colitis and possible colonic stricture on CT.  She has a history of a CVA on Plavix.  GI has been consulted for dysphagia. Pt describing more early satiety rather than dysphagia.     -Early satiety  -Unintentional weight loss - 20lbs  -Pulmonary emboli  -Abnormal CT with circumferential thickening in the distal descending colon with proximal colon mildly distended and wall thickening at the splenic flexure  -Acute UTI  -Dyspepsia -improved with PPI  -Recent COVID-19 infection (~1 month ago)  -Personal history of colon polyps   -History of CVA on Plavix  -History of Hodgkin's in remission  -Anxiety/depression    Plan  Patient is not complaining of dysphagia, but does report early satiety after eating only small amounts of food which has caused unintentional weight loss of about 20lbs. Denies any feeling of food sticking. Her reflux symptoms have improved with PPI.  Would recommend EGD for further evaluation, however, she is high risk for sedation due to multiple pulmonary emboli.  She has been started on a heparin drip.  Bilateral lower  extremity ultrasound is pending.  We could consider evaluation noninvasively with esophagram first. Will discuss further with Dr. Lance. During interview, she is eating breakfast without difficulty. Could add on EGD to already scheduled colonoscopy in November pending pt progress.   In regards to her abnormal CT of the colon, she is without abdominal pain or diarrhea.  She is having daily bowel movements but can be constipated easily. There was concern for a colonic stricture at distal descending colon. Will check inflammatory markers. She is scheduled for surveillance colonoscopy in November. She has been started on Zosyn for her UTI as well as coverage for possible colitis. Continue to monitor.   Continue supportive care.     I discussed the patients findings and my recommendations with the patient.    We appreciate the referral    Electronically signed by BREE Mccloud, 09/04/23, 9:17 AM EDT.               Electronically signed by Afshin Lance MD at 09/04/23 5241

## 2023-09-05 NOTE — CASE MANAGEMENT/SOCIAL WORK
Case Management Discharge Note      Final Note: Home         Transportation Services  Private: Car    Final Discharge Disposition Code: 01 - home or self-care    used

## 2023-09-05 NOTE — PLAN OF CARE
Goal Outcome Evaluation:  Plan of Care Reviewed With: patient        Progress: no change          Pt currently resting abed. As per orders Heparin gtt stopped. Pt to have K replaced awaiting verifcation from pharm. VSS continuing to monitor.

## 2023-09-05 NOTE — PROGRESS NOTES
LOS: 2 days   Patient Care Team:  Provider, No Known as PCP - Forrest Valero MD as PCP - Family Medicine      Subjective     Interval History:     Subjective: Patient reports that she is feeling better today.  States that she has been tolerating her diet without any nausea/vomiting or dysphagia.  However, she does complain of early satiety.  No diarrhea.      ROS:   No chest pain, shortness of breath, or cough.        Medication Review:     Current Facility-Administered Medications:     acetaminophen (TYLENOL) tablet 650 mg, 650 mg, Oral, Q4H PRN, Declan Cotto MD, 650 mg at 09/04/23 0058    apixaban (ELIQUIS) tablet 10 mg, 10 mg, Oral, BID, 10 mg at 09/05/23 0941 **FOLLOWED BY** [START ON 9/11/2023] apixaban (ELIQUIS) tablet 5 mg, 5 mg, Oral, BID, Sheng Garvey MD    atorvastatin (LIPITOR) tablet 40 mg, 40 mg, Oral, Nightly, Sheng Garvey MD, 40 mg at 09/04/23 2050    sennosides-docusate (PERICOLACE) 8.6-50 MG per tablet 2 tablet, 2 tablet, Oral, BID, 2 tablet at 09/04/23 2050 **AND** polyethylene glycol (MIRALAX) packet 17 g, 17 g, Oral, Daily PRN **AND** bisacodyl (DULCOLAX) EC tablet 5 mg, 5 mg, Oral, Daily PRN **AND** bisacodyl (DULCOLAX) suppository 10 mg, 10 mg, Rectal, Daily PRN, Declan Cotto MD    Calcium Replacement - Follow Nurse / BPA Driven Protocol, , Does not apply, PRN, Declan Cotto MD    cetirizine (zyrTEC) tablet 10 mg, 10 mg, Oral, Daily, Sheng Garvey MD, 10 mg at 09/05/23 0942    clopidogrel (PLAVIX) tablet 75 mg, 75 mg, Oral, Daily, Sheng Garvey MD, 75 mg at 09/05/23 0942    labetalol (NORMODYNE,TRANDATE) injection 10 mg, 10 mg, Intravenous, Q4H PRN, Declan Cotto MD    lactated ringers infusion, 50 mL/hr, Intravenous, Continuous, Declan Cotto MD, Last Rate: 50 mL/hr at 09/04/23 0056, 50 mL/hr at 09/04/23 0056    Magnesium Standard Dose Replacement - Follow Nurse / BPA Driven Protocol, , Does not apply, PRN, Declan Cotto MD    metoclopramide (REGLAN) tablet 5 mg, 5 mg, Oral, TID AC,  France Puentes, APRN, 5 mg at 09/05/23 0738    ondansetron (ZOFRAN) tablet 4 mg, 4 mg, Oral, Q6H PRN **OR** ondansetron (ZOFRAN) injection 4 mg, 4 mg, Intravenous, Q6H PRN, Declan Cotto MD    oxyCODONE (ROXICODONE) immediate release tablet 5 mg, 5 mg, Oral, Q4H PRN, Declan Cotto MD    pantoprazole (PROTONIX) injection 40 mg, 40 mg, Intravenous, Q AM, Declan Cotto MD, 40 mg at 09/05/23 0738    Phosphorus Replacement - Follow Nurse / BPA Driven Protocol, , Does not apply, Kirti LEMOS Stuart, MD    piperacillin-tazobactam (ZOSYN) IVPB 4.5 g in 100 mL NS (CD), 4.5 g, Intravenous, Q8H, Declan Cotto MD, 4.5 g at 09/05/23 0738    Potassium Replacement - Follow Nurse / BPA Driven Protocol, , Does not apply, PRNKirti Stuart, MD    sertraline (ZOLOFT) tablet 100 mg, 100 mg, Oral, Daily, Sheng Garvey MD, 100 mg at 09/05/23 0950    [COMPLETED] Insert peripheral IV, , , Once **AND** sodium chloride 0.9 % flush 10 mL, 10 mL, Intravenous, PRN, Declan Cotto MD    sodium chloride 0.9 % flush 10 mL, 10 mL, Intravenous, Q12H, Declan Cotto MD, 10 mL at 09/05/23 0943    sodium chloride 0.9 % flush 10 mL, 10 mL, Intravenous, PRN, Declan Cotto MD    sodium chloride 0.9 % infusion 40 mL, 40 mL, Intravenous, PRN, Declan Cotto MD    temazepam (RESTORIL) capsule 15 mg, 15 mg, Oral, Nightly PRN, Declan Cotto MD      Objective     Vital Signs  Vitals:    09/05/23 0042 09/05/23 0301 09/05/23 0752 09/05/23 0754   BP: 111/66 116/70 122/75 122/75   BP Location: Left arm Left arm Left arm Left arm   Patient Position: Lying Lying Lying Lying   Pulse: 100 103 87    Resp: 25 23 25 22   Temp: 97.5 °F (36.4 °C) 97.5 °F (36.4 °C) 98.1 °F (36.7 °C) 98.1 °F (36.7 °C)   TempSrc: Oral Oral Oral Oral   SpO2: 98% 97% 93%    Weight:       Height:           Physical Exam:     General Appearance:    Awake and alert, in no acute distress   Head:    Normocephalic, without obvious abnormality   Eyes:          Conjunctivae normal, anicteric sclera   Throat:   No oral lesions,  no thrush, oral mucosa moist   Neck:   No adenopathy, supple, no JVD   Lungs:     respirations regular, even and unlabored   Abdomen:     Soft, non-tender, no rebound or guarding, non-distended   Rectal:     Deferred   Extremities:   No edema, no cyanosis   Skin:   No bruising or rash, no jaundice        Results Review:    CBC    Results from last 7 days   Lab Units 09/05/23  0258 09/04/23  0042 09/03/23  1601   WBC 10*3/mm3 10.20 10.30 11.60*   HEMOGLOBIN g/dL 10.7* 11.3* 12.0   PLATELETS 10*3/mm3 306 284 330     CMP   Results from last 7 days   Lab Units 09/05/23  0258 09/04/23  0042 09/03/23  1601   SODIUM mmol/L 140 136 133*   POTASSIUM mmol/L 3.6 3.8 2.9*   CHLORIDE mmol/L 105 105 100   CO2 mmol/L 22.0 22.0 24.6   BUN mg/dL 10 10 11   CREATININE mg/dL 0.63 0.64 0.83   GLUCOSE mg/dL 90 118* 112*   ALBUMIN g/dL 2.0* 2.4* 2.7*   BILIRUBIN mg/dL 0.3 0.4 0.5   ALK PHOS U/L 88 99 112   AST (SGOT) U/L 20 20 20   ALT (SGPT) U/L 11 16 16   MAGNESIUM mg/dL  --   --  1.8   LIPASE U/L  --   --  24     Cr Clearance Estimated Creatinine Clearance: 100.3 mL/min (by C-G formula based on SCr of 0.63 mg/dL).  Coag   Results from last 7 days   Lab Units 09/05/23  0258 09/04/23  1743 09/04/23  0853 09/04/23  0042 09/03/23  1831 09/03/23  1801   INR   --   --   --   --  1  --    APTT seconds 37.3* 70.4 137.1* 68.0  --  30.5*     HbA1C No results found for: HGBA1C  Blood Glucose No results found for: POCGLU  Infection     UA    Results from last 7 days   Lab Units 09/03/23  1553   NITRITE UA  Positive*     Radiology(recent) CT Abdomen Pelvis With Contrast    Result Date: 9/3/2023  Impression: 1. Multiple lower lobe pulmonary emboli. 212 mm noncalcified pulmonary nodule within the left lower lobe. PET/CT scan would be recommended for further evaluation. 3. Bilateral adrenal nodules which are statistically adenomas however given the presence of the pulmonary nodule, these can be evaluated on future PET/CT. 4. Segmental circumferential  bowel wall thickening involving the distal descending colon. Colon proximal to this is mildly distended. This could represent a short segment stricture. Consider colonoscopy for further evaluation. Additionally there is bowel  wall thickening involving the splenic flexure of the colon consistent with changes of colitis which may be infectious or inflammatory in etiology. Electronically Signed: Blayne Orozco MD  9/3/2023 5:42 PM EDT  Workstation ID: [id number]    CT Angiogram Chest Pulmonary Embolism    Result Date: 9/3/2023  Impression: 1. Multiple bilateral pulmonary emboli. No evidence of right heart strain. 2. Noncalcified 12 mm nodule within the left lower lobe. PET/CT scan would be recommended for further evaluation. Electronically Signed: Blayne Orozco MD  9/3/2023 5:34 PM EDT  Workstation ID: [id number]        Assessment & Plan     ASSESSMENT:  -Early satiety  -Unintentional weight loss - 20lbs  -Pulmonary emboli  -Abnormal CT with circumferential thickening in the distal descending colon with proximal colon mildly distended and wall thickening at the splenic flexure  -Acute UTI  -Dyspepsia -improved with PPI  -Recent COVID-19 infection (~1 month ago)  -Personal history of colon polyps   -History of CVA on Plavix  -History of Hodgkin's in remission  -Anxiety/depression    PLAN:  Patient is a 63-year-old female admitted to the hospital on 9/3 with shortness of air.  Found to have multiple pulmonary embolisms as well as incidental findings consistent with colitis and possible colonic stricture on CT.  She has a history of a CVA on Plavix.  GI has been consulted for dysphagia. Pt describing more early satiety rather than dysphagia.     Patient reports that she is feeling some better today.  States that she is tolerating her diet without any difficulty with nausea/vomiting or dysphagia.  Instead, complains of early satiety.  Continue PPI and Reglan.  No plans for endoscopy at this time due to acute PE and  anticoagulation.  We will plan to add EGD as outpatient to the patient's already scheduled outpatient colonoscopy on 11/13/2023.  Abnormal CT noted.  However, the patient is not having abdominal pain or diarrhea.  Inflammatory markers pending.  Proceed with colonoscopy as scheduled in 11/2023 for further evaluation.  Okay for diet as tolerated from GI standpoint.  Okay for discharge from GI standpoint with outpatient follow-up in 4 to 6 weeks.  GI will be available as inpatient as needed.      Electronically signed by BREE Robison, 09/05/23, 10:23 AM EDT.

## 2023-09-05 NOTE — DISCHARGE SUMMARY
[facility] Medicine Services  Discharge Summary    Date of Service: 2023  Patient Name: Chelsea Lees  : 1960  MRN: [id number]    Date of Admission: 9/3/2023  Discharge Diagnosis: PE.  Date of Discharge: 2023  Primary Care Physician: Provider, No Known      Presenting Problem:   Hypokalemia [E87.6]  Bilateral pulmonary embolism [I26.99]  Pulmonary embolus [I26.99]    Active and Resolved Hospital Problems:  Active Hospital Problems    Diagnosis POA    **Pulmonary emboli [I26.99] Yes    Pulmonary nodule 1 cm or greater in diameter [R91.1] Yes    Adrenal nodule [E27.8] Yes    History of CVA in adulthood [Z86.73] Not Applicable    Hodgkin's disease in remission [C81.90] Yes    HTN (hypertension) [I10] Yes    Dysphagia [R13.10] Yes    GERD (gastroesophageal reflux disease) [K21.9] Yes    HLD (hyperlipidemia) [E78.5] Yes    Anxiety associated with depression [F41.8] Yes    Pulmonary embolus [I26.99] Yes    Colitis [K52.9] Yes    Acute UTI (urinary tract infection) [N39.0] Yes      Resolved Hospital Problems   No resolved problems to display.         Hospital Course     Hospital Course:  Chelsea Lees is a 63 y.o. female  with a PMH of hx of CVA, hx of hodgkins in remission, hld, anxiety/depression, HTN who presented to [facility] on 9/3/2023 with shortness of breath and dysphagia.  Patient was found to have bilateral PE and was admitted to the hospital and was started on heparin drip.  She also was initially complaining of dysphagia and GI was consulted but on further investigation it seems like she had early satiety and not dysphagia.  She is now changed to p.o. Eliquis and is tolerating her diet well.  GI is not planning to do any scope at this point and they have recommended to get an EGD done at the same time as her colonoscopy which is scheduled for November of this year.  Patient is tolerating her medications well as well as eating her food okay but just getting  filled more quickly.  Clinically she is doing good and is ready to go home on Eliquis for 6 to 9 months.  GI has also added some Protonix and Reglan for the time being which so far has not helped much with the early satiety.        DISCHARGE Follow Up Recommendations for labs and diagnostics:       Reasons For Change In Medications and Indications for New Medications:      Day of Discharge     Vital Signs:  Temp:  [97.3 °F (36.3 °C)-98.1 °F (36.7 °C)] 98.1 °F (36.7 °C)  Heart Rate:  [] 88  Resp:  [17-25] 20  BP: (100-122)/(60-75) 108/60    Physical Exam:  Physical Exam  Constitutional:       General: She is not in acute distress.  HENT:      Head: Normocephalic and atraumatic.      Mouth/Throat:      Mouth: Mucous membranes are moist.      Pharynx: Oropharynx is clear.   Cardiovascular:      Rate and Rhythm: Normal rate and regular rhythm.      Pulses: Normal pulses.      Heart sounds: Normal heart sounds.   Pulmonary:      Effort: Pulmonary effort is normal.      Breath sounds: Normal breath sounds.   Abdominal:      Palpations: Abdomen is soft.      Tenderness: There is no abdominal tenderness.   Musculoskeletal:      Right lower leg: No edema.      Left lower leg: No edema.   Skin:     General: Skin is warm and dry.   Neurological:      General: No focal deficit present.      Mental Status: She is alert.   Psychiatric:         Mood and Affect: Mood normal.         Behavior: Behavior normal.            Pertinent  and/or Most Recent Results     LAB RESULTS:      Lab 09/05/23  0258 09/04/23  1743 09/04/23  0853 09/04/23  0042 09/03/23  1831 09/03/23  1801 09/03/23  1604 09/03/23  1601   WBC 10.20  --   --  10.30  --   --   --  11.60*   HEMOGLOBIN 10.7*  --   --  11.3*  --   --   --  12.0   HEMATOCRIT 31.7*  --   --  34.5  --   --   --  37.2   PLATELETS 306  --   --  284  --   --   --  330   NEUTROS ABS 1.33*  --   --  2.37  --   --   --  1.51*   EOS ABS 0.31  --   --   --   --   --   --   --    MCV 87.6  --    --  88.1  --   --   --  87.9   SED RATE  --   --   --  3  --   --   --   --    CRP  --   --   --  4.78*  --   --   --   --    PROTIME  --   --   --   --  14.3  --   --   --    APTT 37.3* 70.4 137.1* 68.0  --  30.5*  --   --    D DIMER QUANT  --   --   --   --   --   --  0.95*  --          Lab 09/05/23  0258 09/04/23  0042 09/03/23  1601   SODIUM 140 136 133*   POTASSIUM 3.6 3.8 2.9*   CHLORIDE 105 105 100   CO2 22.0 22.0 24.6   ANION GAP 13.0 9.0 8.4   BUN 10 10 11   CREATININE 0.63 0.64 0.83   EGFR 99.2 98.8 78.8   GLUCOSE 90 118* 112*   CALCIUM 7.6* 8.0* 8.1*   MAGNESIUM  --   --  1.8         Lab 09/05/23  0258 09/04/23  0042 09/03/23  1601   TOTAL PROTEIN 4.8* 5.3* 6.0   ALBUMIN 2.0* 2.4* 2.7*   GLOBULIN 2.8 2.9 3.3   ALT (SGPT) 11 16 16   AST (SGOT) 20 20 20   BILIRUBIN 0.3 0.4 0.5   ALK PHOS 88 99 112   LIPASE  --   --  24         Lab 09/03/23  1831 09/03/23  1801 09/03/23  1601   PROBNP  --   --  256.7   HSTROP T  --  18* 19*   PROTIME 14.3  --   --    INR 1  --   --                  Brief Urine Lab Results  (Last result in the past 365 days)        Color   Clarity   Blood   Leuk Est   Nitrite   Protein   CREAT   Urine HCG        09/03/23 1553 Dark Yellow   Slightly Cloudy   Negative   Negative   Positive   100 mg/dL (2+)                 Microbiology Results (last 10 days)       Procedure Component Value - Date/Time    COVID-19 and FLU A/B PCR - Swab, Nasopharynx [[id number]]  (Normal) Collected: 09/03/23 1555    Lab Status: Final result Specimen: Swab from Nasopharynx Updated: 09/03/23 1627     COVID19 Not Detected     Influenza A PCR Not Detected     Influenza B PCR Not Detected    Narrative:      Fact sheet for providers: https://www.fda.gov/media/271163/download    Fact sheet for patients: https://www.fda.gov/media/615212/download    Test performed by PCR.            CT Abdomen Pelvis With Contrast    Result Date: 9/3/2023  Impression: Impression: 1. Multiple lower lobe pulmonary emboli. 212 mm noncalcified  pulmonary nodule within the left lower lobe. PET/CT scan would be recommended for further evaluation. 3. Bilateral adrenal nodules which are statistically adenomas however given the presence of the pulmonary nodule, these can be evaluated on future PET/CT. 4. Segmental circumferential bowel wall thickening involving the distal descending colon. Colon proximal to this is mildly distended. This could represent a short segment stricture. Consider colonoscopy for further evaluation. Additionally there is bowel  wall thickening involving the splenic flexure of the colon consistent with changes of colitis which may be infectious or inflammatory in etiology. Electronically Signed: Blayne Orozco MD  9/3/2023 5:42 PM EDT  Workstation ID: [id number]    FL Esophagram Complete Single Contrast    Result Date: 9/5/2023  Impression: Impression: Patient unable to perform examination, and was returned to her room. Electronically Signed: Willis Coffey MD  9/5/2023 10:32 AM EDT  Workstation ID: [id number]    CT Angiogram Chest Pulmonary Embolism    Result Date: 9/3/2023  Impression: Impression: 1. Multiple bilateral pulmonary emboli. No evidence of right heart strain. 2. Noncalcified 12 mm nodule within the left lower lobe. PET/CT scan would be recommended for further evaluation. Electronically Signed: Blayne Orozco MD  9/3/2023 5:34 PM EDT  Workstation ID: [id number]     Results for orders placed during the hospital encounter of 09/03/23    Duplex Venous Lower Extremity - Bilateral CAR    Interpretation Summary    Normal bilateral lower extremity venous duplex scan.      Results for orders placed during the hospital encounter of 09/03/23    Duplex Venous Lower Extremity - Bilateral CAR    Interpretation Summary    Normal bilateral lower extremity venous duplex scan.      Results for orders placed during the hospital encounter of 09/03/23    Adult Transthoracic Echo Complete W/ Cont if Necessary Per Protocol    Interpretation Summary     Left ventricular systolic function is normal. Left ventricular ejection fraction appears to be 56 - 60%.    Left ventricular diastolic function is consistent with (grade I) impaired relaxation.    Estimated right ventricular systolic pressure from tricuspid regurgitation is normal (<35 mmHg).      Labs Pending at Discharge:      Procedures Performed           Consults:   Consults       Date and Time Order Name Status Description    9/3/2023  9:25 PM Inpatient Gastroenterology Consult Completed               Discharge Details        Discharge Medications        New Medications        Instructions Start Date   apixaban 5 MG tablet tablet  Commonly known as: ELIQUIS   10 mg, Oral, 2 Times Daily      apixaban 5 MG tablet tablet  Commonly known as: ELIQUIS   5 mg, Oral, 2 Times Daily   Start Date: September 11, 2023     metoclopramide 5 MG tablet  Commonly known as: REGLAN   5 mg, Oral, 3 Times Daily Before Meals      pantoprazole 40 MG EC tablet  Commonly known as: PROTONIX   40 mg, Oral, Every Early Morning   Start Date: September 6, 2023            Continue These Medications        Instructions Start Date   atorvastatin 40 MG tablet  Commonly known as: LIPITOR   40 mg, Oral, Nightly      cetirizine 10 MG tablet  Commonly known as: zyrTEC   10 mg, Oral, Daily      clopidogrel 75 MG tablet  Commonly known as: PLAVIX   75 mg, Oral, Daily      lisinopril-hydrochlorothiazide 20-12.5 MG per tablet  Commonly known as: PRINZIDE,ZESTORETIC   1 tablet, Oral, Daily      sertraline 100 MG tablet  Commonly known as: ZOLOFT   100 mg, Oral, Daily               No Known Allergies      Discharge Disposition:   Home or Self Care    Diet:  Hospital:  Diet Order   Procedures    Diet: Regular/House Diet; Texture: Soft to Chew (NDD 3); Soft to Chew: Chopped Meat; Fluid Consistency: Thin (IDDSI 0)         Discharge Activity:   Activity Instructions       Activity as Tolerated                CODE STATUS:  Code Status and Medical  Interventions:   Ordered at: 09/03/23 8683     Level Of Support Discussed With:    Patient     Code Status (Patient has no pulse and is not breathing):    CPR (Attempt to Resuscitate)     Medical Interventions (Patient has pulse or is breathing):    Full Support         No future appointments.    Additional Instructions for the Follow-ups that You Need to Schedule       Discharge Follow-up with PCP   As directed       Currently Documented PCP:    Provider, No Known    PCP Phone Number:    None     Follow Up Details: 1 week        Discharge Follow-up with Specified Provider: GI; 2 Months   As directed      To: GI   Follow Up: 2 Months                Time spent on Discharge including face to face service: 31 minutes      Signature: Electronically signed by Megan Julian MD, 09/05/23, 12:19 EDT.  [facility] Hospitalist Team

## 2023-09-05 NOTE — CASE MANAGEMENT/SOCIAL WORK
Discharge Planning Assessment   [facility]     Patient Name: Chelsea Lees  MRN: [id number]  Today's Date: 9/5/2023    Admit Date: 9/3/2023    Plan: Home at discharge.   Discharge Needs Assessment       Row Name 09/05/23 1438       Living Environment    People in Home alone    Current Living Arrangements home    Potentially Unsafe Housing Conditions none    Primary Care Provided by self    Provides Primary Care For no one    Family Caregiver if Needed sibling(s)    Quality of Family Relationships helpful;involved;supportive    Able to Return to Prior Arrangements yes       Resource/Environmental Concerns    Resource/Environmental Concerns none    Transportation Concerns none       Transition Planning    Patient/Family Anticipates Transition to home    Patient/Family Anticipated Services at Transition none    Transportation Anticipated family or friend will provide       Discharge Needs Assessment    Readmission Within the Last 30 Days no previous admission in last 30 days    Equipment Currently Used at Home none    Concerns to be Addressed discharge planning    Anticipated Changes Related to Illness none    Equipment Needed After Discharge none                   Discharge Plan       Row Name 09/05/23 1439       Plan    Plan Home at discharge.    Patient/Family in Agreement with Plan yes    Plan Comments Patient lives at home alone. Patient drives, sister will transport at discharge. Patient performs ADL. PCP and pharmacy confirmed. Denies financial assistance needs for medication and/or food. Denies HH and/or rehab needs             Demographic Summary       Row Name 09/05/23 1438       General Information    Admission Type inpatient    Arrived From emergency department    Referral Source admission list    Reason for Consult discharge planning    Preferred Language English                   Functional Status       Row Name 09/05/23 1438       Functional Status    Usual Activity Tolerance good    Current Activity  Tolerance good       Functional Status, IADL    Medications independent    Meal Preparation independent    Housekeeping independent    Laundry independent    Shopping independent           Met with patient in room.      Maintained distance greater than six feet and spent less than 15 minutes in the room.    Neda Reyes RN, BSN  3A/2A   [address] [address]  Phone: [phone]  Fax: [phone]

## 2023-09-06 ENCOUNTER — READMISSION MANAGEMENT (OUTPATIENT)
Dept: CALL CENTER | Facility: HOSPITAL | Age: 63
End: 2023-09-06
Payer: COMMERCIAL

## 2023-09-06 NOTE — OUTREACH NOTE
Prep Survey      Flowsheet Row Responses   [facility] patient discharged from? [facility]   Is LACE score < 7 ? No   Eligibility Readm Mgmt   Discharge diagnosis **Pulmonary emboli   Does the patient have one of the following disease processes/diagnoses(primary or secondary)? Other   Does the patient have Home health ordered? No   Is there a DME ordered? No   Medication alerts for this patient Eliquis   Prep survey completed? Yes            Florecita RODGERS - Registered Nurse

## 2023-09-10 LAB
INR PPP: 1.3 (ref 0.8–1.2)
PROTHROMBIN TIME: 14.3 SECONDS

## 2023-09-15 ENCOUNTER — READMISSION MANAGEMENT (OUTPATIENT)
Dept: CALL CENTER | Facility: HOSPITAL | Age: 63
End: 2023-09-15
Payer: COMMERCIAL

## 2023-09-15 NOTE — OUTREACH NOTE
Medical Week 2 Survey      Flowsheet Row Responses   [facility] patient discharged from? [facility]   Does the patient have one of the following disease processes/diagnoses(primary or secondary)? Other   Week 2 attempt successful? Yes   Call start time 0940   Discharge diagnosis **Pulmonary emboli   Call end time 0945   Medication alerts for this patient Eliquis   Meds reviewed with patient/caregiver? Yes   Is the patient having any side effects they believe may be caused by any medication additions or changes? No   Does the patient have all medications ordered at discharge? Yes   Is the patient taking all medications as directed (includes completed medication regime)? Yes   Does the patient have a primary care provider?  Yes   Has the patient kept scheduled appointments due by today? Yes   Has home health visited the patient within 72 hours of discharge? N/A   Psychosocial issues? No   Did the patient receive a copy of their discharge instructions? Yes   Nursing interventions Reviewed instructions with patient   What is the patient's perception of their health status since discharge? Improving   Is the patient/caregiver able to teach back signs and symptoms related to disease process for when to call PCP? Yes   Is the patient/caregiver able to teach back signs and symptoms related to disease process for when to call 911? Yes   Is the patient/caregiver able to teach back the hierarchy of who to call/visit for symptoms/problems? PCP, Specialist, Home health nurse, Urgent Care, ED, 911 Yes   If the patient is a current smoker, are they able to teach back resources for cessation? Not a smoker   Week 2 Call Completed? Yes   Call end time 0945            Ida JOHNSTON - Licensed Nurse

## 2023-09-19 LAB
QT INTERVAL: 373 MS
QTC INTERVAL: 495 MS

## 2023-10-17 ENCOUNTER — APPOINTMENT (OUTPATIENT)
Dept: GENERAL RADIOLOGY | Facility: HOSPITAL | Age: 63
End: 2023-10-17
Payer: COMMERCIAL

## 2023-10-17 ENCOUNTER — HOSPITAL ENCOUNTER (OUTPATIENT)
Facility: HOSPITAL | Age: 63
Setting detail: OBSERVATION
Discharge: NURSING, REHAB OR LONG-TERM CARE | End: 2023-10-20
Attending: INTERNAL MEDICINE | Admitting: HOSPITALIST
Payer: COMMERCIAL

## 2023-10-17 DIAGNOSIS — A49.9 UTI (URINARY TRACT INFECTION), BACTERIAL: ICD-10-CM

## 2023-10-17 DIAGNOSIS — N39.0 UTI (URINARY TRACT INFECTION), BACTERIAL: ICD-10-CM

## 2023-10-17 DIAGNOSIS — D64.9 ANEMIA, UNSPECIFIED TYPE: Primary | ICD-10-CM

## 2023-10-17 DIAGNOSIS — K92.1 MELENA: ICD-10-CM

## 2023-10-17 PROBLEM — R53.1 WEAKNESS GENERALIZED: Status: ACTIVE | Noted: 2023-10-17

## 2023-10-17 LAB
ABO GROUP BLD: NORMAL
ALBUMIN SERPL-MCNC: 1.6 G/DL (ref 3.5–5.2)
ALBUMIN/GLOB SERPL: 0.6 G/DL
ALP SERPL-CCNC: 144 U/L (ref 39–117)
ALT SERPL W P-5'-P-CCNC: 55 U/L (ref 1–33)
ANION GAP SERPL CALCULATED.3IONS-SCNC: 11 MMOL/L (ref 5–15)
ANISOCYTOSIS BLD QL: ABNORMAL
AST SERPL-CCNC: 45 U/L (ref 1–32)
B PARAPERT DNA SPEC QL NAA+PROBE: NOT DETECTED
B PERT DNA SPEC QL NAA+PROBE: NOT DETECTED
BACTERIA UR QL AUTO: ABNORMAL /HPF
BASOPHILS # BLD MANUAL: 0.12 10*3/MM3 (ref 0–0.2)
BASOPHILS NFR BLD MANUAL: 1 % (ref 0–1.5)
BILIRUB SERPL-MCNC: 0.3 MG/DL (ref 0–1.2)
BILIRUB UR QL STRIP: ABNORMAL
BLD GP AB SCN SERPL QL: NEGATIVE
BUN SERPL-MCNC: 31 MG/DL (ref 8–23)
BUN/CREAT SERPL: 31 (ref 7–25)
C PNEUM DNA NPH QL NAA+NON-PROBE: NOT DETECTED
CALCIUM SPEC-SCNC: 7.5 MG/DL (ref 8.6–10.5)
CHLORIDE SERPL-SCNC: 100 MMOL/L (ref 98–107)
CLARITY UR: ABNORMAL
CO2 SERPL-SCNC: 23 MMOL/L (ref 22–29)
COLOR UR: ABNORMAL
CREAT SERPL-MCNC: 1 MG/DL (ref 0.57–1)
CRP SERPL-MCNC: 0.63 MG/DL (ref 0–0.5)
D DIMER PPP FEU-MCNC: <0.19 MG/L (FEU) (ref 0–0.64)
D-LACTATE SERPL-SCNC: 0.8 MMOL/L (ref 0.5–2)
DEPRECATED RDW RBC AUTO: 84.4 FL (ref 37–54)
EGFRCR SERPLBLD CKD-EPI 2021: 63 ML/MIN/1.73
ERYTHROCYTE [DISTWIDTH] IN BLOOD BY AUTOMATED COUNT: 23.1 % (ref 12.3–15.4)
ERYTHROCYTE [SEDIMENTATION RATE] IN BLOOD: <1 MM/HR (ref 0–30)
FLUAV SUBTYP SPEC NAA+PROBE: NOT DETECTED
FLUBV RNA ISLT QL NAA+PROBE: NOT DETECTED
GLOBULIN UR ELPH-MCNC: 2.7 GM/DL
GLUCOSE SERPL-MCNC: 152 MG/DL (ref 65–99)
GLUCOSE UR STRIP-MCNC: NEGATIVE MG/DL
HADV DNA SPEC NAA+PROBE: NOT DETECTED
HCOV 229E RNA SPEC QL NAA+PROBE: NOT DETECTED
HCOV HKU1 RNA SPEC QL NAA+PROBE: NOT DETECTED
HCOV NL63 RNA SPEC QL NAA+PROBE: NOT DETECTED
HCOV OC43 RNA SPEC QL NAA+PROBE: NOT DETECTED
HCT VFR BLD AUTO: 23.3 % (ref 34–46.6)
HGB BLD-MCNC: 7.6 G/DL (ref 12–15.9)
HGB UR QL STRIP.AUTO: NEGATIVE
HMPV RNA NPH QL NAA+NON-PROBE: NOT DETECTED
HPIV1 RNA ISLT QL NAA+PROBE: NOT DETECTED
HPIV2 RNA SPEC QL NAA+PROBE: NOT DETECTED
HPIV3 RNA NPH QL NAA+PROBE: NOT DETECTED
HPIV4 P GENE NPH QL NAA+PROBE: NOT DETECTED
HYALINE CASTS UR QL AUTO: ABNORMAL /LPF
HYPOCHROMIA BLD QL: ABNORMAL
KETONES UR QL STRIP: ABNORMAL
LEUKOCYTE ESTERASE UR QL STRIP.AUTO: ABNORMAL
LYMPHOCYTES # BLD MANUAL: 2.83 10*3/MM3 (ref 0.7–3.1)
LYMPHOCYTES NFR BLD MANUAL: 1 % (ref 5–12)
M PNEUMO IGG SER IA-ACNC: NOT DETECTED
MCH RBC QN AUTO: 33.3 PG (ref 26.6–33)
MCHC RBC AUTO-ENTMCNC: 32.6 G/DL (ref 31.5–35.7)
MCV RBC AUTO: 102.3 FL (ref 79–97)
METAMYELOCYTES NFR BLD MANUAL: 1 % (ref 0–0)
MONOCYTES # BLD: 0.12 10*3/MM3 (ref 0.1–0.9)
NEUTROPHILS # BLD AUTO: 8.61 10*3/MM3 (ref 1.7–7)
NEUTROPHILS NFR BLD MANUAL: 71 % (ref 42.7–76)
NEUTS BAND NFR BLD MANUAL: 2 % (ref 0–5)
NITRITE UR QL STRIP: POSITIVE
NRBC SPEC MANUAL: 4 /100 WBC (ref 0–0.2)
NT-PROBNP SERPL-MCNC: 221.9 PG/ML (ref 0–900)
PH UR STRIP.AUTO: 5.5 [PH] (ref 5–8)
PLAT MORPH BLD: NORMAL
PLATELET # BLD AUTO: 550 10*3/MM3 (ref 140–450)
PMV BLD AUTO: 8.8 FL (ref 6–12)
POTASSIUM SERPL-SCNC: 3.3 MMOL/L (ref 3.5–5.2)
PROT SERPL-MCNC: 4.3 G/DL (ref 6–8.5)
PROT UR QL STRIP: ABNORMAL
RBC # BLD AUTO: 2.28 10*6/MM3 (ref 3.77–5.28)
RBC # UR STRIP: ABNORMAL /HPF
REF LAB TEST METHOD: ABNORMAL
RH BLD: POSITIVE
RHINOVIRUS RNA SPEC NAA+PROBE: NOT DETECTED
RSV RNA NPH QL NAA+NON-PROBE: NOT DETECTED
SARS-COV-2 RNA NPH QL NAA+NON-PROBE: NOT DETECTED
SCAN SLIDE: NORMAL
SODIUM SERPL-SCNC: 134 MMOL/L (ref 136–145)
SP GR UR STRIP: 1.03 (ref 1–1.03)
SQUAMOUS #/AREA URNS HPF: ABNORMAL /HPF
T&S EXPIRATION DATE: NORMAL
UROBILINOGEN UR QL STRIP: ABNORMAL
VARIANT LYMPHS NFR BLD MANUAL: 1 % (ref 0–5)
VARIANT LYMPHS NFR BLD MANUAL: 23 % (ref 19.6–45.3)
WBC # UR STRIP: ABNORMAL /HPF
WBC MORPH BLD: NORMAL
WBC NRBC COR # BLD: 11.8 10*3/MM3 (ref 3.4–10.8)

## 2023-10-17 PROCEDURE — 86900 BLOOD TYPING SEROLOGIC ABO: CPT | Performed by: INTERNAL MEDICINE

## 2023-10-17 PROCEDURE — 25010000002 POTASSIUM CHLORIDE 10 MEQ/100ML SOLUTION: Performed by: INTERNAL MEDICINE

## 2023-10-17 PROCEDURE — 85025 COMPLETE CBC W/AUTO DIFF WBC: CPT | Performed by: NURSE PRACTITIONER

## 2023-10-17 PROCEDURE — 96367 TX/PROPH/DG ADDL SEQ IV INF: CPT

## 2023-10-17 PROCEDURE — 86140 C-REACTIVE PROTEIN: CPT | Performed by: NURSE PRACTITIONER

## 2023-10-17 PROCEDURE — 86850 RBC ANTIBODY SCREEN: CPT | Performed by: INTERNAL MEDICINE

## 2023-10-17 PROCEDURE — G0378 HOSPITAL OBSERVATION PER HR: HCPCS

## 2023-10-17 PROCEDURE — 85379 FIBRIN DEGRADATION QUANT: CPT | Performed by: NURSE PRACTITIONER

## 2023-10-17 PROCEDURE — 96365 THER/PROPH/DIAG IV INF INIT: CPT

## 2023-10-17 PROCEDURE — 83880 ASSAY OF NATRIURETIC PEPTIDE: CPT | Performed by: NURSE PRACTITIONER

## 2023-10-17 PROCEDURE — 85007 BL SMEAR W/DIFF WBC COUNT: CPT | Performed by: NURSE PRACTITIONER

## 2023-10-17 PROCEDURE — 93005 ELECTROCARDIOGRAM TRACING: CPT | Performed by: NURSE PRACTITIONER

## 2023-10-17 PROCEDURE — 36415 COLL VENOUS BLD VENIPUNCTURE: CPT | Performed by: INTERNAL MEDICINE

## 2023-10-17 PROCEDURE — 86900 BLOOD TYPING SEROLOGIC ABO: CPT

## 2023-10-17 PROCEDURE — 25010000002 CEFTRIAXONE PER 250 MG: Performed by: NURSE PRACTITIONER

## 2023-10-17 PROCEDURE — 99284 EMERGENCY DEPT VISIT MOD MDM: CPT

## 2023-10-17 PROCEDURE — 25810000003 SODIUM CHLORIDE 0.9 % SOLUTION

## 2023-10-17 PROCEDURE — 87040 BLOOD CULTURE FOR BACTERIA: CPT | Performed by: NURSE PRACTITIONER

## 2023-10-17 PROCEDURE — 86901 BLOOD TYPING SEROLOGIC RH(D): CPT | Performed by: INTERNAL MEDICINE

## 2023-10-17 PROCEDURE — 85652 RBC SED RATE AUTOMATED: CPT | Performed by: NURSE PRACTITIONER

## 2023-10-17 PROCEDURE — 96375 TX/PRO/DX INJ NEW DRUG ADDON: CPT

## 2023-10-17 PROCEDURE — 0202U NFCT DS 22 TRGT SARS-COV-2: CPT | Performed by: NURSE PRACTITIONER

## 2023-10-17 PROCEDURE — 96366 THER/PROPH/DIAG IV INF ADDON: CPT

## 2023-10-17 PROCEDURE — 80053 COMPREHEN METABOLIC PANEL: CPT | Performed by: NURSE PRACTITIONER

## 2023-10-17 PROCEDURE — 83605 ASSAY OF LACTIC ACID: CPT | Performed by: NURSE PRACTITIONER

## 2023-10-17 PROCEDURE — P9016 RBC LEUKOCYTES REDUCED: HCPCS

## 2023-10-17 PROCEDURE — 81001 URINALYSIS AUTO W/SCOPE: CPT | Performed by: NURSE PRACTITIONER

## 2023-10-17 PROCEDURE — 36430 TRANSFUSION BLD/BLD COMPNT: CPT

## 2023-10-17 PROCEDURE — 86923 COMPATIBILITY TEST ELECTRIC: CPT

## 2023-10-17 PROCEDURE — 86901 BLOOD TYPING SEROLOGIC RH(D): CPT

## 2023-10-17 PROCEDURE — 71045 X-RAY EXAM CHEST 1 VIEW: CPT

## 2023-10-17 RX ORDER — FUROSEMIDE 20 MG/1
20 TABLET ORAL DAILY
COMMUNITY

## 2023-10-17 RX ORDER — FUROSEMIDE 20 MG/1
20 TABLET ORAL DAILY
Status: DISCONTINUED | OUTPATIENT
Start: 2023-10-17 | End: 2023-10-20 | Stop reason: HOSPADM

## 2023-10-17 RX ORDER — SODIUM CHLORIDE 0.9 % (FLUSH) 0.9 %
10 SYRINGE (ML) INJECTION AS NEEDED
Status: DISCONTINUED | OUTPATIENT
Start: 2023-10-17 | End: 2023-10-20 | Stop reason: HOSPADM

## 2023-10-17 RX ORDER — PANTOPRAZOLE SODIUM 40 MG/10ML
40 INJECTION, POWDER, LYOPHILIZED, FOR SOLUTION INTRAVENOUS ONCE
Status: COMPLETED | OUTPATIENT
Start: 2023-10-17 | End: 2023-10-17

## 2023-10-17 RX ORDER — ATORVASTATIN CALCIUM 40 MG/1
40 TABLET, FILM COATED ORAL NIGHTLY
Status: DISCONTINUED | OUTPATIENT
Start: 2023-10-17 | End: 2023-10-20 | Stop reason: HOSPADM

## 2023-10-17 RX ORDER — POTASSIUM CHLORIDE 7.45 MG/ML
10 INJECTION INTRAVENOUS
Status: COMPLETED | OUTPATIENT
Start: 2023-10-17 | End: 2023-10-18

## 2023-10-17 RX ORDER — SENNOSIDES 8.6 MG
1300 CAPSULE ORAL EVERY 8 HOURS PRN
COMMUNITY

## 2023-10-17 RX ORDER — METOCLOPRAMIDE 5 MG/1
5 TABLET ORAL
Status: DISCONTINUED | OUTPATIENT
Start: 2023-10-17 | End: 2023-10-20 | Stop reason: HOSPADM

## 2023-10-17 RX ORDER — CETIRIZINE HYDROCHLORIDE 10 MG/1
10 TABLET ORAL DAILY
Status: DISCONTINUED | OUTPATIENT
Start: 2023-10-17 | End: 2023-10-20 | Stop reason: HOSPADM

## 2023-10-17 RX ORDER — ACETAMINOPHEN 500 MG
500 TABLET ORAL EVERY 6 HOURS PRN
Status: DISCONTINUED | OUTPATIENT
Start: 2023-10-17 | End: 2023-10-20 | Stop reason: HOSPADM

## 2023-10-17 RX ORDER — PANTOPRAZOLE SODIUM 40 MG/10ML
40 INJECTION, POWDER, LYOPHILIZED, FOR SOLUTION INTRAVENOUS EVERY 12 HOURS SCHEDULED
Status: DISCONTINUED | OUTPATIENT
Start: 2023-10-18 | End: 2023-10-20 | Stop reason: HOSPADM

## 2023-10-17 RX ORDER — SODIUM CHLORIDE 9 MG/ML
50 INJECTION, SOLUTION INTRAVENOUS CONTINUOUS
Status: DISPENSED | OUTPATIENT
Start: 2023-10-17 | End: 2023-10-18

## 2023-10-17 RX ORDER — SERTRALINE HYDROCHLORIDE 100 MG/1
100 TABLET, FILM COATED ORAL NIGHTLY
Status: DISCONTINUED | OUTPATIENT
Start: 2023-10-17 | End: 2023-10-20 | Stop reason: HOSPADM

## 2023-10-17 RX ORDER — NITROGLYCERIN 0.4 MG/1
0.4 TABLET SUBLINGUAL
Status: DISCONTINUED | OUTPATIENT
Start: 2023-10-17 | End: 2023-10-20 | Stop reason: HOSPADM

## 2023-10-17 RX ADMIN — SODIUM CHLORIDE 50 ML/HR: 9 INJECTION, SOLUTION INTRAVENOUS at 20:32

## 2023-10-17 RX ADMIN — PANTOPRAZOLE SODIUM 40 MG: 40 INJECTION, POWDER, FOR SOLUTION INTRAVENOUS at 15:16

## 2023-10-17 RX ADMIN — SERTRALINE 100 MG: 100 TABLET, FILM COATED ORAL at 20:33

## 2023-10-17 RX ADMIN — POTASSIUM CHLORIDE 10 MEQ: 7.46 INJECTION, SOLUTION INTRAVENOUS at 20:33

## 2023-10-17 RX ADMIN — POTASSIUM CHLORIDE 10 MEQ: 7.46 INJECTION, SOLUTION INTRAVENOUS at 22:35

## 2023-10-17 RX ADMIN — CEFTRIAXONE 2000 MG: 2 INJECTION, POWDER, FOR SOLUTION INTRAMUSCULAR; INTRAVENOUS at 15:16

## 2023-10-17 RX ADMIN — ATORVASTATIN CALCIUM 40 MG: 40 TABLET, FILM COATED ORAL at 20:33

## 2023-10-17 NOTE — ED NOTES
Nursing report ED to floor  Chelsea Lees  63 y.o.  female    HPI:   Chief Complaint   Patient presents with    Weakness - Generalized       Admitting doctor:   Megan Julian MD    Admitting diagnosis:   The primary encounter diagnosis was Anemia, unspecified type. A diagnosis of UTI (urinary tract infection), bacterial was also pertinent to this visit.    Code status:   Current Code Status       Date Active Code Status Order ID Comments User Context       10/17/2023 1542 CPR (Attempt to Resuscitate) [id number]  Jaelyn Rios PA-C ED        Question Answer    Code Status (Patient has no pulse and is not breathing) CPR (Attempt to Resuscitate)    Medical Interventions (Patient has pulse or is breathing) Full Support    Level Of Support Discussed With Patient                    Allergies:   Patient has no known allergies.    Isolation:  No active isolations     Fall Risk:  Fall Risk Assessment was completed, and patient is at moderate risk for falls.   Predictive Model Details         11 (Low) Factor Value    Calculated 10/17/2023 17:37 Age 63    Risk of Fall Model Musculoskeletal Assessment WDL     Active Peripheral IV Present     Imaging order in this encounter Present     Albumin 1.6 g/dL     Skin Assessment WDL     Magnesium not on file     Calcium 7.5 mg/dL     Diastolic BP 66     Number of Distinct Medication Classes administered 3     Drug Use No     Shivam Scale not on file     Financial Class Private Insurance     Peripheral Vascular Assessment WDL     Cardiac Assessment X     Chloride 100 mmol/L     ALT 55 U/L     Number of administrations of Ulcer Drugs 1     Gastrointestinal Assessment WDL     Creatinine 1 mg/dL     Respiratory Rate 16     Days after Admission 0.258     Total Protein 4.3 g/dL     Total Bilirubin 0.3 mg/dL     Potassium 3.3 mmol/L         Weight:       10/17/23  1123   Weight: 78.5 kg (173 lb)       Intake and Output  No intake or output data in the 24 hours ending 10/17/23  "1737    Diet:   Dietary Orders (From admission, onward)       Start     Ordered    10/17/23 1137  NPO Diet NPO Type: Strict NPO  Diet Effective Now        Question:  NPO Type  Answer:  Strict NPO    10/17/23 1137                     Most recent vitals:   Vitals:    10/17/23 1123 10/17/23 1601 10/17/23 1630 10/17/23 1732   BP: 111/62 98/65 96/58 111/66   Pulse: 73 99 101 101   Resp: 16 15 15 16   Temp: 98 °F (36.7 °C) 98.4 °F (36.9 °C) 98.1 °F (36.7 °C)    TempSrc:  Oral     SpO2: 94% 99% 98% 98%   Weight: 78.5 kg (173 lb)      Height: 170.2 cm (67\")          Active LDAs/IV Access:   Lines, Drains & Airways       Active LDAs       Name Placement date Placement time Site Days    Peripheral IV 10/17/23 1327 Left Antecubital 10/17/23  1327  Antecubital  less than 1                    Skin Condition:   Skin Assessments (last day)       None             Labs (abnormal labs have a star):   Labs Reviewed   COMPREHENSIVE METABOLIC PANEL - Abnormal; Notable for the following components:       Result Value    Glucose 152 (*)     BUN 31 (*)     Sodium 134 (*)     Potassium 3.3 (*)     Calcium 7.5 (*)     Total Protein 4.3 (*)     Albumin 1.6 (*)     ALT (SGPT) 55 (*)     AST (SGOT) 45 (*)     Alkaline Phosphatase 144 (*)     BUN/Creatinine Ratio 31.0 (*)     All other components within normal limits    Narrative:     GFR Normal >60  Chronic Kidney Disease <60  Kidney Failure <15     URINALYSIS W/ CULTURE IF INDICATED - Abnormal; Notable for the following components:    Color, UA Dark Yellow (*)     Appearance, UA Cloudy (*)     Ketones, UA 15 mg/dL (1+) (*)     Bilirubin, UA Moderate (2+) (*)     Protein, UA 30 mg/dL (1+) (*)     Leuk Esterase, UA Small (1+) (*)     Nitrite, UA Positive (*)     All other components within normal limits    Narrative:     In absence of clinical symptoms, the presence of pyuria, bacteria, and/or nitrites on the urinalysis result does not correlate with infection.   C-REACTIVE PROTEIN - Abnormal; " Notable for the following components:    C-Reactive Protein 0.63 (*)     All other components within normal limits   CBC WITH AUTO DIFFERENTIAL - Abnormal; Notable for the following components:    WBC 11.80 (*)     RBC 2.28 (*)     Hemoglobin 7.6 (*)     Hematocrit 23.3 (*)     .3 (*)     MCH 33.3 (*)     RDW 23.1 (*)     RDW-SD 84.4 (*)     Platelets 550 (*)     All other components within normal limits    Narrative:     The previously reported component NRBC is no longer being reported. Previous result was 3.6 /100 WBC (Reference Range: 0.0-0.2 /100 WBC) on 10/17/2023 at 1347 EDT.   URINALYSIS, MICROSCOPIC ONLY - Abnormal; Notable for the following components:    Bacteria, UA 3+ (*)     All other components within normal limits   MANUAL DIFFERENTIAL - Abnormal; Notable for the following components:    Monocyte % 1.0 (*)     Metamyelocyte % 1.0 (*)     Neutrophils Absolute 8.61 (*)     nRBC 4.0 (*)     All other components within normal limits   RESPIRATORY PANEL PCR W/ COVID-19 (SARS-COV-2) MAEGAN/SANDIE/TAYLA/PAD/COR/MAD/DAVID IN-HOUSE, NP SWAB IN UT/VTP, 3-4 HR TAT - Normal    Narrative:     In the setting of a positive respiratory panel with a viral infection PLUS a negative procalcitonin without other underlying concern for bacterial infection, consider observing off antibiotics or discontinuation of antibiotics and continue supportive care. If the respiratory panel is positive for atypical bacterial infection (Bordetella pertussis, Chlamydophila pneumoniae, or Mycoplasma pneumoniae), consider antibiotic de-escalation to target atypical bacterial infection.   BNP (IN-HOUSE) - Normal    Narrative:     This assay is used as an aid in the diagnosis of individuals suspected of having heart failure. It can be used as an aid in the diagnosis of acute decompensated heart failure (ADHF) in patients presenting with signs and symptoms of ADHF to the emergency department (ED). In addition, NT-proBNP of <300 pg/mL indicates  "ADHF is not likely.    Age Range Result Interpretation  NT-proBNP Concentration (pg/mL:      <50             Positive            >450                   Gray                 300-450                    Negative             <300    50-75           Positive            >900                  Gray                300-900                  Negative            <300      >75             Positive            >1800                  Gray                300-1800                  Negative            <300   D-DIMER, QUANTITATIVE - Normal    Narrative:     According to the assay 's published package insert, a normal (<0.50 mg/L (FEU)) D-dimer result in conjunction with a non-high clinical probability assessment, excludes deep vein thrombosis (DVT) and pulmonary embolism (PE) with high sensitivity.    D-dimer values increase with age and this can make VTE exclusion of an older population difficult. To address this, the [employer], based on best available evidence and recent guidelines, recommends that clinicians use age-adjusted D-dimer thresholds in patients greater than 50 years of age with: a) a low probability of PE who do not meet all Pulmonary Embolism Rule Out Criteria, or b) in those with intermediate probability of PE.   The formula for an age-adjusted D-dimer cut-off is \"age/100\".  For example, a 60 year old patient would have an age-adjusted cut-off of 0.60 mg/L (FEU) and an 80 year old 0.80 mg/L (FEU).   SEDIMENTATION RATE - Normal   LACTIC ACID, PLASMA - Normal   BLOOD CULTURE   BLOOD CULTURE   SCAN SLIDE   HEMOGLOBIN   HEMOGLOBIN   PREPARE RBC   TYPE AND SCREEN   BB ARMBAND CHECK   CBC AND DIFFERENTIAL    Narrative:     The following orders were created for panel order CBC & Differential.  Procedure                               Abnormality         Status                     ---------                               -----------         ------                     CBC Auto " Differential[[id number]]        Abnormal            Final result               Scan Slide[[id number]]                                       Final result                 Please view results for these tests on the individual orders.       LOC: Person, Place, Time, and Situation    Telemetry:  Telemetry    Cardiac Monitoring Ordered: no    EKG:   ECG 12 Lead Other; generalized weakness   Preliminary Result   HEART RATE= 115  bpm   RR Interval= 524  ms   KS Interval= 135  ms   P Horizontal Axis= 5  deg   P Front Axis= 61  deg   QRSD Interval= 139  ms   QT Interval= 362  ms   QTcB= 500  ms   QRS Axis= 193  deg   T Wave Axis= 46  deg   - ABNORMAL ECG -   Sinus tachycardia   Right bundle branch block   When compared with ECG of 03-Sep-2023 15:54:30,   No significant change   Electronically Signed By:    Date and Time of Study: 2023-10-17 11:57:27          Medications Given in the ED:   Medications   sodium chloride 0.9 % flush 10 mL (has no administration in time range)   nitroglycerin (NITROSTAT) SL tablet 0.4 mg (has no administration in time range)   pantoprazole (PROTONIX) injection 40 mg (has no administration in time range)   atorvastatin (LIPITOR) tablet 40 mg (has no administration in time range)   cetirizine (zyrTEC) tablet 10 mg (has no administration in time range)   furosemide (LASIX) tablet 20 mg (has no administration in time range)   metoclopramide (REGLAN) tablet 5 mg (has no administration in time range)   sertraline (ZOLOFT) tablet 100 mg (has no administration in time range)   acetaminophen (TYLENOL) tablet 500 mg (has no administration in time range)   Potassium Replacement - Follow Nurse / BPA Driven Protocol (has no administration in time range)   pantoprazole (PROTONIX) injection 40 mg (40 mg Intravenous Given 10/17/23 1516)   cefTRIAXone (ROCEPHIN) 2,000 mg in sodium chloride 0.9 % 100 mL IVPB (2,000 mg Intravenous New Bag 10/17/23 1516)       Imaging results:  XR Chest 1 View    Result Date:  10/17/2023  Impression: No acute process identified. Electronically Signed: Jama Steward MD  10/17/2023 11:51 AM CDT  Workstation ID: [id number]     Social issues:   Social History     Socioeconomic History    Marital status:    Tobacco Use    Smoking status: Never    Smokeless tobacco: Never   Vaping Use    Vaping Use: Never used   Substance and Sexual Activity    Alcohol use: Never    Drug use: Never    Sexual activity: Defer       NIH Stroke Scale:  Interval: (not recorded)  1a. Level of Consciousness: (not recorded)  1b. LOC Questions: (not recorded)  1c. LOC Commands: (not recorded)  2. Best Gaze: (not recorded)  3. Visual: (not recorded)  4. Facial Palsy: (not recorded)  5a. Motor Arm, Left: (not recorded)  5b. Motor Arm, Right: (not recorded)  6a. Motor Leg, Left: (not recorded)  6b. Motor Leg, Right: (not recorded)  7. Limb Ataxia: (not recorded)  8. Sensory: (not recorded)  9. Best Language: (not recorded)  10. Dysarthria: (not recorded)  11. Extinction and Inattention (formerly Neglect): (not recorded)    Total (NIH Stroke Scale): (not recorded)     Additional notable assessment information:     Nursing report ED to floor:  3C    Angelika Medrano RN   10/17/23 17:37 EDT

## 2023-10-17 NOTE — NURSING NOTE
Patient arrived to the floor at 1810. Report received from Angelika. Blood Transfusing at 150 ml/hr. 30 ml left to run.

## 2023-10-17 NOTE — H&P
[facility] Medicine Services  History & Physical    Patient Name: Chelsea Lees  : 1960  MRN: [id number]  Primary Care Physician:  Camilla Camara APRN  Date of admission: 10/17/2023  Date and Time of Service: 10/17/23 at 1511    Subjective      Chief Complaint: Generalized weakness    History of Present Illness: Chelsea Lees is a 63 y.o. female who presented to [facility] on 10/17/2023 complaining of generalized weakness and lightheadedness for the past 3 weeks.  Patient was recently discharged on 2023, she had presented to the hospital with shortness of breath and dysphagia and was diagnosed with bilateral PE and placed on Eliquis.  Patient states she started to experience black stools shortly after discharge, patient states approximately 2 weeks ago her stools changed to a black and green color. Patient denies any bright red blood in stool. Patient states she is not able to walk very far and she feels weak and has to rest frequently.  Patient has been experiencing shortness of breath since her last admission to the hospital with activity.  Patient notes 1 episode of diarrhea last night and denies any bright red blood.  Patient notes about a 30 pound weight loss in the past 1 to 2 months and she states she has been having early satiety.  Patient denies any nausea, vomiting, abdominal pain, chest pain, cough, headache, dizziness, syncope    In the ED, All vitals stable on admission except BP 98/65. All labs unremarkable except sodium 134, potassium 3.3, BUN 31, glucose 152, calcium 7.5, alkaline phosphatase 144, chloride albumin 1.6, AST 45, ALT 55, C-reactive protein 0.63, WBC 11.8, hemoglobin 7.6, hematocrit 23.3, platelets 550.  Patient received 1 unit of packed RBCs in ED. Hospitalist was contacted to admit patient for further care and management.     XR Chest 1 View    Result Date: 10/17/2023  Impression: No acute process identified. Electronically Signed: Jama  MD Bin  10/17/2023 11:51 AM CDT  Workstation ID: [id number]     ECG 12 Lead Other; generalized weakness   Preliminary Result   HEART RATE= 115  bpm   RR Interval= 524  ms   ND Interval= 135  ms   P Horizontal Axis= 5  deg   P Front Axis= 61  deg   QRSD Interval= 139  ms   QT Interval= 362  ms   QTcB= 500  ms   QRS Axis= 193  deg   T Wave Axis= 46  deg   - ABNORMAL ECG -   Sinus tachycardia   Right bundle branch block   When compared with ECG of 03-Sep-2023 15:54:30,   No significant change   Electronically Signed By:    Date and Time of Study: 2023-10-17 11:57:27          ROS 12 point ROS reviewed and negative except as mentioned above.      Personal History     Past Medical History:   Diagnosis Date    Cancer 09/19/1985    Stroke 12/14/2018       Past Surgical History:   Procedure Laterality Date    LYMPH NODE DISSECTION  1985    abdomen and neck    SPLENECTOMY  1985       Family History: family history includes Cancer in her brother; Heart attack in her father. Otherwise pertinent FHx was reviewed and not pertinent to current issue.    Social History:  reports that she has never smoked. She has never used smokeless tobacco. She reports that she does not drink alcohol and does not use drugs.    Home Medications:  Prior to Admission Medications       Prescriptions Last Dose Informant Patient Reported? Taking?    acetaminophen (TYLENOL) 650 MG 8 hr tablet 10/16/2023  Yes Yes    Take 2 tablets by mouth Every 8 (Eight) Hours As Needed for Mild Pain.    apixaban (ELIQUIS) 5 MG tablet tablet 10/16/2023  No Yes    Take 1 tablet by mouth 2 (Two) Times a Day. Indications: DVT/PE (active thrombosis)    atorvastatin (LIPITOR) 40 MG tablet 10/16/2023  Yes Yes    Take 1 tablet by mouth Every Night.    cetirizine (zyrTEC) 10 MG tablet 10/16/2023  Yes Yes    Take 1 tablet by mouth Daily.    clopidogrel (PLAVIX) 75 MG tablet 10/16/2023  Yes Yes    Take 1 tablet by mouth Daily.    furosemide (LASIX) 20 MG tablet Past Month  Yes  Yes    Take 1 tablet by mouth Daily.    metoclopramide (REGLAN) 5 MG tablet Past Month  No Yes    Take 1 tablet by mouth 3 (Three) Times a Day Before Meals.    pantoprazole (PROTONIX) 40 MG EC tablet 10/16/2023  No Yes    Take 1 tablet by mouth Every Morning.    sertraline (ZOLOFT) 100 MG tablet 10/16/2023  Yes Yes    Take 1 tablet by mouth Every Night.              Allergies:  No Known Allergies    Objective      Vitals:   Temp:  [98 °F (36.7 °C)-98.4 °F (36.9 °C)] 98.1 °F (36.7 °C)  Heart Rate:  [] 86  Resp:  [15-16] 16  BP: ()/(58-69) 123/69    Physical Exam  Vitals and nursing note reviewed.   HENT:      Head: Normocephalic.   Eyes:      Extraocular Movements: Extraocular movements intact.      Pupils: Pupils are equal, round, and reactive to light.   Cardiovascular:      Rate and Rhythm: Normal rate and regular rhythm.      Pulses: Normal pulses.      Heart sounds: Normal heart sounds.   Pulmonary:      Effort: Pulmonary effort is normal.      Breath sounds: Normal breath sounds.   Abdominal:      General: Bowel sounds are normal.      Palpations: Abdomen is soft.      Tenderness: There is no abdominal tenderness.   Musculoskeletal:         General: Normal range of motion.      Right lower leg: Edema present.      Left lower leg: Edema present.   Skin:     General: Skin is warm and dry.   Neurological:      Mental Status: She is alert and oriented to person, place, and time.   Psychiatric:         Mood and Affect: Mood normal.          Result Review    Result Review:  I have personally reviewed the results from the time of this admission to 10/17/2023 19:37 EDT and agree with these findings:  [x]  Laboratory  [x]  Microbiology  [x]  Radiology  [x]  EKG/Telemetry   []  Cardiology/Vascular   []  Pathology  []  Old records  []  Other:  Most notable findings include:     CBC:      Lab 10/17/23  1326 10/13/23  1231   WBC 11.80* 14.74*   HEMOGLOBIN 7.6* 8.8*   HEMATOCRIT 23.3* 25.7*   PLATELETS 550* 597*    NEUTROS ABS 8.61* 5.01   LYMPHS ABS  --  8.75*   MONOS ABS  --  0.86   EOS ABS  --  0.10   .3* 95.5     CMP:        Lab 10/17/23  1326   SODIUM 134*   POTASSIUM 3.3*   CHLORIDE 100   CO2 23.0   ANION GAP 11.0   BUN 31*   CREATININE 1.00   EGFR 63.0   GLUCOSE 152*   CALCIUM 7.5*   TOTAL PROTEIN 4.3*   ALBUMIN 1.6*   GLOBULIN 2.7   ALT (SGPT) 55*   AST (SGOT) 45*   BILIRUBIN 0.3   ALK PHOS 144*           Assessment & Plan        Active Hospital Problems:  Active Hospital Problems    Diagnosis     **Weakness generalized     Pulmonary emboli     Acute UTI (urinary tract infection)     GERD (gastroesophageal reflux disease)      Plan:   Generalized weakness  lightheadedness  Melena  Shortness of breath  Recent history of PE  Anemia  -Weakness, dizziness, shortness of breath likely related to anemia which may be due to blood loss  -Chest x-ray radiology report states no acute process identified  -Patient currently stable on room air  -Hemoglobin 7.6  -1 unit PRBC ordered by ED provider as patient is symptomatic  -Continue to monitor hemoglobin and hematocrit every 6 hours  -Transfuse for hemoglobin less than 7  -IV Protonix ordered  -Clear liquid diet ordered for now, NPO after midnight  -Gentle IV fluid hydration ordered  -Hold home Eliquis and Plavix due to possible GI bleed  -GI consulted  -PT OT consulted for further evaluation of generalized weakness    Acute urinary tract infection  -WBC 11.8  -Patient is afebrile  -Blood cultures pending, continue to follow  -Patient denies any increased frequency or dysuria  -Urinalysis shows positive nitrite, 1+ leukocytes, 1+ protein, 2+ bilirubin, 3+ bacteria, cloudy appearance, dark yellow color  -Ceftriaxone initiated in the ED, continue    Hypokalemia  -Potassium replacement protocol ordered  -Continue to monitor labs    Bilateral lower extremity swelling  -Patient states she has not been taking full dose of Lasix because she thought it was contributing to her  weakness  -Echo from 9/4/2023 showed left ejection fraction to be 56 to 60%  -Continue home dose of Lasix  -Continue to monitor    Anxiety and depression  -Continue home Zoloft    Hyperlipidemia  -Continue home Lipitor    GERD   -Hold home protonix while on IV protonix    DVT prophylaxis:  Mechanical DVT prophylaxis orders are present.    CODE STATUS:    Level Of Support Discussed With: Patient  Code Status (Patient has no pulse and is not breathing): CPR (Attempt to Resuscitate)  Medical Interventions (Patient has pulse or is breathing): Full Support    Admission Status:  I believe this patient meets observation status.    I discussed the patient's findings and my recommendations with patient and family.    This patient has been examined wearing appropriate Personal Protective Equipment . 10/17/23      Signature: Electronically signed by Jaelyn Rios PA-C, 10/17/23, 19:37 EDT.  [facility] Hospitalist Team

## 2023-10-17 NOTE — ED PROVIDER NOTES
Subjective   History of Present Illness  63-year-old  female presents to the emergency room with complaint of generalized weakness for the past 3 weeks.  She denies chest pain shortness of breath nausea vomiting or fever.  Patient states she had a little bit of diarrhea yesterday.  Patient states that about a week and a half ago she had few days of black stool and since then it has not been black but its been very dark.  Patient states that she was hospitalized Labor Day weekend of this year and was put on Protonix so she has been taking Protonix ever since.  Patient also states that she takes Eliquis for history of PEs.  Patient states she had COVID in August 2023.  Patient states that she saw her primary care physician who is Johann Camara about 2 weeks ago and she referred her to a hematologist who is Dr. Etienne.      Review of Systems   Constitutional:  Positive for activity change, appetite change and fatigue.   Respiratory:  Negative for cough, chest tightness and shortness of breath.    Cardiovascular:  Negative for chest pain, palpitations and leg swelling.   Gastrointestinal:  Positive for diarrhea and nausea. Negative for abdominal pain and rectal pain.   Genitourinary:  Negative for dysuria and urgency.   Skin:  Negative for color change, rash and wound.   Neurological:  Positive for weakness and light-headedness.   Psychiatric/Behavioral: Negative.         No past medical history on file.    No Known Allergies    No past surgical history on file.    No family history on file.    Social History     Socioeconomic History    Marital status:    Tobacco Use    Smoking status: Never    Smokeless tobacco: Never   Vaping Use    Vaping Use: Never used   Substance and Sexual Activity    Alcohol use: Never    Drug use: Never    Sexual activity: Defer           Objective   Physical Exam  Constitutional:       General: She is not in acute distress.     Appearance: She is ill-appearing. She is not  toxic-appearing or diaphoretic.   HENT:      Head: Normocephalic and atraumatic.      Mouth/Throat:      Mouth: Mucous membranes are dry.      Pharynx: Oropharynx is clear.   Eyes:      Extraocular Movements: Extraocular movements intact.      Pupils: Pupils are equal, round, and reactive to light.   Cardiovascular:      Rate and Rhythm: Normal rate.      Pulses: Normal pulses.   Pulmonary:      Effort: Pulmonary effort is normal.   Abdominal:      General: Abdomen is flat.      Palpations: Abdomen is soft.      Tenderness: There is no abdominal tenderness. There is no guarding or rebound.   Skin:     Capillary Refill: Capillary refill takes 2 to 3 seconds.      Coloration: Skin is pale.   Neurological:      General: No focal deficit present.      Mental Status: She is alert and oriented to person, place, and time.   Psychiatric:         Mood and Affect: Mood normal.         Behavior: Behavior normal.         Thought Content: Thought content normal.         Judgment: Judgment normal.         Procedures           ED Course      Labs Reviewed   COMPREHENSIVE METABOLIC PANEL - Abnormal; Notable for the following components:       Result Value    Glucose 152 (*)     BUN 31 (*)     Sodium 134 (*)     Potassium 3.3 (*)     Calcium 7.5 (*)     Total Protein 4.3 (*)     Albumin 1.6 (*)     ALT (SGPT) 55 (*)     AST (SGOT) 45 (*)     Alkaline Phosphatase 144 (*)     BUN/Creatinine Ratio 31.0 (*)     All other components within normal limits    Narrative:     GFR Normal >60  Chronic Kidney Disease <60  Kidney Failure <15     URINALYSIS W/ CULTURE IF INDICATED - Abnormal; Notable for the following components:    Color, UA Dark Yellow (*)     Appearance, UA Cloudy (*)     Ketones, UA 15 mg/dL (1+) (*)     Bilirubin, UA Moderate (2+) (*)     Protein, UA 30 mg/dL (1+) (*)     Leuk Esterase, UA Small (1+) (*)     Nitrite, UA Positive (*)     All other components within normal limits    Narrative:     In absence of clinical  symptoms, the presence of pyuria, bacteria, and/or nitrites on the urinalysis result does not correlate with infection.   C-REACTIVE PROTEIN - Abnormal; Notable for the following components:    C-Reactive Protein 0.63 (*)     All other components within normal limits   CBC WITH AUTO DIFFERENTIAL - Abnormal; Notable for the following components:    WBC 11.80 (*)     RBC 2.28 (*)     Hemoglobin 7.6 (*)     Hematocrit 23.3 (*)     .3 (*)     MCH 33.3 (*)     RDW 23.1 (*)     RDW-SD 84.4 (*)     Platelets 550 (*)     All other components within normal limits    Narrative:     The previously reported component NRBC is no longer being reported. Previous result was 3.6 /100 WBC (Reference Range: 0.0-0.2 /100 WBC) on 10/17/2023 at 1347 EDT.   URINALYSIS, MICROSCOPIC ONLY - Abnormal; Notable for the following components:    Bacteria, UA 3+ (*)     All other components within normal limits   MANUAL DIFFERENTIAL - Abnormal; Notable for the following components:    Monocyte % 1.0 (*)     Metamyelocyte % 1.0 (*)     Neutrophils Absolute 8.61 (*)     nRBC 4.0 (*)     All other components within normal limits   RESPIRATORY PANEL PCR W/ COVID-19 (SARS-COV-2) MAEGAN/SANDIE/TAYLA/PAD/[facility] IN-HOUSE, NP [facility] IN [place], 3-4 HR TAT - Normal    Narrative:     In the setting of a positive respiratory panel with a viral infection PLUS a negative procalcitonin without other underlying concern for bacterial infection, consider observing off antibiotics or discontinuation of antibiotics and continue supportive care. If the respiratory panel is positive for atypical bacterial infection (Bordetella pertussis, Chlamydophila pneumoniae, or Mycoplasma pneumoniae), consider antibiotic de-escalation to target atypical bacterial infection.   BNP (IN-HOUSE) - Normal    Narrative:     This assay is used as an aid in the diagnosis of individuals suspected of having heart failure. It can be used as an aid in the diagnosis of acute decompensated  "heart failure (ADHF) in patients presenting with signs and symptoms of ADHF to the emergency department (ED). In addition, NT-proBNP of <300 pg/mL indicates ADHF is not likely.    Age Range Result Interpretation  NT-proBNP Concentration (pg/mL:      <50             Positive            >450                   Gray                 300-450                    Negative             <300    50-75           Positive            >900                  Gray                300-900                  Negative            <300      >75             Positive            >1800                  Gray                300-1800                  Negative            <300   D-DIMER, QUANTITATIVE - Normal    Narrative:     According to the assay 's published package insert, a normal (<0.50 mg/L (FEU)) D-dimer result in conjunction with a non-high clinical probability assessment, excludes deep vein thrombosis (DVT) and pulmonary embolism (PE) with high sensitivity.    D-dimer values increase with age and this can make VTE exclusion of an older population difficult. To address this, the [employer], based on best available evidence and recent guidelines, recommends that clinicians use age-adjusted D-dimer thresholds in patients greater than 50 years of age with: a) a low probability of PE who do not meet all Pulmonary Embolism Rule Out Criteria, or b) in those with intermediate probability of PE.   The formula for an age-adjusted D-dimer cut-off is \"age/100\".  For example, a 60 year old patient would have an age-adjusted cut-off of 0.60 mg/L (FEU) and an 80 year old 0.80 mg/L (FEU).   SEDIMENTATION RATE - Normal   BLOOD CULTURE   BLOOD CULTURE   SCAN SLIDE   LACTIC ACID, PLASMA   PREPARE RBC   TYPE AND SCREEN   BB ARMBAND CHECK   CBC AND DIFFERENTIAL    Narrative:     The following orders were created for panel order CBC & Differential.  Procedure                               Abnormality         Status                  "    ---------                               -----------         ------                     CBC Auto Differential[[id number]]        Abnormal            Final result               Scan Slide[[id number]]                                       Final result                 Please view results for these tests on the individual orders.              XR Chest 1 View    Result Date: 10/17/2023  Impression: No acute process identified. Electronically Signed: Jama Steward MD  10/17/2023 11:51 AM CDT  Workstation ID: [id number]                   Medications   sodium chloride 0.9 % flush 10 mL (has no administration in time range)   pantoprazole (PROTONIX) injection 40 mg (has no administration in time range)   cefTRIAXone (ROCEPHIN) 2,000 mg in sodium chloride 0.9 % 100 mL IVPB (has no administration in time range)              Medical Decision Making  63-year-old  female presents to the ER for complaint of generalized weakness that is progressively worsened over the last 3 weeks.  Patient also also reports lightheadedness on exertion.  Patient states that about 7 to 10 days ago she had a few days of black stool but states that it is not black anymore but it is very dark since then.  Patient states that she is on Eliquis.    Problems Addressed:  UTI (urinary tract infection), bacterial:     Details: As evidenced by urinalysis of positive nitrites leuk leukocytes and bacteria.    Amount and/or Complexity of Data Reviewed  Labs: ordered.     Details: Hemoglobin is noted to be 7.6 which is down from 4 days ago of 8.8.  Most likely blood loss anemia and GI tract; reports of black and dark stool for past 7 to 10 days.  Radiology: ordered.     Details: Chest x-ray obtained and shows no acute process.  ECG/medicine tests: ordered.     Details: EKG shows sinus tach at 115.  Seen and signed by Dr. Miguel Angel hammond  Discussion of management or test interpretation with external provider(s): Discussed patient case and admission with Jaelyn  who is a PA with the hospitalist group.  She agrees to except.  2 g of Rocephin ordered and administered for acute UTI.  40 mg of Protonix ordered and administered for acute versus chronic GI bleeding-dark black stool and lightheadedness on standing.    Risk  Prescription drug management.  Decision regarding hospitalization.  Risk Details: Most likely blood loss anemia.  Will need close follow-up with GI specialist and possible colonoscopy and/or EGD for further investigation of GI bleed.        Final diagnoses:   Anemia, unspecified type   UTI (urinary tract infection), bacterial       ED Disposition  ED Disposition       ED Disposition   Decision to Admit    Condition   --    Comment   Level of Care: Telemetry [5]   Admitting Physician: RAMONA CARMONA [[id number]]   Attending Physician: RAMONA CARMONA [[id number]]                 No follow-up provider specified.       Medication List      No changes were made to your prescriptions during this visit.            Niya Hanson, APRN  10/17/23 [id number]

## 2023-10-17 NOTE — Clinical Note
Level of Care: Telemetry [5]   Admitting Physician: RAMONA CARMONA [[id number]]   Attending Physician: RAMONA CARMONA [[id number]]

## 2023-10-18 ENCOUNTER — ANESTHESIA (OUTPATIENT)
Dept: GASTROENTEROLOGY | Facility: HOSPITAL | Age: 63
End: 2023-10-18
Payer: COMMERCIAL

## 2023-10-18 ENCOUNTER — APPOINTMENT (OUTPATIENT)
Dept: ULTRASOUND IMAGING | Facility: HOSPITAL | Age: 63
End: 2023-10-18
Payer: COMMERCIAL

## 2023-10-18 ENCOUNTER — ANESTHESIA EVENT (OUTPATIENT)
Dept: GASTROENTEROLOGY | Facility: HOSPITAL | Age: 63
End: 2023-10-18
Payer: COMMERCIAL

## 2023-10-18 PROBLEM — D64.9 ANEMIA: Status: ACTIVE | Noted: 2023-10-17

## 2023-10-18 PROBLEM — K92.1 MELENA: Status: ACTIVE | Noted: 2023-10-17

## 2023-10-18 LAB
ALBUMIN SERPL-MCNC: 1.2 G/DL (ref 3.5–5.2)
ALBUMIN/GLOB SERPL: 0.5 G/DL
ALP SERPL-CCNC: 132 U/L (ref 39–117)
ALPHA1 GLOB MFR UR ELPH: 160 MG/DL (ref 90–200)
ALT SERPL W P-5'-P-CCNC: 48 U/L (ref 1–33)
ANION GAP SERPL CALCULATED.3IONS-SCNC: 10 MMOL/L (ref 5–15)
ANION GAP SERPL CALCULATED.3IONS-SCNC: 6 MMOL/L (ref 5–15)
ANISOCYTOSIS BLD QL: ABNORMAL
AST SERPL-CCNC: 50 U/L (ref 1–32)
BH BB BLOOD EXPIRATION DATE: NORMAL
BH BB BLOOD TYPE BARCODE: 9500
BH BB DISPENSE STATUS: NORMAL
BH BB PRODUCT CODE: NORMAL
BH BB UNIT NUMBER: NORMAL
BILIRUB SERPL-MCNC: 0.2 MG/DL (ref 0–1.2)
BLASTS NFR BLD MANUAL: 2 % (ref 0–0)
BUN SERPL-MCNC: 21 MG/DL (ref 8–23)
BUN SERPL-MCNC: 26 MG/DL (ref 8–23)
BUN/CREAT SERPL: 28 (ref 7–25)
BUN/CREAT SERPL: 34.2 (ref 7–25)
BURR CELLS BLD QL SMEAR: ABNORMAL
CALCIUM SPEC-SCNC: 7 MG/DL (ref 8.6–10.5)
CALCIUM SPEC-SCNC: 7.4 MG/DL (ref 8.6–10.5)
CERULOPLASMIN SERPL-MCNC: 13 MG/DL (ref 19–39)
CHLORIDE SERPL-SCNC: 104 MMOL/L (ref 98–107)
CHLORIDE SERPL-SCNC: 107 MMOL/L (ref 98–107)
CO2 SERPL-SCNC: 20 MMOL/L (ref 22–29)
CO2 SERPL-SCNC: 25 MMOL/L (ref 22–29)
CREAT SERPL-MCNC: 0.75 MG/DL (ref 0.57–1)
CREAT SERPL-MCNC: 0.76 MG/DL (ref 0.57–1)
CROSSMATCH INTERPRETATION: NORMAL
DEPRECATED RDW RBC AUTO: 83.1 FL (ref 37–54)
EGFRCR SERPLBLD CKD-EPI 2021: 87.6 ML/MIN/1.73
EGFRCR SERPLBLD CKD-EPI 2021: 89 ML/MIN/1.73
EOSINOPHIL # BLD MANUAL: 0.11 10*3/MM3 (ref 0–0.4)
EOSINOPHIL NFR BLD MANUAL: 1 % (ref 0.3–6.2)
ERYTHROCYTE [DISTWIDTH] IN BLOOD BY AUTOMATED COUNT: 25 % (ref 12.3–15.4)
FERRITIN SERPL-MCNC: 222 NG/ML (ref 13–150)
GGT SERPL-CCNC: 15 U/L (ref 5–36)
GLOBULIN UR ELPH-MCNC: 2.6 GM/DL
GLUCOSE SERPL-MCNC: 106 MG/DL (ref 65–99)
GLUCOSE SERPL-MCNC: 91 MG/DL (ref 65–99)
HAV IGM SERPL QL IA: NORMAL
HBV CORE IGM SERPL QL IA: NORMAL
HBV SURFACE AG SERPL QL IA: NORMAL
HCT VFR BLD AUTO: 27.3 % (ref 34–46.6)
HCT VFR BLD AUTO: 28.9 % (ref 34–46.6)
HCT VFR BLD AUTO: 30.4 % (ref 34–46.6)
HCV AB SER DONR QL: NORMAL
HGB BLD-MCNC: 9.1 G/DL (ref 12–15.9)
HGB BLD-MCNC: 9.5 G/DL (ref 12–15.9)
HGB BLD-MCNC: 9.9 G/DL (ref 12–15.9)
INR PPP: 1.25 (ref 0.93–1.1)
LYMPHOCYTES # BLD MANUAL: 6.5 10*3/MM3 (ref 0.7–3.1)
LYMPHOCYTES NFR BLD MANUAL: 5 % (ref 5–12)
MCH RBC QN AUTO: 32.3 PG (ref 26.6–33)
MCHC RBC AUTO-ENTMCNC: 33.3 G/DL (ref 31.5–35.7)
MCV RBC AUTO: 97 FL (ref 79–97)
METAMYELOCYTES NFR BLD MANUAL: 1 % (ref 0–0)
MONOCYTES # BLD: 0.57 10*3/MM3 (ref 0.1–0.9)
MYELOCYTES NFR BLD MANUAL: 2 % (ref 0–0)
NEUTROPHILS # BLD AUTO: 3.65 10*3/MM3 (ref 1.7–7)
NEUTROPHILS NFR BLD MANUAL: 31 % (ref 42.7–76)
NEUTS BAND NFR BLD MANUAL: 1 % (ref 0–5)
NRBC SPEC MANUAL: 12 /100 WBC (ref 0–0.2)
PATHOLOGY REVIEW: YES
PLAT MORPH BLD: NORMAL
PLATELET # BLD AUTO: 477 10*3/MM3 (ref 140–450)
PMV BLD AUTO: 9 FL (ref 6–12)
POIKILOCYTOSIS BLD QL SMEAR: ABNORMAL
POLYCHROMASIA BLD QL SMEAR: ABNORMAL
POTASSIUM SERPL-SCNC: 4.1 MMOL/L (ref 3.5–5.2)
POTASSIUM SERPL-SCNC: 4.2 MMOL/L (ref 3.5–5.2)
PROT SERPL-MCNC: 3.8 G/DL (ref 6–8.5)
PROTHROMBIN TIME: 13.4 SECONDS (ref 9.6–11.7)
QT INTERVAL: 362 MS
QTC INTERVAL: 500 MS
RBC # BLD AUTO: 2.81 10*6/MM3 (ref 3.77–5.28)
SCAN SLIDE: NORMAL
SMUDGE CELLS BLD QL SMEAR: ABNORMAL
SODIUM SERPL-SCNC: 135 MMOL/L (ref 136–145)
SODIUM SERPL-SCNC: 137 MMOL/L (ref 136–145)
TARGETS BLD QL SMEAR: ABNORMAL
UNIT  ABO: NORMAL
UNIT  RH: NORMAL
VARIANT LYMPHS NFR BLD MANUAL: 57 % (ref 19.6–45.3)
WBC NRBC COR # BLD: 11.4 10*3/MM3 (ref 3.4–10.8)

## 2023-10-18 PROCEDURE — 85018 HEMOGLOBIN: CPT

## 2023-10-18 PROCEDURE — 76705 ECHO EXAM OF ABDOMEN: CPT

## 2023-10-18 PROCEDURE — 86015 ACTIN ANTIBODY EACH: CPT | Performed by: NURSE PRACTITIONER

## 2023-10-18 PROCEDURE — 88305 TISSUE EXAM BY PATHOLOGIST: CPT | Performed by: INTERNAL MEDICINE

## 2023-10-18 PROCEDURE — 85014 HEMATOCRIT: CPT

## 2023-10-18 PROCEDURE — 86381 MITOCHONDRIAL ANTIBODY EACH: CPT | Performed by: NURSE PRACTITIONER

## 2023-10-18 PROCEDURE — 85025 COMPLETE CBC W/AUTO DIFF WBC: CPT

## 2023-10-18 PROCEDURE — 25810000003 SODIUM CHLORIDE 0.9 % SOLUTION: Performed by: NURSE ANESTHETIST, CERTIFIED REGISTERED

## 2023-10-18 PROCEDURE — 85610 PROTHROMBIN TIME: CPT | Performed by: NURSE PRACTITIONER

## 2023-10-18 PROCEDURE — 96375 TX/PRO/DX INJ NEW DRUG ADDON: CPT

## 2023-10-18 PROCEDURE — 97162 PT EVAL MOD COMPLEX 30 MIN: CPT

## 2023-10-18 PROCEDURE — 82728 ASSAY OF FERRITIN: CPT | Performed by: NURSE PRACTITIONER

## 2023-10-18 PROCEDURE — 82390 ASSAY OF CERULOPLASMIN: CPT | Performed by: NURSE PRACTITIONER

## 2023-10-18 PROCEDURE — 82103 ALPHA-1-ANTITRYPSIN TOTAL: CPT | Performed by: NURSE PRACTITIONER

## 2023-10-18 PROCEDURE — 80053 COMPREHEN METABOLIC PANEL: CPT

## 2023-10-18 PROCEDURE — 86038 ANTINUCLEAR ANTIBODIES: CPT | Performed by: NURSE PRACTITIONER

## 2023-10-18 PROCEDURE — 25010000002 PROPOFOL 200 MG/20ML EMULSION: Performed by: NURSE ANESTHETIST, CERTIFIED REGISTERED

## 2023-10-18 PROCEDURE — 82977 ASSAY OF GGT: CPT | Performed by: NURSE PRACTITIONER

## 2023-10-18 PROCEDURE — 25810000003 SODIUM CHLORIDE 0.9 % SOLUTION

## 2023-10-18 PROCEDURE — G0378 HOSPITAL OBSERVATION PER HR: HCPCS

## 2023-10-18 PROCEDURE — 80074 ACUTE HEPATITIS PANEL: CPT | Performed by: NURSE PRACTITIONER

## 2023-10-18 PROCEDURE — 25010000002 POTASSIUM CHLORIDE 10 MEQ/100ML SOLUTION: Performed by: INTERNAL MEDICINE

## 2023-10-18 PROCEDURE — 96376 TX/PRO/DX INJ SAME DRUG ADON: CPT

## 2023-10-18 PROCEDURE — 97166 OT EVAL MOD COMPLEX 45 MIN: CPT

## 2023-10-18 PROCEDURE — 25010000002 FUROSEMIDE PER 20 MG: Performed by: STUDENT IN AN ORGANIZED HEALTH CARE EDUCATION/TRAINING PROGRAM

## 2023-10-18 PROCEDURE — 96366 THER/PROPH/DIAG IV INF ADDON: CPT

## 2023-10-18 PROCEDURE — 85007 BL SMEAR W/DIFF WBC COUNT: CPT

## 2023-10-18 PROCEDURE — 25010000002 CEFTRIAXONE PER 250 MG: Performed by: STUDENT IN AN ORGANIZED HEALTH CARE EDUCATION/TRAINING PROGRAM

## 2023-10-18 RX ORDER — LIDOCAINE HYDROCHLORIDE 20 MG/ML
INJECTION, SOLUTION INFILTRATION; PERINEURAL AS NEEDED
Status: DISCONTINUED | OUTPATIENT
Start: 2023-10-18 | End: 2023-10-18 | Stop reason: SURG

## 2023-10-18 RX ORDER — FUROSEMIDE 10 MG/ML
40 INJECTION INTRAMUSCULAR; INTRAVENOUS DAILY
Status: COMPLETED | OUTPATIENT
Start: 2023-10-18 | End: 2023-10-19

## 2023-10-18 RX ORDER — PROPOFOL 10 MG/ML
INJECTION, EMULSION INTRAVENOUS AS NEEDED
Status: DISCONTINUED | OUTPATIENT
Start: 2023-10-18 | End: 2023-10-18 | Stop reason: SURG

## 2023-10-18 RX ORDER — SODIUM CHLORIDE 9 MG/ML
INJECTION, SOLUTION INTRAVENOUS CONTINUOUS PRN
Status: DISCONTINUED | OUTPATIENT
Start: 2023-10-18 | End: 2023-10-18 | Stop reason: SURG

## 2023-10-18 RX ADMIN — POTASSIUM CHLORIDE 10 MEQ: 7.46 INJECTION, SOLUTION INTRAVENOUS at 00:23

## 2023-10-18 RX ADMIN — POTASSIUM CHLORIDE 10 MEQ: 7.46 INJECTION, SOLUTION INTRAVENOUS at 01:43

## 2023-10-18 RX ADMIN — PANTOPRAZOLE SODIUM 40 MG: 40 INJECTION, POWDER, LYOPHILIZED, FOR SOLUTION INTRAVENOUS at 20:17

## 2023-10-18 RX ADMIN — SODIUM CHLORIDE: 9 INJECTION, SOLUTION INTRAVENOUS at 13:10

## 2023-10-18 RX ADMIN — PROPOFOL 10 MG: 10 INJECTION, EMULSION INTRAVENOUS at 13:26

## 2023-10-18 RX ADMIN — METOCLOPRAMIDE 5 MG: 5 TABLET ORAL at 17:37

## 2023-10-18 RX ADMIN — PROPOFOL 50 MG: 10 INJECTION, EMULSION INTRAVENOUS at 13:22

## 2023-10-18 RX ADMIN — LIDOCAINE HYDROCHLORIDE 100 MG: 20 INJECTION, SOLUTION INFILTRATION; PERINEURAL at 13:22

## 2023-10-18 RX ADMIN — Medication 10 ML: at 20:18

## 2023-10-18 RX ADMIN — PANTOPRAZOLE SODIUM 40 MG: 40 INJECTION, POWDER, LYOPHILIZED, FOR SOLUTION INTRAVENOUS at 10:30

## 2023-10-18 RX ADMIN — CEFTRIAXONE 2000 MG: 2 INJECTION, POWDER, FOR SOLUTION INTRAMUSCULAR; INTRAVENOUS at 10:00

## 2023-10-18 RX ADMIN — FUROSEMIDE 40 MG: 10 INJECTION, SOLUTION INTRAMUSCULAR; INTRAVENOUS at 10:47

## 2023-10-18 RX ADMIN — PROPOFOL 30 MG: 10 INJECTION, EMULSION INTRAVENOUS at 13:24

## 2023-10-18 RX ADMIN — SODIUM CHLORIDE 50 ML/HR: 9 INJECTION, SOLUTION INTRAVENOUS at 17:36

## 2023-10-18 RX ADMIN — SERTRALINE 100 MG: 100 TABLET, FILM COATED ORAL at 20:18

## 2023-10-18 RX ADMIN — ATORVASTATIN CALCIUM 40 MG: 40 TABLET, FILM COATED ORAL at 20:18

## 2023-10-18 NOTE — ANESTHESIA POSTPROCEDURE EVALUATION
Patient: Chelsea Lees    Procedure Summary       Date: 10/18/23 Room / Location: [facility] ENDOSCOPY 4 / [facility] ENDOSCOPY    Anesthesia Start: 1316 Anesthesia Stop: 1336    Procedure: ESOPHAGOGASTRODUODENOSCOPY WITH BIOPSY X1 AREA Diagnosis:       Anemia, unspecified type      Melena      (Anemia, unspecified type [D64.9])      (Melena [K92.1])    Surgeons: Rupa Renteria MD Provider: Suha Browne MD    Anesthesia Type: general ASA Status: 3            Anesthesia Type: general    Vitals  Vitals Value Taken Time   /71 10/18/23 1347   Temp     Pulse 95 10/18/23 1351   Resp     SpO2 100 % 10/18/23 1351   Vitals shown include unfiled device data.        Post Anesthesia Care and Evaluation    Patient location during evaluation: PACU  Patient participation: complete - patient participated  Level of consciousness: awake and alert  Pain management: satisfactory to patient    Airway patency: patent  Anesthetic complications: No anesthetic complications  PONV Status: none  Cardiovascular status: acceptable  Respiratory status: acceptable  Hydration status: acceptable

## 2023-10-18 NOTE — NURSING NOTE
Patient arrived back to floor from EGD. It was reported that there was no active bleed and that she had erosive gastritis.

## 2023-10-18 NOTE — PLAN OF CARE
Goal Outcome Evaluation:  Plan of Care Reviewed With: (P) patient   Patient identifies as a 63 y.o. F admitted to [facility] for increasing generalized weakness and lightheadedness in the past three weeks. PMH: Patient had previous visit to hospital for SOB and dysphagia. Patient also presents with history of CVA, HTN, anxiety, anemia, and a UTI. Patient reports being independent prior to the lat three weeks and being able to go to work, get groceries, do yard work, and be completely independent with no AD in the community. Patient lives in a bi-level home with the front door in between floors. No steps to get into the house but house steps to go up stairs and 6 steps to go down stairs. Patient reports living alone, but has family close by. This date, patient was able to perform supine to sit with supervision and CGAx1. While performing STS and in standing, patient reports feeling of dizziness and light-headedness. BP was measured in standing and was recorded at 53/34. Patient was sat back down on the bed and felt better. Patient returned to supine while in bed and BP was measured at 123/77. Patient will continue to see patient and recommends skilled nursing facility for discharge.

## 2023-10-18 NOTE — ANESTHESIA PREPROCEDURE EVALUATION
Anesthesia Evaluation     Patient summary reviewed and Nursing notes reviewed   no history of anesthetic complications:   NPO Solid Status: > 8 hours  NPO Liquid Status: > 8 hours           Airway   Mallampati: II  TM distance: >3 FB  Neck ROM: limited  Dental    (+) edentulous    Pulmonary - normal exam   (+) pulmonary embolism,  Cardiovascular - normal exam    ECG reviewed    (+) hypertension, hyperlipidemia      Neuro/Psych  (+) CVA, psychiatric history Anxiety and Depression  GI/Hepatic/Renal/Endo    (+) GERD    Musculoskeletal     Abdominal    Substance History      OB/GYN          Other      history of cancer remission                  Anesthesia Plan    ASA 3     general   total IV anesthesia  intravenous induction     Anesthetic plan, risks, benefits, and alternatives have been provided, discussed and informed consent has been obtained with: patient.    Plan discussed with CRNA and CAA.    CODE STATUS:    Level Of Support Discussed With: Patient  Code Status (Patient has no pulse and is not breathing): CPR (Attempt to Resuscitate)  Medical Interventions (Patient has pulse or is breathing): Full Support

## 2023-10-18 NOTE — OP NOTE
ESOPHAGOGASTRODUODENOSCOPY Procedure Report    Patient Name:  Chelsea Lees  YOB: 1960    Date of Surgery:  10/18/2023     Pre-Op Diagnosis:  Anemia, unspecified type [D64.9]  Melena [K92.1]         Procedure/CPT® Codes:      Procedure(s):  ESOPHAGOGASTRODUODENOSCOPY WITH BIOPSY X1 AREA    Staff:  Surgeon(s):  Rupa Renteria MD      Anesthesia: Monitored Anesthesia Care    Description of Procedure:  A description of the procedure as well as risks, benefits and alternative methods were explained to the patient who voiced understanding and signed the corresponding consent form. A physical exam was performed and vital signs were monitored throughout the procedure.    An upper GI endoscope was placed into the mouth and proceeded through the esophagus, stomach and second portion of the duodenum without difficulty. The scope was then retroflexed and the fundus was visualized. The procedure was not difficult and there were no immediate complications.    Findings  Esophageal mucosa looks normal upper middle lower part of the esophagus except for small hiatal hernia.  Changes suggestive of chronic gastritis noted in the body antrum area biopsy was performed.  Duodenal mucosa looks normal first and the second part duodenum.  No active bleeding source was noticed.      Impression:  No active bleeding source noticed in upper GI tract except erosive gastritis with significant erythema may be a possible cause for slow blood loss anemia being on anticoagulation.  Biopsy was performed from gastritis.  Small hiatal hernia.    Recommendations:  Continue with the PPI.  Follow the hemoglobin.  She had a colonoscopy May 2021 with serrated polyp low-grade dysplasia, if persistent recurrent anemia noticed, we may consider repeating colonoscopy lamination with push enteroscopy.  Okay to restart anticoagulation with recent PE.  Follow the hemoglobin.      Rupa Renteria MD     Date: 10/18/2023    Time: 13:30 EDT

## 2023-10-18 NOTE — PROGRESS NOTES
[facility]     Progress Note    Patient Name: Chelsea Lees  : 1960  MRN: [id number]  Primary Care Physician:  Camilla Camara APRN  Date of admission: 10/17/2023    Subjective   Subjective     Chief Complaint: Generalized weakness     History of Present Illness  Patient Reports feeling weak.  Black stool reported for the last 3 weeks.  Denies any cp or soa    Review of Systems  12 point ROS reviewed and negative except as mentioned above   Objective   Objective     Vitals:   Temp:  [97.7 °F (36.5 °C)-98.4 °F (36.9 °C)] 97.8 °F (36.6 °C)  Heart Rate:  [] 89  Resp:  [14-20] 18  BP: ()/(58-78) 125/60    Physical Exam   Vitals and nursing note reviewed.   HENT:      Head: Normocephalic.   Eyes:      Extraocular Movements: Extraocular movements intact.      Pupils: Pupils are equal, round, and reactive to light.   Cardiovascular:      Rate and Rhythm: Normal rate and regular rhythm.      Pulses: Normal pulses.      Heart sounds: Normal heart sounds.   Pulmonary:      Effort: Pulmonary effort is normal.      Breath sounds: Normal breath sounds.   Abdominal:      General: Bowel sounds are normal.      Palpations: Abdomen is soft.      Tenderness: There is no abdominal tenderness.   Musculoskeletal:         General: Normal range of motion.      Right lower leg: Edema present.      Left lower leg: Edema present.   Skin:     General: Skin is warm and dry.   Neurological:      Mental Status: She is alert and oriented to person, place, and time.   Psychiatric:         Mood and Affect: Mood normal.      Result Review    Result Review:  I have personally reviewed the results from the time of this admission to 10/18/2023 09:04 EDT and agree with these findings:  [x]  Laboratory list / accordion  []  Microbiology  []  Radiology  []  EKG/Telemetry   []  Cardiology/Vascular   []  Pathology  []  Old records  []  Other:        Assessment & Plan   Assessment / Plan     Brief Patient Summary:  Chelsea DENG  Nabeel is a 63 y.o. female     Active Hospital Problems:  Active Hospital Problems    Diagnosis     **Weakness generalized     Pulmonary emboli     Acute UTI (urinary tract infection)     GERD (gastroesophageal reflux disease)      Plan:   Generalized weakness  lightheadedness  Melena  Shortness of breath  Recent history of PE  Anemia  -Weakness, dizziness, shortness of breath likely related to anemia which may be due to blood loss  -Chest x-ray radiology report states no acute process identified  -Patient currently stable on room air  -Hemoglobin 7.6  -1 unit PRBC ordered by ED provider as patient is symptomatic  -Continue to monitor hemoglobin and hematocrit every 6 hours  -Transfuse for hemoglobin less than 7  -IV Protonix ordered  -Clear liquid diet ordered for now, NPO after midnight  -Gentle IV fluid hydration ordered  -Hold home Eliquis and Plavix due to possible GI bleed  -GI consulted  -PT OT consulted for further evaluation of generalized weakness     Acute urinary tract infection  -WBC 11.8  -Patient is afebrile  -Blood cultures pending, continue to follow  -Patient denies any increased frequency or dysuria  -Urinalysis shows positive nitrite, 1+ leukocytes, 1+ protein, 2+ bilirubin, 3+ bacteria, cloudy appearance, dark yellow color  -Ceftriaxone initiated in the ED, continue     Hypokalemia  -Potassium replacement protocol ordered  -Continue to monitor labs     Bilateral lower extremity swelling  -Patient states she has not been taking full dose of Lasix because she thought it was contributing to her weakness  -Echo from 9/4/2023 showed left ejection fraction to be 56 to 60%  -Continue home dose of Lasix  -Continue to monitor     Anxiety and depression  -Continue home Zoloft     Hyperlipidemia  -Continue home Lipitor     GERD   -Hold home protonix while on IV protonix    DVT prophylaxis:  Mechanical DVT prophylaxis orders are present.    Started on IV Lasix 40 mg daily.  GI consulted and following.   Patient n.p.o.  Eliquis/Plavix on hold.  Labs reviewed.  Hemoglobin 9.7.  Continue monitoring H&H.  Repeat labs and monitor.    CODE STATUS:    Level Of Support Discussed With: Patient  Code Status (Patient has no pulse and is not breathing): CPR (Attempt to Resuscitate)  Medical Interventions (Patient has pulse or is breathing): Full Support    Disposition:  I expect patient to be discharged in 2 days.    Domingo Kurtz MD

## 2023-10-18 NOTE — CONSULTS
GI CONSULT  NOTE:    Referring Provider:  MIGUEL Mohan    Chief complaint: Melena, anemia    Subjective .     History of present illness: Chelsea Lees is a 63 y.o. female with history of CVA, Hodgkin's lymphoma in remission, bilateral PE on Eliquis, hypertension, hyperlipidemia, and splenectomy who presents with complaints of weakness.  The patient was recently discharged on 9/5/2023 for an admission for complaints of shortness of breath.  At that time, she was diagnosed with bilateral PEs and was started on Eliquis.  She states that she began to notice melena shortly after her discharge.  States that she saw melena for approximately 3 weeks, but this has now resolved.  Denies bright red blood per rectum.  States that she typically moves her bowels regularly.  Denies any abdominal pain.  Does complain of early satiety.  Denies nausea/vomiting, heartburn, or dysphagia.  States that she has been taking pantoprazole daily.  Denies any NSAID use.  No recent fever.  Denies abdominal pain.  Does report some unintentional weight loss recently.      Endo History:  5/2021 colonoscopy (Dr. Medrano) -polyps (SSA with low-grade dysplasia)  No previous EGD.    Past Medical History:  Past Medical History:   Diagnosis Date    Cancer 09/19/1985    Stroke 12/14/2018       Past Surgical History:  Past Surgical History:   Procedure Laterality Date    LYMPH NODE DISSECTION  1985    abdomen and neck    SPLENECTOMY  1985       Social History:  Social History     Tobacco Use    Smoking status: Never    Smokeless tobacco: Never   Vaping Use    Vaping Use: Never used   Substance Use Topics    Alcohol use: Never    Drug use: Never       Family History:  Family History   Problem Relation Age of Onset    Heart attack Father     Cancer Brother        Medications:  Medications Prior to Admission   Medication Sig Dispense Refill Last Dose    acetaminophen (TYLENOL) 650 MG 8 hr tablet Take 2 tablets by mouth Every 8 (Eight) Hours As Needed  for Mild Pain.   10/16/2023    apixaban (ELIQUIS) 5 MG tablet tablet Take 1 tablet by mouth 2 (Two) Times a Day. Indications: DVT/PE (active thrombosis) 60 tablet 0 10/16/2023    atorvastatin (LIPITOR) 40 MG tablet Take 1 tablet by mouth Every Night.   10/16/2023    cetirizine (zyrTEC) 10 MG tablet Take 1 tablet by mouth Daily.   10/16/2023    clopidogrel (PLAVIX) 75 MG tablet Take 1 tablet by mouth Daily.   10/16/2023    furosemide (LASIX) 20 MG tablet Take 1 tablet by mouth Daily.   Past Month    metoclopramide (REGLAN) 5 MG tablet Take 1 tablet by mouth 3 (Three) Times a Day Before Meals. 20 tablet 0 Past Month    pantoprazole (PROTONIX) 40 MG EC tablet Take 1 tablet by mouth Every Morning. 30 tablet 0 10/16/2023    sertraline (ZOLOFT) 100 MG tablet Take 1 tablet by mouth Every Night.   10/16/2023       Scheduled Meds:atorvastatin, 40 mg, Oral, Nightly  cefTRIAXone, 2,000 mg, Intravenous, Q24H  cetirizine, 10 mg, Oral, Daily  furosemide, 20 mg, Oral, Daily  metoclopramide, 5 mg, Oral, TID AC  pantoprazole, 40 mg, Intravenous, Q12H  sertraline, 100 mg, Oral, Nightly      Continuous Infusions:sodium chloride, 50 mL/hr, Last Rate: 50 mL/hr (10/17/23 2032)      PRN Meds:.  acetaminophen    influenza vaccine    nitroglycerin    Potassium Replacement - Follow Nurse / BPA Driven Protocol    [COMPLETED] Insert Peripheral IV **AND** sodium chloride    ALLERGIES:  Patient has no known allergies.    ROS:  The following systems were reviewed;   Constitution:  No fevers, no chills, unintentional weight loss  Skin: no rash, no jaundice  Eyes:  No blurry vision, no eye pain  HENT:  No change in hearing or smell  Resp:  No dyspnea or cough  CV:  No chest pain or palpitations  :  No dysuria, hematuria  Musculoskeletal:  No leg cramps or arthralgias  Neuro:  No tremor, no numbness  Psych:  No depression or confusion    Objective     Vital Signs:   Vitals:    10/17/23 2300 10/18/23 0022 10/18/23 0414 10/18/23 0829   BP:  114/72  "113/62 125/60   BP Location:  Right arm Right arm Right arm   Patient Position:  Lying Lying Lying   Pulse:   89    Resp:  14 20 18   Temp:  98 °F (36.7 °C) 97.7 °F (36.5 °C) 97.8 °F (36.6 °C)   TempSrc: Oral Oral Oral Oral   SpO2:   98%    Weight:       Height:           Physical Exam:       General Appearance:    Awake and alert, in no acute distress   Head:    Normocephalic, without obvious abnormality, atraumatic   Throat:   No oral lesions, no thrush, oral mucosa moist   Lungs:     Respirations regular, even and unlabored   Chest Wall:    No abnormalities observed   Abdomen:     Soft, non-tender, no rebound or guarding, non-distended   Rectal:     Deferred   Extremities:   Moves all extremities, no edema, no cyanosis   Pulses:   Pulses palpable and equal bilaterally   Skin:   No rash, no jaundice, normal palpation   Lymph nodes:   No cervical, supraclavicular or submandibular palpable adenopathy   Neurologic:   Cranial nerves 2 - 12 grossly intact, no asterixis       Results Review:   I reviewed the patient's labs and imaging.  CBC    Results from last 7 days   Lab Units 10/18/23  0607 10/18/23  0053 10/17/23  1326 10/13/23  1231   WBC 10*3/mm3 11.40*  --  11.80* 14.74*   HEMOGLOBIN g/dL 9.1* 9.5* 7.6* 8.8*   PLATELETS 10*3/mm3 477*  --  550* 597*     CMP   Results from last 7 days   Lab Units 10/18/23  0607 10/17/23  1326   SODIUM mmol/L 135* 134*   POTASSIUM mmol/L 4.2 3.3*   CHLORIDE mmol/L 104 100   CO2 mmol/L 25.0 23.0   BUN mg/dL 26* 31*   CREATININE mg/dL 0.76 1.00   GLUCOSE mg/dL 91 152*   ALBUMIN g/dL  --  1.6*   BILIRUBIN mg/dL  --  0.3   ALK PHOS U/L  --  144*   AST (SGOT) U/L  --  45*   ALT (SGPT) U/L  --  55*     Cr Clearance Estimated Creatinine Clearance: 80.8 mL/min (by C-G formula based on SCr of 0.76 mg/dL).  Coag     HbA1C No results found for: \"HGBA1C\"  Blood Glucose No results found for: \"POCGLU\"  Infection     UA    Results from last 7 days   Lab Units 10/17/23  1158   NITRITE UA  " Positive*   WBC UA /HPF 0-2   BACTERIA UA /HPF 3+*   SQUAM EPITHEL UA /HPF None Seen     Radiology(recent) XR Chest 1 View    Result Date: 10/17/2023  Impression: No acute process identified. Electronically Signed: Jama Steward MD  10/17/2023 11:51 AM CDT  Workstation ID: [id number]        ASSESSMENT:  -Melena  -Acute blood loss anemia  -Early satiety  -Unintentional weight loss  -Elevated LFTs  -Leukocytosis  -UTI  -Bilateral PE -on Eliquis  -History of CVA  -History of Hodgkin's lymphoma in remission  -Hypertension  -Hyperlipidemia  -History of splenectomy    PLAN:  Patient is a 63-year-old female with history of newly diagnosed bilateral PE on Eliquis, splenectomy, and Hodgkin's lymphoma in remission who presented on 10/17 with complaints of weakness.  She was found to have anemia with hemoglobin of 7.6 on admission.  GI has been asked to consult for further evaluation.    Patient has received 1 unit PRBCs and hemoglobin is currently 9.1.  Continue to monitor H/H and transfuse as needed.  Review of records shows hemoglobin of 12 in 9/2023.  We will plan EGD today for further evaluation.  Maintain NPO.  Continue PPI twice daily.  Agree with Reglan.  Eliquis is on hold.  Mild elevation in LFTs noted.  Total bilirubin 0.3, alk phos 144, AST 45, ALT 55.  LFTs were normal during her admission in 9/2023.  We will send full liver serologies and plan right upper quadrant ultrasound.  Treatment of UTI per primary care team.  Supportive care.      I discussed the patients findings and my recommendations with the patient.  I will discuss case with Dr. Renteria and change plan accordingly.    We appreciate the referral.    Electronically signed by BREE Robison, 10/18/23, 9:51 AM EDT.

## 2023-10-18 NOTE — PLAN OF CARE
Goal Outcome Evaluation:  Plan of Care Reviewed With: patient        Progress: no change  Outcome Evaluation: Pt is a 64 y/o F admitted to [facility] 10/17/23 with c/o generalized weakness. Pt recently admitted 9/3-9/5/23 with PE, SOB, and dysphagia. Pt has since noticed black stool since dc. Pt noted to have 30 pound weight loss x1 month. CXR (-) acute. US liver (+) hepatic cyst. Pt s/p EGD 10/18/23 with no active bleeding, (+) erosive gastritis. PMHx significant for hx CVA, hodgkin's disease in remission, dysphagia, and pulmonary nodule. At baseline pt resides alone in multilevel home with 12 stairs inside, 0 MELANIE. Pt typically independent with ADLs, no AD, drives and works full time. This date, pt A&Ox4, on RA, in supine and with family at bedside. Pt requires SBA for supine to sit, min A for sit to supine secondary to BLE edema. Pt requires max A to doff socks d/t limited trunk flexion and orthostatic hypotension this date. Pt orthostatic with supine to sit, therefore further activity was deferred, asymptmatic. Pt is limited to further activity, however appears to be below funcitonal baseline. OT recommend SNF when medically appropriate for d/c progress pending. OT will follow while admitted.      Anticipated Discharge Disposition (OT): skilled nursing facility

## 2023-10-18 NOTE — CASE MANAGEMENT/SOCIAL WORK
Discharge Planning Assessment   [facility]     Patient Name: Chelsea Lees  MRN: [id number]  Today's Date: 10/18/2023    Admit Date: 10/17/2023    Plan: Home at discharge.   Discharge Needs Assessment       Row Name 10/18/23 1518       Living Environment    People in Home alone    Current Living Arrangements home    Potentially Unsafe Housing Conditions none    Primary Care Provided by self    Provides Primary Care For no one    Family Caregiver if Needed sibling(s)    Quality of Family Relationships helpful;involved;supportive    Able to Return to Prior Arrangements yes       Resource/Environmental Concerns    Resource/Environmental Concerns none    Transportation Concerns none       Transition Planning    Patient/Family Anticipates Transition to home    Patient/Family Anticipated Services at Transition none    Transportation Anticipated family or friend will provide       Discharge Needs Assessment    Readmission Within the Last 30 Days no previous admission in last 30 days    Equipment Currently Used at Home cane, straight;bath bench;shower chair    Concerns to be Addressed discharge planning    Anticipated Changes Related to Illness none    Equipment Needed After Discharge none                   Discharge Plan       Row Name 10/18/23 1519       Plan    Plan Home at discharge.    Patient/Family in Agreement with Plan yes    Plan Comments Patient lives at home alone. Patient drives, sister will transport at discharge. Patient performs ADL. PCP and pharmacy confirmed. Denies financial assistance needs for medication and/or food. Denies HH and/or rehab needs. D/C barriers: EGD, elevated BUN, pending blood cultures, IV abx, GI following.                  Demographic Summary       Row Name 10/18/23 1518       General Information    Admission Type observation    Arrived From emergency department    Referral Source admission list    Reason for Consult discharge planning    Preferred Language English                    Functional Status       Row Name 10/18/23 1518       Functional Status    Usual Activity Tolerance good    Current Activity Tolerance good       Functional Status, IADL    Medications independent    Meal Preparation independent    Housekeeping independent    Laundry independent    Shopping independent             Neda Reyes, RN, BSN    [address] [address]  Phone: [phone]  Fax: [phone]

## 2023-10-18 NOTE — THERAPY EVALUATION
Garrett from Yale New Haven Garrett a call.    Patient Name: Chelsea Lees  : 1960    MRN: [id number]                              Today's Date: 10/18/2023       Admit Date: 10/17/2023    Visit Dx:     ICD-10-CM ICD-9-CM   1. Anemia, unspecified type  D64.9 285.9   2. UTI (urinary tract infection), bacterial  N39.0 599.0    A49.9 041.9   3. Melena  K92.1 578.1     Patient Active Problem List   Diagnosis    History of CVA in adulthood    Hodgkin's disease in remission    HTN (hypertension)    Dysphagia    GERD (gastroesophageal reflux disease)    HLD (hyperlipidemia)    Anxiety associated with depression    Pulmonary emboli    Pulmonary embolus    Colitis    Acute UTI (urinary tract infection)    Pulmonary nodule 1 cm or greater in diameter    Adrenal nodule    Weakness generalized    Anemia    Melena     Past Medical History:   Diagnosis Date    Cancer 1985    Stroke 2018     Past Surgical History:   Procedure Laterality Date    LYMPH NODE DISSECTION      abdomen and neck    SPLENECTOMY        General Information       Row Name 10/18/23 1131          Physical Therapy Time and Intention    Document Type evaluation (P)   -CS     Mode of Treatment physical therapy (P)   -CS       Row Name 10/18/23 1131          General Information    Patient Profile Reviewed yes (P)   -CS     Prior Level of Function independent:;all household mobility;community mobility;gait;transfer;driving;cooking;cleaning;shopping (P)   -CS     Existing Precautions/Restrictions NPO (P)   -CS       Row Name 10/18/23 1131          Living Environment    People in Home alone (P)   -CS       Row Name 10/18/23 1131          Home Main Entrance    Number of Stairs, Main Entrance none (P)   -CS       Row Name 10/18/23 1131          Stairs Within Home, Primary    Number of Stairs, Within Home, Primary twelve (P)   Bi-Level Home, Front Door in the Middle, 6 stairs up, 6 stairs down  -CS       Row Name 10/18/23 1131          Cognition    Orientation Status (Cognition) oriented  x 4 (P)   -CS       Row Name 10/18/23 1131          Safety Issues, Functional Mobility    Impairments Affecting Function (Mobility) balance;endurance/activity tolerance;range of motion (ROM);sensation/sensory awareness;strength;other (see comments) (P)   Light-headedness while standing  -CS               User Key  (r) = Recorded By, (t) = Taken By, (c) = Cosigned By      Initials Name Provider Type    Alexx Heredia PT Student PT Student                   Mobility       Row Name 10/18/23 1134          Bed Mobility    Bed Mobility bed mobility (all) activities;supine-sit;sit-supine (P)   -CS     All Activities, [place] (Bed Mobility) supervision;contact guard (P)   -CS     Supine-Sit [place] (Bed Mobility) supervision;contact guard (P)   -CS     Sit-Supine [place] (Bed Mobility) minimum assist (75% patient effort) (P)   Needed assistance getting centered in bed  -CS     Assistive Device (Bed Mobility) bed rails (P)   -CS       Row Name 10/18/23 1134          Bed-Chair Transfer    Bed-Chair [place] (Transfers) unable to assess (P)   -CS     Assistive Device (Bed-Chair Transfers) walker, front-wheeled (P)   -CS       Row Name 10/18/23 1134          Sit-Stand Transfer    Sit-Stand [place] (Transfers) supervision;contact guard (P)   -CS     Assistive Device (Sit-Stand Transfers) walker, front-wheeled (P)   -CS       Row Name 10/18/23 1134          Gait/Stairs (Locomotion)    [place] Level (Gait) unable to assess (P)   -CS     Patient was able to Ambulate no, other medical factors prevent ambulation (P)   -CS     Reason Patient was unable to Ambulate Dizziness (P)   -CS     Distance in Feet (Gait) 0 (P)   -CS               User Key  (r) = Recorded By, (t) = Taken By, (c) = Cosigned By      Initials Name Provider Type    Alexx Heredia PT Student PT Student                   Obj/Interventions       Row Name 10/18/23 1138          Range of Motion Comprehensive    General Range of Motion lower  extremity range of motion deficits identified (P)   -CS     Comment, General Range of Motion Lack of hip flexion Bilaterally (P)   -CS       Row Name 10/18/23 1138          Strength Comprehensive (MMT)    General Manual Muscle Testing (MMT) Assessment no strength deficits identified (P)   -CS     Comment, General Manual Muscle Testing (MMT) Assessment BLE grossly 3/5 (P)   -CS       Row Name 10/18/23 1138          Balance    Balance Assessment sitting static balance;sitting dynamic balance;standing static balance (P)   -CS     Static Sitting Balance modified independence;supervision (P)   -CS     Dynamic Sitting Balance modified independence;contact guard (P)   -CS     Position, Sitting Balance unsupported (P)   -CS     Static Standing Balance contact guard (P)   -CS     Position/Device Used, Standing Balance unsupported (P)   -CS       Row Name 10/18/23 1138          Sensory Assessment (Somatosensory)    Sensory Assessment (Somatosensory) sensation intact (P)   -CS               User Key  (r) = Recorded By, (t) = Taken By, (c) = Cosigned By      Initials Name Provider Type    CS Stump, Alexx, PT Student PT Student                   Goals/Plan       Row Name 10/18/23 1203          Bed Mobility Goal 1 (PT)    Activity/Assistive Device (Bed Mobility Goal 1, PT) bed mobility activities, all (P)   -CS     [place] Level/Cues Needed (Bed Mobility Goal 1, PT) modified independence (P)   -CS     Time Frame (Bed Mobility Goal 1, PT) long term goal (LTG);2 weeks (P)   -CS       Row Name 10/18/23 1203          Transfer Goal 1 (PT)    Activity/Assistive Device (Transfer Goal 1, PT) sit-to-stand/stand-to-sit;walker, rolling (P)   -CS     [place] Level/Cues Needed (Transfer Goal 1, PT) contact guard required;supervision required (P)   -CS     Time Frame (Transfer Goal 1, PT) long term goal (LTG);2 weeks (P)   -CS       Row Name 10/18/23 1203          Gait Training Goal 1 (PT)    Activity/Assistive Device (Gait Training  Goal 1, PT) gait (walking locomotion);walker, rolling (P)   -CS     [place] Level (Gait Training Goal 1, PT) supervision required;contact guard required (P)   -CS     Distance (Gait Training Goal 1, PT) 35 (P)   -CS       Row Name 10/18/23 1203          Therapy Assessment/Plan (PT)    Planned Therapy Interventions (PT) balance training;gait training;neuromuscular re-education;patient/family education;ROM (range of motion);strengthening;transfer training (P)   -CS               User Key  (r) = Recorded By, (t) = Taken By, (c) = Cosigned By      Initials Name Provider Type    CS Alexx Beltran, PT Student PT Student                   Clinical Impression       Row Name 10/18/23 1142 10/18/23 1140       Pain    Pretreatment Pain Rating 0/10 - no pain (P)   -CS 0/10 - no pain (P)   -CS    Posttreatment Pain Rating 0/10 - no pain (P)   -CS 0/10 - no pain (P)   -CS      Row Name 10/18/23 1142 10/18/23 1140       Plan of Care Review    Plan of Care Reviewed With patient (P)   -CS patient (P)   -CS    Outcome Evaluation Patient identifies as a 63 y.o. F admitted to [facility] for increasing generalized weakness and lightheadedness in the past three weeks. PMH: Patient had previous visit to hospital for SOB and dysphagia. Patient also presents with history of CVA, HTN, anxiety, anemia, and a UTI. Patient reports being independent prior to the lat three weeks and being able to go to work, get groceries, do yard work, and be completely independent with no AD in the community. Patient lives in a bi-level home with the front door in between floors. No steps to get into the house but house steps to go up stairs and 6 steps to go down stairs. Patient reports living alone, but has family close by. This date, patient was able to perform supine to sit with supervision and CGAx1. While performing STS and in standing, patient reports feeling of dizziness and light-headedness. BP was measured in standing and was recorded at 53/34. Patient was  sat back down on the bed and felt better. Patient returned to supine while in bed and BP was measured at 123/77. Patient will continue to see patient and recommends skilled nursing facility for discharge. (P)   -CS --      Row Name 10/18/23 1142 10/18/23 1140       Therapy Assessment/Plan (PT)    Rehab Potential (PT) good, to achieve stated therapy goals (P)   -CS good, to achieve stated therapy goals (P)   -CS    Criteria for Skilled Interventions Met (PT) yes (P)   -CS yes;meets criteria;skilled treatment is necessary (P)   -CS    Therapy Frequency (PT) 5 times/wk (P)   -CS 5 times/wk (P)   -CS    Predicted Duration of Therapy Intervention (PT) -- Until D/C (P)   -CS      Row Name 10/18/23 1142 10/18/23 1140       Vital Signs    Pre Systolic BP Rehab 114 (P)   - (P)   -CS    Pre Treatment Diastolic BP 53 (P)   -CS 53 (P)   -CS    Intra Systolic BP Rehab 53 (P)   While Standing, Sitting shortly After  -CS 53 (P)   While Standing, shortly sitting afterwards  -CS    Intra Treatment Diastolic BP 34 (P)   -CS 34 (P)   -CS    Post Systolic BP Rehab 123 (P)   While lying Supine in Bed  - (P)   -CS    Post Treatment Diastolic BP 77 (P)   -CS 77 (P)   -CS    Pretreatment Heart Rate (beats/min) 96 (P)   -CS 96 (P)   -CS    Intratreatment Heart Rate (beats/min) 123 (P)   -CS --    Posttreatment Heart Rate (beats/min) 107 (P)   - (P)   -CS    Pre SpO2 (%) 96 (P)   -CS 96 (P)   -CS    O2 Delivery Pre Treatment room air (P)   -CS room air (P)   -CS    Post SpO2 (%) 96 (P)   -CS 96 (P)   -CS    O2 Delivery Post Treatment room air (P)   -CS room air (P)   -CS    Pre Patient Position Supine (P)   -CS Supine (P)   -CS    Intra Patient Position Standing (P)   -CS Standing (P)   -CS    Post Patient Position Supine (P)   -CS Supine (P)   -CS      Row Name 10/18/23 1142 10/18/23 1140       Positioning and Restraints    Pre-Treatment Position in bed (P)   -CS in bed (P)   -CS    Post Treatment Position bed (P)   -CS  bed (P)   -CS    In Bed notified nsg;supine;call light within reach;encouraged to call for assist (P)   -CS notified nsg;supine;call light within reach;encouraged to call for assist (P)   -CS              User Key  (r) = Recorded By, (t) = Taken By, (c) = Cosigned By      Initials Name Provider Type    Alexx Heredia, PT Student PT Student                   Outcome Measures       Row Name 10/18/23 1205 10/18/23 0014       How much help from another person do you currently need...    Turning from your back to your side while in flat bed without using bedrails? 4 (P)   -CS 4  -DB    Moving from lying on back to sitting on the side of a flat bed without bedrails? 4 (P)   -CS 4  -DB    Moving to and from a bed to a chair (including a wheelchair)? 2 (P)   -CS 3  -DB    Standing up from a chair using your arms (e.g., wheelchair, bedside chair)? 3 (P)   -CS 3  -DB    Climbing 3-5 steps with a railing? 2 (P)   -CS 2  -DB    To walk in hospital room? 2 (P)   -CS 2  -DB    AM-PAC 6 Clicks Score (PT) 17 (P)   -CS 18  -DB    Highest level of mobility 5 --> Static standing (P)   -CS 6 --> Walked 10 steps or more  -DB      Row Name 10/18/23 1205          Functional Assessment    Outcome Measure Options AM-PAC 6 Clicks Basic Mobility (PT) (P)   -CS               User Key  (r) = Recorded By, (t) = Taken By, (c) = Cosigned By      Initials Name Provider Type    Yesica Corey, RN Registered Nurse    Alexx Heredia, PT Student PT Student                                 Physical Therapy Education       Title: PT OT SLP Therapies (Done)       Topic: Physical Therapy (Done)       Point: Mobility training (Done)       Learning Progress Summary             Patient Acceptance, E,D, VU,DU by  at 10/18/2023 1206                         Point: Body mechanics (Done)       Learning Progress Summary             Patient Acceptance, E,D, VU,DU by  at 10/18/2023 1206                         Point: Precautions (Done)       Learning Progress  Summary             Patient Acceptance, E,D, VU,DU by  at 10/18/2023 1206                                         User Key       Initials Effective Dates Name Provider Type Discipline     09/26/23 -  Alexx Beltran, PT Student PT Student PT                  PT Recommendation and Plan  Planned Therapy Interventions (PT): (P) balance training, gait training, neuromuscular re-education, patient/family education, ROM (range of motion), strengthening, transfer training  Plan of Care Reviewed With: (P) patient  Outcome Evaluation: (P) Patient identifies as a 63 y.o. F admitted to [facility] for increasing generalized weakness and lightheadedness in the past three weeks. PMH: Patient had previous visit to hospital for SOB and dysphagia. Patient also presents with history of CVA, HTN, anxiety, anemia, and a UTI. Patient reports being independent prior to the lat three weeks and being able to go to work, get groceries, do yard work, and be completely independent with no AD in the community. Patient lives in a bi-level home with the front door in between floors. No steps to get into the house but house steps to go up stairs and 6 steps to go down stairs. Patient reports living alone, but has family close by. This date, patient was able to perform supine to sit with supervision and CGAx1. While performing STS and in standing, patient reports feeling of dizziness and light-headedness. BP was measured in standing and was recorded at 53/34. Patient was sat back down on the bed and felt better. Patient returned to supine while in bed and BP was measured at 123/77. Patient will continue to see patient and recommends skilled nursing facility for discharge.     Time Calculation:         PT Charges       Row Name 10/18/23 1206             Time Calculation    Start Time 1036 (P)   -      Stop Time 1111 (P)   -      Time Calculation (min) 35 min (P)   -      PT Received On 10/18/23 (P)   -      PT - Next Appointment 10/19/23 (P)   -       PT Goal Re-Cert Due Date 11/01/23 (P)   -CS         Time Calculation- PT    Total Timed Code Minutes- PT 0 minute(s) (P)   -CS                User Key  (r) = Recorded By, (t) = Taken By, (c) = Cosigned By      Initials Name Provider Type    CS Alexx Beltran, PT Student PT Student                  Therapy Charges for Today       Code Description Service Date Service Provider Modifiers Qty    [id number] HC PT EVAL MOD COMPLEXITY 4 10/18/2023 Alexx Beltran, PT Student GP 1            PT G-Codes  Outcome Measure Options: (P) AM-PAC 6 Clicks Basic Mobility (PT)  AM-PAC 6 Clicks Score (PT): (P) 17       Alexx Beltran PT Student  10/18/2023

## 2023-10-19 LAB
DEPRECATED RDW RBC AUTO: 85.8 FL (ref 37–54)
EOSINOPHIL # BLD MANUAL: 0.11 10*3/MM3 (ref 0–0.4)
EOSINOPHIL NFR BLD MANUAL: 1 % (ref 0.3–6.2)
ERYTHROCYTE [DISTWIDTH] IN BLOOD BY AUTOMATED COUNT: 25.3 % (ref 12.3–15.4)
HCT VFR BLD AUTO: 26.7 % (ref 34–46.6)
HCT VFR BLD AUTO: 26.8 % (ref 34–46.6)
HCT VFR BLD AUTO: 28.5 % (ref 34–46.6)
HCT VFR BLD AUTO: 31 % (ref 34–46.6)
HGB BLD-MCNC: 10.4 G/DL (ref 12–15.9)
HGB BLD-MCNC: 8.6 G/DL (ref 12–15.9)
HGB BLD-MCNC: 8.7 G/DL (ref 12–15.9)
HGB BLD-MCNC: 9.5 G/DL (ref 12–15.9)
HOLD SPECIMEN: NORMAL
LAB AP CASE REPORT: NORMAL
LYMPHOCYTES # BLD MANUAL: 5.38 10*3/MM3 (ref 0.7–3.1)
LYMPHOCYTES NFR BLD MANUAL: 6 % (ref 5–12)
MCH RBC QN AUTO: 31.9 PG (ref 26.6–33)
MCHC RBC AUTO-ENTMCNC: 32.6 G/DL (ref 31.5–35.7)
MCV RBC AUTO: 97.7 FL (ref 79–97)
MITOCHONDRIA M2 IGG SER-ACNC: <20 UNITS (ref 0–20)
MONOCYTES # BLD: 0.67 10*3/MM3 (ref 0.1–0.9)
NEUTROPHILS # BLD AUTO: 5.04 10*3/MM3 (ref 1.7–7)
NEUTROPHILS NFR BLD MANUAL: 45 % (ref 42.7–76)
NRBC SPEC MANUAL: 1 /100 WBC (ref 0–0.2)
PATH REPORT.FINAL DX SPEC: NORMAL
PLATELET # BLD AUTO: 455 10*3/MM3 (ref 140–450)
PMV BLD AUTO: 8.5 FL (ref 6–12)
RBC # BLD AUTO: 2.73 10*6/MM3 (ref 3.77–5.28)
RBC MORPH BLD: NORMAL
SCAN SLIDE: NORMAL
SMA IGG SER-ACNC: 6 UNITS (ref 0–19)
SMALL PLATELETS BLD QL SMEAR: ABNORMAL
VARIANT LYMPHS NFR BLD MANUAL: 48 % (ref 19.6–45.3)
WBC MORPH BLD: NORMAL
WBC NRBC COR # BLD: 11.2 10*3/MM3 (ref 3.4–10.8)

## 2023-10-19 PROCEDURE — G0378 HOSPITAL OBSERVATION PER HR: HCPCS

## 2023-10-19 PROCEDURE — 97116 GAIT TRAINING THERAPY: CPT

## 2023-10-19 PROCEDURE — 97112 NEUROMUSCULAR REEDUCATION: CPT

## 2023-10-19 PROCEDURE — 85014 HEMATOCRIT: CPT | Performed by: STUDENT IN AN ORGANIZED HEALTH CARE EDUCATION/TRAINING PROGRAM

## 2023-10-19 PROCEDURE — 85025 COMPLETE CBC W/AUTO DIFF WBC: CPT | Performed by: STUDENT IN AN ORGANIZED HEALTH CARE EDUCATION/TRAINING PROGRAM

## 2023-10-19 PROCEDURE — 85007 BL SMEAR W/DIFF WBC COUNT: CPT | Performed by: STUDENT IN AN ORGANIZED HEALTH CARE EDUCATION/TRAINING PROGRAM

## 2023-10-19 PROCEDURE — 85018 HEMOGLOBIN: CPT | Performed by: STUDENT IN AN ORGANIZED HEALTH CARE EDUCATION/TRAINING PROGRAM

## 2023-10-19 PROCEDURE — 25010000002 FUROSEMIDE PER 20 MG: Performed by: STUDENT IN AN ORGANIZED HEALTH CARE EDUCATION/TRAINING PROGRAM

## 2023-10-19 PROCEDURE — 25010000002 CEFTRIAXONE PER 250 MG: Performed by: STUDENT IN AN ORGANIZED HEALTH CARE EDUCATION/TRAINING PROGRAM

## 2023-10-19 PROCEDURE — C1751 CATH, INF, PER/CENT/MIDLINE: HCPCS

## 2023-10-19 RX ORDER — CLOPIDOGREL BISULFATE 75 MG/1
75 TABLET ORAL DAILY
Status: DISCONTINUED | OUTPATIENT
Start: 2023-10-19 | End: 2023-10-20 | Stop reason: HOSPADM

## 2023-10-19 RX ORDER — FERROUS SULFATE 324(65)MG
324 TABLET, DELAYED RELEASE (ENTERIC COATED) ORAL
Status: DISCONTINUED | OUTPATIENT
Start: 2023-10-19 | End: 2023-10-20 | Stop reason: HOSPADM

## 2023-10-19 RX ADMIN — METOCLOPRAMIDE 5 MG: 5 TABLET ORAL at 16:30

## 2023-10-19 RX ADMIN — SERTRALINE 100 MG: 100 TABLET, FILM COATED ORAL at 20:38

## 2023-10-19 RX ADMIN — FUROSEMIDE 40 MG: 10 INJECTION, SOLUTION INTRAMUSCULAR; INTRAVENOUS at 12:53

## 2023-10-19 RX ADMIN — CEFTRIAXONE 2000 MG: 2 INJECTION, POWDER, FOR SOLUTION INTRAMUSCULAR; INTRAVENOUS at 12:48

## 2023-10-19 RX ADMIN — PANTOPRAZOLE SODIUM 40 MG: 40 INJECTION, POWDER, LYOPHILIZED, FOR SOLUTION INTRAVENOUS at 12:53

## 2023-10-19 RX ADMIN — APIXABAN 5 MG: 5 TABLET, FILM COATED ORAL at 20:38

## 2023-10-19 RX ADMIN — METOCLOPRAMIDE 5 MG: 5 TABLET ORAL at 12:54

## 2023-10-19 RX ADMIN — METOCLOPRAMIDE 5 MG: 5 TABLET ORAL at 08:48

## 2023-10-19 RX ADMIN — CETIRIZINE HYDROCHLORIDE 10 MG: 10 TABLET, FILM COATED ORAL at 08:47

## 2023-10-19 RX ADMIN — FERROUS SULFATE TAB EC 324 MG (65 MG FE EQUIVALENT) 324 MG: 324 (65 FE) TABLET DELAYED RESPONSE at 12:54

## 2023-10-19 RX ADMIN — CLOPIDOGREL BISULFATE 75 MG: 75 TABLET ORAL at 16:00

## 2023-10-19 RX ADMIN — PANTOPRAZOLE SODIUM 40 MG: 40 INJECTION, POWDER, LYOPHILIZED, FOR SOLUTION INTRAVENOUS at 20:38

## 2023-10-19 RX ADMIN — ATORVASTATIN CALCIUM 40 MG: 40 TABLET, FILM COATED ORAL at 20:38

## 2023-10-19 NOTE — DISCHARGE PLACEMENT REQUEST
"Chelsea Liu (63 y.o. Female)       Date of Birth   1960    Social Security Number       Address   [address] [place] [address] [place] [facility]    Home Phone   [phone]    MRN   [id number]       [facility]    Marital Status                               Admission Date   10/17/23    Admission Type   Emergency    Admitting Provider   Megan Julian MD    Attending Provider   Richard Mathews MD    Department, Room/Bed   [facility] 3C MEDICAL INPATIENT, 377/       Discharge Date       Discharge Disposition       Discharge Destination                                 Attending Provider: Richard Mathews MD    Allergies: No Known Allergies    Isolation: None   Infection: None   Code Status: CPR    Ht: 170.2 cm (67\")   Wt: 84.1 kg (185 lb 6.5 oz)    Admission Cmt: None   Principal Problem: Weakness generalized [R53.1]                   Active Insurance as of 10/17/2023       Primary Coverage       Payor Plan Insurance Group Employer/Plan Group    [employer]HE[employer] BLUE CROSS AN[employer]M BLUE CROSS BLUE SHIELD PPO [id number]       Payor Plan Address Payor Plan Phone Number Payor Plan Fax Number Effective Dates    [address] [phone]  2021 - None Entered    [facility] [id number]         Subscriber Name Subscriber Birth Date Member ID       CHELSEA LIU 1960 [id number]                     Emergency Contacts        (Rel.) Home Phone Work Phone Mobile Phone    DAVIDE GOULD (Sister) -- -- [phone]                "

## 2023-10-19 NOTE — SIGNIFICANT NOTE
Case Management/Social Work    Patient Name:  Chelsea Lees  YOB: 1960  MRN: [id number]  Admit Date:  10/17/2023           10/19/23 1315   Post Acute Pre-Cert Documentation   Request Submitted by Facility - Type: Hospital   Post-Acute Authorization Type Submitted: SNF   Date Post Acute Pre-Cert Inititated per Facility 10/19/23   Verification from Payer Yes   Accepting Facility [facility] Discharge Date Requested 10/19/23   Had Accepting Facility at Time of Submission Yes   Response Communicated to: Accepting Facility Liaison   Authorization Number: [id number]   Post Acute Pre-Cert Initiated Comment CM submitted pre-cert via [employer] ([facility]). Pre-cert pending as of 10/19. Auth [id number]. REHAN updated katie Jackson pre-cert has been started.           Electronically signed by:  Neda Reyes RN  10/19/23 13:16 EDT

## 2023-10-19 NOTE — PLAN OF CARE
Goal Outcome Evaluation:              Outcome Evaluation: Pt continues without issues.  Fluids continues.  Will continue to monitor.

## 2023-10-19 NOTE — PROGRESS NOTES
[facility]     Progress Note    Patient Name: Chelsea Lees  : 1960  MRN: [id number]  Primary Care Physician:  Camilla Camara APRN  Date of admission: 10/17/2023    Subjective   Subjective     Chief Complaint: Generalized weakness     Weakness - Generalized    Patient Reports feeling weak.  Black stool reported for the last 3 weeks.  Denies any cp or soa    Review of Systems  12 point ROS reviewed and negative except as mentioned above   Objective   Objective     Vitals:   Temp:  [97.9 °F (36.6 °C)-98.3 °F (36.8 °C)] 98.3 °F (36.8 °C)  Heart Rate:  [] 101  Resp:  [14-23] 15  BP: ()/(61-71) 95/64    Physical Exam   Vitals and nursing note reviewed.   HENT:      Head: Normocephalic.   Eyes:      Extraocular Movements: Extraocular movements intact.      Pupils: Pupils are equal, round, and reactive to light.   Cardiovascular:      Rate and Rhythm: Normal rate and regular rhythm.      Pulses: Normal pulses.      Heart sounds: Normal heart sounds.   Pulmonary:      Effort: Pulmonary effort is normal.      Breath sounds: Normal breath sounds.   Abdominal:      General: Bowel sounds are normal.      Palpations: Abdomen is soft.      Tenderness: There is no abdominal tenderness.   Musculoskeletal:         General: Normal range of motion.      Right lower leg: Edema present.      Left lower leg: Edema present.   Skin:     General: Skin is warm and dry.   Neurological:      Mental Status: She is alert and oriented to person, place, and time.   Psychiatric:         Mood and Affect: Mood normal.      Result Review    Result Review:  I have personally reviewed the results from the time of this admission to 10/19/2023 13:37 EDT and agree with these findings:  [x]  Laboratory list / accordion  []  Microbiology  []  Radiology  []  EKG/Telemetry   []  Cardiology/Vascular   []  Pathology  []  Old records  []  Other:        Assessment & Plan   Assessment / Plan     Brief Patient Summary:  Chelsea DENG  Nabeel is a 63 y.o. female     Active Hospital Problems:  Active Hospital Problems    Diagnosis     **Weakness generalized     Anemia     Melena     Pulmonary emboli     Acute UTI (urinary tract infection)     GERD (gastroesophageal reflux disease)      Plan:   Generalized weakness  lightheadedness  Melena  Shortness of breath  Recent history of PE  Anemia  S/p EGD yesterday showing no active bleeding source in the upper GI tract, erosive gastritis, and small hiatal hernia.  Hemoglobin is 10.4 from 8.7.  Continue to monitor H/H and transfuse as needed.  Continue PPI.  For persistent anemia, may consider EGD/small bowel enteroscopy with repeat colonoscopy.  Okay to restart anticoagulation with recent PE.     Acute urinary tract infection  -WBC 11.8  -Patient is afebrile  -Blood cultures pending, continue to follow  -Patient denies any increased frequency or dysuria  -Urinalysis shows positive nitrite, 1+ leukocytes, 1+ protein, 2+ bilirubin, 3+ bacteria, cloudy appearance, dark yellow color  -Ceftriaxone initiated in the ED, continue     Hypokalemia  -Potassium replacement protocol ordered  -Continue to monitor labs     Bilateral lower extremity swelling  -Patient states she has not been taking full dose of Lasix because she thought it was contributing to her weakness  -Echo from 9/4/2023 showed left ejection fraction to be 56 to 60%  -Continue home dose of Lasix  -Continue to monitor     Anxiety and depression  -Continue home Zoloft     Hyperlipidemia  -Continue home Lipitor     GERD   -Hold home protonix while on IV protonix    DVT prophylaxis:  Medical and mechanical DVT prophylaxis orders are present.    Started on IV Lasix 40 mg daily.  GI consulted and following.  Patient n.p.o.  Eliquis/Plavix on hold.  Labs reviewed.  Hemoglobin 9.7.  Continue monitoring H&H.  Repeat labs and monitor.    CODE STATUS:    Level Of Support Discussed With: Patient  Code Status (Patient has no pulse and is not breathing): CPR  (Attempt to Resuscitate)  Medical Interventions (Patient has pulse or is breathing): Full Support    Disposition:  I expect patient to be discharged in 2 days.    Domingo Kurtz MD

## 2023-10-19 NOTE — THERAPY TREATMENT NOTE
"Subjective: Pt agreeable to therapeutic plan of care. Pt c/o some lightheadedness with morrow of position.     Objective:   Pt in long sitting in bed upon PT arrival and needing to use restroom.     Bed mobility - CGA with additional time  Transfers - min/CGA and with rolling walker for transfer bed <> BSC  Ambulation - 15 feet Min-A and with rolling walker plus one person to manage equipment    Therapeutic Exercise - 10 Reps B LE AROM unsupported sitting / EOB to try to get BP up    Vitals: Orthostatic  BP start tx- 116/71  BP after using BSC 85/54  BP after EOB exercises 102/70  BP at end of tx with pt in chair 83/53    Pain: 5 VAS   Location: back  Intervention for pain: Repositioned, Increased Activity, and Therapeutic Presence    Education: Provided education on the importance of mobility in the acute care setting, Verbal/Tactile Cues, Transfer Training, Gait Training, and Energy conservation strategies    Assessment: Chelsea Lees presents with functional mobility impairments which indicate the need for skilled intervention. Pt continues to be orthostatic with standing and change of position. EOB exercises did elevate BP to level appropriate for pt to ambulate 15 feet with rolling walker. Pt c/o some lightheadedness sitting in chair and CNA was made aware. Tolerating session today without incident. Will continue to follow and progress as tolerated.     Plan/Recommendations:   Moderate Intensity Therapy recommended post-acute care. This is recommended as therapy feels the patient would require 3-4 days per week and wouldn't tolerate \"3 hour daily\" rehab intensity. SNF would be the preferred choice. If the patient does not agree to SNF, arrange HH or OP depending on home bound status. If patient is medically complex, consider LTACH.. Pt requires no DME at discharge.     Pt desires Skilled Rehab placement at discharge. Pt cooperative; agreeable to therapeutic recommendations and plan of care.         Basic " Mobility 6-click:  Rollin = Total, A lot = 2, A little = 3; 4 = None  Supine>Sit:   1 = Total, A lot = 2, A little = 3; 4 = None   Sit>Stand with arms:  1 = Total, A lot = 2, A little = 3; 4 = None  Bed>Chair:   1 = Total, A lot = 2, A little = 3; 4 = None  Ambulate in room:  1 = Total, A lot = 2, A little = 3; 4 = None  3-5 Steps with railin = Total, A lot = 2, A little = 3; 4 = None  Score: 16    Modified Gove: N/A = No pre-op stroke/TIA    Post-Tx Position: Up in Chair and Call light and personal items within reach  PPE: gloves

## 2023-10-19 NOTE — PLAN OF CARE
"Goal Outcome Evaluation:     Assessment: Chelsea Lees presents with functional mobility impairments which indicate the need for skilled intervention. Pt continues to be orthostatic with standing and change of position. EOB exercises did elevate BP to level appropriate for pt to ambulate 15 feet with rolling walker. Pt c/o some lightheadedness sitting in chair and CNA was made aware. Tolerating session today without incident. Will continue to follow and progress as tolerated.     Plan/Recommendations:   Moderate Intensity Therapy recommended post-acute care. This is recommended as therapy feels the patient would require 3-4 days per week and wouldn't tolerate \"3 hour daily\" rehab intensity. SNF would be the preferred choice. If the patient does not agree to SNF, arrange HH or OP depending on home bound status. If patient is medically complex, consider LTACH.. Pt requires no DME at discharge.     Pt desires Skilled Rehab placement at discharge. Pt cooperative; agreeable to therapeutic recommendations and plan of care.                     Anticipated Discharge Disposition (PT): skilled nursing facility  "

## 2023-10-19 NOTE — CASE MANAGEMENT/SOCIAL WORK
Continued Stay Note  [facility]     Patient Name: Chelsea Lees  MRN: [id number]  Today's Date: 10/19/2023    Admit Date: 10/17/2023    Plan: [facility] accepted, skilled. Pre-cert started 10/19, pending. PASRR approved.   Discharge Plan       Row Name 10/19/23 1318       Plan    Plan [facility] accepted, skilled. Pre-cert started 10/19, pending. PASRR approved.    Plan Comments Per katie Jackson, [facility] can accept, skilled. Pre-cert started per CM on 10/19, pending. PASRR approved. CM updated katie Jackson. CM updated MD and floor RN.      Row Name 10/19/23 1204       Plan    Plan [facility] pending acceptance. Pre-cert required. PASRR approved.    Provided Post Acute Provider List? Yes    Post Acute Provider List Inpatient Rehab    Delivered To Patient    Method of Delivery In person    Plan Comments PT/OT is recommending SNF. Patient agreeable, SNF list given. Patient's choices are [facility] or [facility]. CM placed referrals for [facility] and [facility], message sent to katie Jackson, pending response. Pre-cert required. PASRR approved. MD and floor RN of discharge plan. D/C barriers: IV abx, elevated WBC, GI following.             Neda Reyes, RN, BSN    [address] [address]  Phone: [phone]  Fax: [phone]

## 2023-10-19 NOTE — PLAN OF CARE
Goal Outcome Evaluation:   Plan is for patient to discharge when medically stable. PT is recommending rehab placement at discharge.

## 2023-10-19 NOTE — PROGRESS NOTES
LOS: 0 days   Patient Care Team:  Camilla Camara APRN as PCP - General (Nurse Practitioner)  Forrest Escudero MD as PCP - Family Medicine      Subjective     Interval History:     Subjective: Patient continues to complain of bilateral upper extremity and lower extremity weakness.  Denies any abdominal pain.  Tolerating diet without difficulty.  No nausea/vomiting.      ROS:   No chest pain, shortness of breath, or cough.        Medication Review:     Current Facility-Administered Medications:     acetaminophen (TYLENOL) tablet 500 mg, 500 mg, Oral, Q6H PRN, Jaelyn Rios PA-C    atorvastatin (LIPITOR) tablet 40 mg, 40 mg, Oral, Nightly, Jaelyn Rios PA-C, 40 mg at 10/18/23 2018    cefTRIAXone (ROCEPHIN) 2,000 mg in sodium chloride 0.9 % 100 mL IVPB, 2,000 mg, Intravenous, Q24H, Domingo Kurtz MD, Stopped at 10/18/23 1030    cetirizine (zyrTEC) tablet 10 mg, 10 mg, Oral, Daily, Jaelyn Rios PA-C, 10 mg at 10/19/23 0847    furosemide (LASIX) injection 40 mg, 40 mg, Intravenous, Daily, Domingo Kurtz MD, 40 mg at 10/18/23 1047    [Held by provider] furosemide (LASIX) tablet 20 mg, 20 mg, Oral, Daily, Jaelyn Rios PA-C    influenza vac split quad (FLUZONE,FLUARIX,AFLURIA,FLULAVAL) injection 0.5 mL, 0.5 mL, Intramuscular, During Hospitalization, Megan Julian MD    metoclopramide (REGLAN) tablet 5 mg, 5 mg, Oral, TID AC, Jaelyn Rios PA-C, 5 mg at 10/19/23 0848    nitroglycerin (NITROSTAT) SL tablet 0.4 mg, 0.4 mg, Sublingual, Q5 Min PRN, Jaelyn Rios PA-C    pantoprazole (PROTONIX) injection 40 mg, 40 mg, Intravenous, Q12H, Jaelyn Rios PA-C, 40 mg at 10/18/23 2017    Potassium Replacement - Follow Nurse / BPA Driven Protocol, , Does not apply, PRN, Rios, Jaelyn R, PA-C    sertraline (ZOLOFT) tablet 100 mg, 100 mg, Oral, Nightly, Jaelyn Rios PA-C, 100 mg at 10/18/23 2018    [COMPLETED] Insert Peripheral IV, , , Once **AND** sodium chloride 0.9 % flush 10 mL, 10 mL, Intravenous,  AMINTA, Niya Hanson, BREE, 10 mL at 10/18/23 2018      Objective     Vital Signs  Vitals:    10/18/23 2020 10/18/23 2329 10/19/23 0313 10/19/23 0805   BP:  105/63 110/65 106/71   BP Location:  Left arm Left arm Left arm   Patient Position:  Lying Lying Lying   Pulse:  85 91 91   Resp: 23 14 14 14   Temp: 98 °F (36.7 °C) 97.9 °F (36.6 °C) 98.2 °F (36.8 °C) 98 °F (36.7 °C)   TempSrc: Oral Oral Oral Oral   SpO2:    100%   Weight:   84.1 kg (185 lb 6.5 oz)    Height:           Physical Exam:     General Appearance:    Awake and alert, in no acute distress   Head:    Normocephalic, without obvious abnormality   Eyes:          Conjunctivae normal, anicteric sclera   Throat:   No oral lesions, no thrush, oral mucosa moist   Neck:   No adenopathy, supple, no JVD   Lungs:     respirations regular, even and unlabored   Abdomen:     Soft, non-tender, no rebound or guarding, non-distended   Rectal:     Deferred   Extremities:   No edema, no cyanosis   Skin:   No bruising or rash, no jaundice        Results Review:    CBC    Results from last 7 days   Lab Units 10/19/23  0738 10/19/23  0108 10/18/23  1612 10/18/23  0607 10/18/23  0053 10/17/23  1326 10/13/23  1231   WBC 10*3/mm3  --  11.20*  --  11.40*  --  11.80* 14.74*   HEMOGLOBIN g/dL 10.4* 8.7* 9.9* 9.1* 9.5* 7.6* 8.8*   PLATELETS 10*3/mm3  --  455*  --  477*  --  550* 597*     CMP   Results from last 7 days   Lab Units 10/18/23  2303 10/18/23  0607 10/17/23  1326   SODIUM mmol/L 137 135* 134*   POTASSIUM mmol/L 4.1 4.2 3.3*   CHLORIDE mmol/L 107 104 100   CO2 mmol/L 20.0* 25.0 23.0   BUN mg/dL 21 26* 31*   CREATININE mg/dL 0.75 0.76 1.00   GLUCOSE mg/dL 106* 91 152*   ALBUMIN g/dL 1.2*  --  1.6*   BILIRUBIN mg/dL 0.2  --  0.3   ALK PHOS U/L 132*  --  144*   AST (SGOT) U/L 50*  --  45*   ALT (SGPT) U/L 48*  --  55*     Cr Clearance Estimated Creatinine Clearance: 84.5 mL/min (by C-G formula based on SCr of 0.75 mg/dL).  Coag   Results from last 7 days   Lab Units  "10/18/23  2303   INR  1.25*     HbA1C No results found for: \"HGBA1C\"  Blood Glucose No results found for: \"POCGLU\"  Infection   Results from last 7 days   Lab Units 10/17/23  1508 10/17/23  1452   BLOODCX  No growth at 24 hours No growth at 24 hours     UA    Results from last 7 days   Lab Units 10/17/23  1158   NITRITE UA  Positive*   WBC UA /HPF 0-2   BACTERIA UA /HPF 3+*   SQUAM EPITHEL UA /HPF None Seen     Radiology(recent) US Liver    Result Date: 10/18/2023  Impression: 1. Hepatic cyst, otherwise unremarkable liver ultrasound. Electronically Signed: Blayne Toledo MD  10/18/2023 12:38 PM EDT  Workstation ID: [id number]    XR Chest 1 View    Result Date: 10/17/2023  Impression: No acute process identified. Electronically Signed: Jama Steward MD  10/17/2023 11:51 AM CDT  Workstation ID: [id number]        Assessment & Plan     ASSESSMENT:  -Melena  -Acute blood loss anemia  -Early satiety  -Unintentional weight loss  -Elevated LFTs  -Leukocytosis  -UTI  -Bilateral PE -on Eliquis  -History of CVA  -History of Hodgkin's lymphoma in remission  -Hypertension  -Hyperlipidemia  -History of splenectomy     PLAN:  Patient is a 63-year-old female with history of newly diagnosed bilateral PE on Eliquis, splenectomy, and Hodgkin's lymphoma in remission who presented on 10/17 with complaints of weakness.  She was found to have anemia with hemoglobin of 7.6 on admission.  GI has been asked to consult for further evaluation.    Patient reports that she is feeling slightly better today.  Denies any abdominal pain.  No nausea/vomiting.  No overt GI bleeding.  Continues to have bilateral upper extremity and lower extremity weakness.  S/p EGD yesterday showing no active bleeding source in the upper GI tract, erosive gastritis, and small hiatal hernia.  Hemoglobin is 10.4 from 8.7.  Continue to monitor H/H and transfuse as needed.  Continue PPI.  For persistent anemia, may consider EGD/small bowel enteroscopy with repeat " colonoscopy.  Okay to restart anticoagulation with recent PE.  We will start oral iron.  Not much else to add from a GI standpoint as an inpatient at this time.  The patient can follow-up in our office in 1 to 2 months after discharge.  We will be available as needed.    Electronically signed by BREE Robison, 10/19/23, 11:22 AM EDT.

## 2023-10-19 NOTE — CONSULTS
Nutrition Services    Patient Name: Chelsea Lese  YOB: 1960  MRN: [id number]  Admission date: 10/17/2023    Comment:    Continue current diet. Pt declined ONS.    Moderate acute disease related malnutrition related to decreased appetite and early satiety suspect related to erosive gastritis and a small hiatal hernia as evidenced by PO intake meeting less 75% of estimated energy requirement for greater than 7 days and moderate muscle wasting per NFPE.    See MSA below.     PPE Documentation        PPE Worn By Provider gloves   PPE Worn By Patient  N/A     CLINICAL NUTRITION ASSESSMENT      Reason for Assessment 10/19: nursing admission screen consult, MST: 4      H&P      Past Medical History:   Diagnosis Date    Cancer 09/19/1985    Stroke 12/14/2018       Past Surgical History:   Procedure Laterality Date    ENDOSCOPY N/A 10/18/2023    Procedure: ESOPHAGOGASTRODUODENOSCOPY WITH BIOPSY X1 AREA;  Surgeon: Rupa Renteria MD;  Location: [facility] ENDOSCOPY;  Service: Gastroenterology;  Laterality: N/A;  post: GASTRITIS, HIATAL HERNIA, JUAN ESOPHAGITIS    LYMPH NODE DISSECTION  1985    abdomen and neck    SPLENECTOMY  1985        Current Problems   Generalized weakness  Lightheadedness  Melena  SOB  Recent PE  Anemia  -GI was following but will now see pt as outpatient    Acute UTI  -abx    Bilateral lower extremity swelling   Anxiety/depression  HLD  GERD       Encounter Information        Trending Narrative     10/19: Pt admitted to [facility] with generalized weakness. Pt with recent hospitalization with discharge on 9/5 after she was treated for a bilateral PE. Pt reports her stools changes to black and green within the last two weeks. Pt also reports 30# wt loss x 1-2 months. Pt s/p EGD which showed no active bleeding source in upper GI tract, erosive gastritis, and a small hiatal hernia. RD visited pt at bedside. Pt reports she has been taking a few bites at meals since Labor Day. Pt has been trying to  "eat every couple of hours knowing she needs nutrition and is experiencing early satiety. Pt reports she has been eating better here than she has in a long time. Pt enjoying the food at [facility]. Pt stated she tried Ensure at home but then experience diarrhea. Pt declined Boost out of fear that diarrhea would follow. NFPE completed, consistent with nutrition diagnosis of malnutrition using AND/ASPEN criteria. See MSA below.        Anthropometrics        Current Height, Weight Height: 170.2 cm (67\")  Weight: 84.1 kg (185 lb 6.5 oz) (10/19/23 0313)       Usual Body Weight (UBW) Unknown        Trending Weight Hx     This admission: 10/19: 185# - scale              PTA: Current wt c/w stated wt from September admission     Wt Readings from Last 30 Encounters:   10/19/23 0313 84.1 kg (185 lb 6.5 oz)   10/17/23 1123 78.5 kg (173 lb)   09/04/23 1308 83.5 kg (184 lb)   09/03/23 1530 83.5 kg (184 lb)      BMI kg/m2 Body mass index is 29.04 kg/m².       Labs        Pertinent Labs Elevated liver enzymes    Results from last 7 days   Lab Units 10/18/23  2303 10/18/23  0607 10/17/23  1326   SODIUM mmol/L 137 135* 134*   POTASSIUM mmol/L 4.1 4.2 3.3*   CHLORIDE mmol/L 107 104 100   CO2 mmol/L 20.0* 25.0 23.0   BUN mg/dL 21 26* 31*   CREATININE mg/dL 0.75 0.76 1.00   CALCIUM mg/dL 7.0* 7.4* 7.5*   BILIRUBIN mg/dL 0.2  --  0.3   ALK PHOS U/L 132*  --  144*   ALT (SGPT) U/L 48*  --  55*   AST (SGOT) U/L 50*  --  45*   GLUCOSE mg/dL 106* 91 152*     Results from last 7 days   Lab Units 10/19/23  0738   HEMOGLOBIN g/dL 10.4*   HEMATOCRIT % 31.0*     No results found for: \"HGBA1C\"     Medications    Scheduled Medications atorvastatin, 40 mg, Oral, Nightly  cefTRIAXone, 2,000 mg, Intravenous, Q24H  cetirizine, 10 mg, Oral, Daily  ferrous sulfate, 324 mg, Oral, Daily With Breakfast  furosemide, 40 mg, Intravenous, Daily  [Held by provider] furosemide, 20 mg, Oral, Daily  metoclopramide, 5 mg, Oral, TID AC  pantoprazole, 40 mg, Intravenous, " Q12H  sertraline, 100 mg, Oral, Nightly        Infusions      PRN Medications   acetaminophen    influenza vaccine    nitroglycerin    Potassium Replacement - Follow Nurse / BPA Driven Protocol    [COMPLETED] Insert Peripheral IV **AND** sodium chloride     Physical Findings        Trending Physical   Appearance, NFPE 10/19: NFPE completed, consistent with nutrition diagnosis of malnutrition using AND/ASPEN criteria. See MSA below.      --  Edema  None documented      Bowel Function + BM on 10/19     Tubes None      Chewing/Swallowing No reported difficulty      Skin Intact      --  Current Nutrition Orders & Evaluation of Intake       Oral Nutrition     Food Allergies NKFA   Current PO Diet Diet: Regular/House Diet; Texture: Regular Texture (IDDSI 7); Fluid Consistency: Thin (IDDSI 0)   Supplement -   PO Evaluation     Trending % PO Intake 10/19: 50% x 3 meals recorded this admission    --  Nutritional Risk Screening        NRS-2002 Score          Nutrition Diagnosis         Nutrition Dx Problem 1 Moderate acute disease related malnutrition related to decreased appetite and early satiety suspect related to erosive gastritis and a small hiatal hernia as evidenced by PO intake meeting less 75% of estimated energy requirement for greater than 7 days and moderate muscle wasting per NFPE.      Nutrition Dx Problem 2        Intervention Goal         Intervention Goal(s) PO intake > 50%     Nutrition Intervention        RD Action Continue current diet. Pt declined ONS.  NFPE completed.     Nutrition Prescription          Diet Prescription Regular    Supplement Prescription -   --  Monitor/Evaluation        Monitor Per protocol, PO intake, Pertinent labs, Weight, GI status     Malnutrition Severity Assessment      Patient meets criteria for : Moderate (non-severe) Malnutrition  Malnutrition Type (last 8 hours)       Malnutrition Severity Assessment       Row Name 10/19/23 8320       Malnutrition Severity Assessment     Malnutrition Type Acute Disease or Injury - Related Malnutrition      Row Name 10/19/23 1336       Insufficient Energy Intake     Insufficient Energy Intake Findings Moderate    Insufficient Energy Intake  <75% of est. energy requirement for >7d)      Row Name 10/19/23 1336       Muscle Loss    Loss of Muscle Mass Findings Moderate    [place] Moderate - slight depression    Clavicle Bone Region Moderate - some protrusion in females, visible in males      Row Name 10/19/23 1336       Criteria Met (Must meet criteria for severity in at least 2 of these categories: M Wasting, Fat Loss, Fluid, Secondary Signs, Wt. Status, Intake)    Patient meets criteria for  Moderate (non-severe) Malnutrition                       Electronically signed by:  Fatoumata Vegas RD  10/19/23 12:12 EDT

## 2023-10-19 NOTE — DISCHARGE PLACEMENT REQUEST
"Chelsea Liu (63 y.o. Female)       Date of Birth   1960    Social Security Number       Address   [address] [place] [address] [place] [facility]    Home Phone   [phone]    MRN   [id number]       [facility]    Marital Status                               Admission Date   10/17/23    Admission Type   Emergency    Admitting Provider   Megan Julian MD    Attending Provider   Richard Mathews MD    Department, Room/Bed   [facility] 3C MEDICAL INPATIENT, 377/       Discharge Date       Discharge Disposition       Discharge Destination                                 Attending Provider: Richard Mathews MD    Allergies: No Known Allergies    Isolation: None   Infection: None   Code Status: CPR    Ht: 170.2 cm (67\")   Wt: 84.1 kg (185 lb 6.5 oz)    Admission Cmt: None   Principal Problem: Weakness generalized [R53.1]                   Active Insurance as of 10/17/2023       Primary Coverage       Payor Plan Insurance Group Employer/Plan Group    [employer] BLUE [employer]OSS [employer] BLUE CROSS BLUE [employer] PPO [id number]       Payor Plan Address Payor Plan Phone Number Payor Plan Fax Number Effective Dates    [address] [phone]  2021 - None Entered    [place] [address]         Subscriber Name Subscriber Birth Date Member ID       CHELSEA LIU 1960 [id number]                     Emergency Contacts        (Rel.) Home Phone Work Phone Mobile Phone    DAVIDE GOULD (Sister) -- -- [phone]                 History & Physical        Jaelyn Rios PA-C at 10/17/23 1511       Attestation signed by Megan Julian MD at 10/18/23 1619    I have reviewed this documentation and agree.                      [facility]  History & Physical    Patient Name: Chelsea Liu  : 1960  MRN: [id number]  Primary Care Physician:  Camilla Camara APRN  Date of admission: 10/17/2023  Date and Time of Service: 10/17/23 at " 1511    Subjective      Chief Complaint: Generalized weakness    History of Present Illness: Chelsea Lees is a 63 y.o. female who presented to [facility] on 10/17/2023 complaining of generalized weakness and lightheadedness for the past 3 weeks.  Patient was recently discharged on 9/5/2023, she had presented to the hospital with shortness of breath and dysphagia and was diagnosed with bilateral PE and placed on Eliquis.  Patient states she started to experience black stools shortly after discharge, patient states approximately 2 weeks ago her stools changed to a black and green color. Patient denies any bright red blood in stool. Patient states she is not able to walk very far and she feels weak and has to rest frequently.  Patient has been experiencing shortness of breath since her last admission to the hospital with activity.  Patient notes 1 episode of diarrhea last night and denies any bright red blood.  Patient notes about a 30 pound weight loss in the past 1 to 2 months and she states she has been having early satiety.  Patient denies any nausea, vomiting, abdominal pain, chest pain, cough, headache, dizziness, syncope    In the ED, All vitals stable on admission except BP 98/65. All labs unremarkable except sodium 134, potassium 3.3, BUN 31, glucose 152, calcium 7.5, alkaline phosphatase 144, chloride albumin 1.6, AST 45, ALT 55, C-reactive protein 0.63, WBC 11.8, hemoglobin 7.6, hematocrit 23.3, platelets 550.  Patient received 1 unit of packed RBCs in ED. Hospitalist was contacted to admit patient for further care and management.     XR Chest 1 View    Result Date: 10/17/2023  Impression: No acute process identified. Electronically Signed: Jama Steward MD  10/17/2023 11:51 AM CDT  Workstation ID: [id number]     ECG 12 Lead Other; generalized weakness   Preliminary Result   HEART RATE= 115  bpm   RR Interval= 524  ms   VT Interval= 135  ms   P Horizontal Axis= 5  deg   P Front Axis= 61  deg    QRSD Interval= 139  ms   QT Interval= 362  ms   QTcB= 500  ms   QRS Axis= 193  deg   T Wave Axis= 46  deg   - ABNORMAL ECG -   Sinus tachycardia   Right bundle branch block   When compared with ECG of 03-Sep-2023 15:54:30,   No significant change   Electronically Signed By:    Date and Time of Study: 2023-10-17 11:57:27          ROS 12 point ROS reviewed and negative except as mentioned above.      Personal History     Past Medical History:   Diagnosis Date    Cancer 09/19/1985    Stroke 12/14/2018       Past Surgical History:   Procedure Laterality Date    LYMPH NODE DISSECTION  1985    abdomen and neck    SPLENECTOMY  1985       Family History: family history includes Cancer in her brother; Heart attack in her father. Otherwise pertinent FHx was reviewed and not pertinent to current issue.    Social History:  reports that she has never smoked. She has never used smokeless tobacco. She reports that she does not drink alcohol and does not use drugs.    Home Medications:  Prior to Admission Medications       Prescriptions Last Dose Informant Patient Reported? Taking?    acetaminophen (TYLENOL) 650 MG 8 hr tablet 10/16/2023  Yes Yes    Take 2 tablets by mouth Every 8 (Eight) Hours As Needed for Mild Pain.    apixaban (ELIQUIS) 5 MG tablet tablet 10/16/2023  No Yes    Take 1 tablet by mouth 2 (Two) Times a Day. Indications: DVT/PE (active thrombosis)    atorvastatin (LIPITOR) 40 MG tablet 10/16/2023  Yes Yes    Take 1 tablet by mouth Every Night.    cetirizine (zyrTEC) 10 MG tablet 10/16/2023  Yes Yes    Take 1 tablet by mouth Daily.    clopidogrel (PLAVIX) 75 MG tablet 10/16/2023  Yes Yes    Take 1 tablet by mouth Daily.    furosemide (LASIX) 20 MG tablet Past Month  Yes Yes    Take 1 tablet by mouth Daily.    metoclopramide (REGLAN) 5 MG tablet Past Month  No Yes    Take 1 tablet by mouth 3 (Three) Times a Day Before Meals.    pantoprazole (PROTONIX) 40 MG EC tablet 10/16/2023  No Yes    Take 1 tablet by mouth  Every Morning.    sertraline (ZOLOFT) 100 MG tablet 10/16/2023  Yes Yes    Take 1 tablet by mouth Every Night.              Allergies:  No Known Allergies    Objective      Vitals:   Temp:  [98 °F (36.7 °C)-98.4 °F (36.9 °C)] 98.1 °F (36.7 °C)  Heart Rate:  [] 86  Resp:  [15-16] 16  BP: ()/(58-69) 123/69    Physical Exam  Vitals and nursing note reviewed.   HENT:      Head: Normocephalic.   Eyes:      Extraocular Movements: Extraocular movements intact.      Pupils: Pupils are equal, round, and reactive to light.   Cardiovascular:      Rate and Rhythm: Normal rate and regular rhythm.      Pulses: Normal pulses.      Heart sounds: Normal heart sounds.   Pulmonary:      Effort: Pulmonary effort is normal.      Breath sounds: Normal breath sounds.   Abdominal:      General: Bowel sounds are normal.      Palpations: Abdomen is soft.      Tenderness: There is no abdominal tenderness.   Musculoskeletal:         General: Normal range of motion.      Right lower leg: Edema present.      Left lower leg: Edema present.   Skin:     General: Skin is warm and dry.   Neurological:      Mental Status: She is alert and oriented to person, place, and time.   Psychiatric:         Mood and Affect: Mood normal.          Result Review   Result Review:  I have personally reviewed the results from the time of this admission to 10/17/2023 19:37 EDT and agree with these findings:  [x]  Laboratory  [x]  Microbiology  [x]  Radiology  [x]  EKG/Telemetry   []  Cardiology/Vascular   []  Pathology  []  Old records  []  Other:  Most notable findings include:     CBC:      Lab 10/17/23  1326 10/13/23  1231   WBC 11.80* 14.74*   HEMOGLOBIN 7.6* 8.8*   HEMATOCRIT 23.3* 25.7*   PLATELETS 550* 597*   NEUTROS ABS 8.61* 5.01   LYMPHS ABS  --  8.75*   MONOS ABS  --  0.86   EOS ABS  --  0.10   .3* 95.5     CMP:        Lab 10/17/23  1326   SODIUM 134*   POTASSIUM 3.3*   CHLORIDE 100   CO2 23.0   ANION GAP 11.0   BUN 31*   CREATININE 1.00    EGFR 63.0   GLUCOSE 152*   CALCIUM 7.5*   TOTAL PROTEIN 4.3*   ALBUMIN 1.6*   GLOBULIN 2.7   ALT (SGPT) 55*   AST (SGOT) 45*   BILIRUBIN 0.3   ALK PHOS 144*           Assessment & Plan        Active Hospital Problems:  Active Hospital Problems    Diagnosis     **Weakness generalized     Pulmonary emboli     Acute UTI (urinary tract infection)     GERD (gastroesophageal reflux disease)      Plan:   Generalized weakness  lightheadedness  Melena  Shortness of breath  Recent history of PE  Anemia  -Weakness, dizziness, shortness of breath likely related to anemia which may be due to blood loss  -Chest x-ray radiology report states no acute process identified  -Patient currently stable on room air  -Hemoglobin 7.6  -1 unit PRBC ordered by ED provider as patient is symptomatic  -Continue to monitor hemoglobin and hematocrit every 6 hours  -Transfuse for hemoglobin less than 7  -IV Protonix ordered  -Clear liquid diet ordered for now, NPO after midnight  -Gentle IV fluid hydration ordered  -Hold home Eliquis and Plavix due to possible GI bleed  -GI consulted  -PT OT consulted for further evaluation of generalized weakness    Acute urinary tract infection  -WBC 11.8  -Patient is afebrile  -Blood cultures pending, continue to follow  -Patient denies any increased frequency or dysuria  -Urinalysis shows positive nitrite, 1+ leukocytes, 1+ protein, 2+ bilirubin, 3+ bacteria, cloudy appearance, dark yellow color  -Ceftriaxone initiated in the ED, continue    Hypokalemia  -Potassium replacement protocol ordered  -Continue to monitor labs    Bilateral lower extremity swelling  -Patient states she has not been taking full dose of Lasix because she thought it was contributing to her weakness  -Echo from 9/4/2023 showed left ejection fraction to be 56 to 60%  -Continue home dose of Lasix  -Continue to monitor    Anxiety and depression  -Continue home Zoloft    Hyperlipidemia  -Continue home Lipitor    GERD   -Hold home protonix  while on IV protonix    DVT prophylaxis:  Mechanical DVT prophylaxis orders are present.    CODE STATUS:    Level Of Support Discussed With: Patient  Code Status (Patient has no pulse and is not breathing): CPR (Attempt to Resuscitate)  Medical Interventions (Patient has pulse or is breathing): Full Support    Admission Status:  I believe this patient meets observation status.    I discussed the patient's findings and my recommendations with patient and family.    This patient has been examined wearing appropriate Personal Protective Equipment . 10/17/23      Signature: Electronically signed by Jaelyn Rios PA-C, 10/17/23, 19:37 EDT.  [facility] [place] Hospitalist Team    Electronically signed by Megan Julian MD at 10/18/23 1619          Physician Progress Notes (most recent note)        Karely Cazares APRN at 10/19/23 1122           LOS: 0 days   Patient Care Team:  aCmilla Camara APRN as PCP - General (Nurse Practitioner)  Forrest Escudero MD as PCP - Family Medicine      Subjective     Interval History:     Subjective: Patient continues to complain of bilateral upper extremity and lower extremity weakness.  Denies any abdominal pain.  Tolerating diet without difficulty.  No nausea/vomiting.      ROS:   No chest pain, shortness of breath, or cough.        Medication Review:     Current Facility-Administered Medications:     acetaminophen (TYLENOL) tablet 500 mg, 500 mg, Oral, Q6H PRN, Jaelyn Rios PA-C    atorvastatin (LIPITOR) tablet 40 mg, 40 mg, Oral, Nightly, Jaelyn Rios PA-C, 40 mg at 10/18/23 2018    cefTRIAXone (ROCEPHIN) 2,000 mg in sodium chloride 0.9 % 100 mL IVPB, 2,000 mg, Intravenous, Q24H, Domingo Kurtz MD, Stopped at 10/18/23 1030    cetirizine (zyrTEC) tablet 10 mg, 10 mg, Oral, Daily, Jaelyn Rios PA-C, 10 mg at 10/19/23 0847    furosemide (LASIX) injection 40 mg, 40 mg, Intravenous, Daily, Domingo Kurtz MD, 40 mg at 10/18/23 1047    [Held by provider] furosemide (LASIX)  tablet 20 mg, 20 mg, Oral, Daily, Jaelyn Rios PA-C    influenza vac split quad (FLUZONE,FLUARIX,AFLURIA,FLULAVAL) injection 0.5 mL, 0.5 mL, Intramuscular, During Hospitalization, Megan Julian MD    metoclopramide (REGLAN) tablet 5 mg, 5 mg, Oral, TID AC, Jaelyn Rios PA-C, 5 mg at 10/19/23 0848    nitroglycerin (NITROSTAT) SL tablet 0.4 mg, 0.4 mg, Sublingual, Q5 Min PRN, Jaelyn Rios PA-C    pantoprazole (PROTONIX) injection 40 mg, 40 mg, Intravenous, Q12H, Jaelyn Rios PA-C, 40 mg at 10/18/23 2017    Potassium Replacement - Follow Nurse / BPA Driven Protocol, , Does not apply, PRN, Jaelyn Rios PA-C    sertraline (ZOLOFT) tablet 100 mg, 100 mg, Oral, Nightly, Jaelyn Rios PA-C, 100 mg at 10/18/23 2018    [COMPLETED] Insert Peripheral IV, , , Once **AND** sodium chloride 0.9 % flush 10 mL, 10 mL, Intravenous, PRN, Niya Hanson APRN, 10 mL at 10/18/23 2018      Objective     Vital Signs  Vitals:    10/18/23 2020 10/18/23 2329 10/19/23 0313 10/19/23 0805   BP:  105/63 110/65 106/71   BP Location:  Left arm Left arm Left arm   Patient Position:  Lying Lying Lying   Pulse:  85 91 91   Resp: 23 14 14 14   Temp: 98 °F (36.7 °C) 97.9 °F (36.6 °C) 98.2 °F (36.8 °C) 98 °F (36.7 °C)   TempSrc: Oral Oral Oral Oral   SpO2:    100%   Weight:   84.1 kg (185 lb 6.5 oz)    Height:           Physical Exam:     General Appearance:    Awake and alert, in no acute distress   Head:    Normocephalic, without obvious abnormality   Eyes:          Conjunctivae normal, anicteric sclera   Throat:   No oral lesions, no thrush, oral mucosa moist   Neck:   No adenopathy, supple, no JVD   Lungs:     respirations regular, even and unlabored   Abdomen:     Soft, non-tender, no rebound or guarding, non-distended   Rectal:     Deferred   Extremities:   No edema, no cyanosis   Skin:   No bruising or rash, no jaundice        Results Review:    CBC    Results from last 7 days   Lab Units 10/19/23  0738 10/19/23  0102  "10/18/23  1612 10/18/23  0607 10/18/23  0053 10/17/23  1326 10/13/23  1231   WBC 10*3/mm3  --  11.20*  --  11.40*  --  11.80* 14.74*   HEMOGLOBIN g/dL 10.4* 8.7* 9.9* 9.1* 9.5* 7.6* 8.8*   PLATELETS 10*3/mm3  --  455*  --  477*  --  550* 597*     CMP   Results from last 7 days   Lab Units 10/18/23  2303 10/18/23  0607 10/17/23  1326   SODIUM mmol/L 137 135* 134*   POTASSIUM mmol/L 4.1 4.2 3.3*   CHLORIDE mmol/L 107 104 100   CO2 mmol/L 20.0* 25.0 23.0   BUN mg/dL 21 26* 31*   CREATININE mg/dL 0.75 0.76 1.00   GLUCOSE mg/dL 106* 91 152*   ALBUMIN g/dL 1.2*  --  1.6*   BILIRUBIN mg/dL 0.2  --  0.3   ALK PHOS U/L 132*  --  144*   AST (SGOT) U/L 50*  --  45*   ALT (SGPT) U/L 48*  --  55*     Cr Clearance Estimated Creatinine Clearance: 84.5 mL/min (by C-G formula based on SCr of 0.75 mg/dL).  Coag   Results from last 7 days   Lab Units 10/18/23  2303   INR  1.25*     HbA1C No results found for: \"HGBA1C\"  Blood Glucose No results found for: \"POCGLU\"  Infection   Results from last 7 days   Lab Units 10/17/23  1508 10/17/23  1452   BLOODCX  No growth at 24 hours No growth at 24 hours     UA    Results from last 7 days   Lab Units 10/17/23  1158   NITRITE UA  Positive*   WBC UA /HPF 0-2   BACTERIA UA /HPF 3+*   SQUAM EPITHEL UA /HPF None Seen     Radiology(recent) US Liver    Result Date: 10/18/2023  Impression: 1. Hepatic cyst, otherwise unremarkable liver ultrasound. Electronically Signed: Blayne Toledo MD  10/18/2023 12:38 PM EDT  Workstation ID: [id number]    XR Chest 1 View    Result Date: 10/17/2023  Impression: No acute process identified. Electronically Signed: Jama Steward MD  10/17/2023 11:51 AM CDT  Workstation ID: [id number]        Assessment & Plan     ASSESSMENT:  -Melena  -Acute blood loss anemia  -Early satiety  -Unintentional weight loss  -Elevated LFTs  -Leukocytosis  -UTI  -Bilateral PE -on Eliquis  -History of CVA  -History of Hodgkin's lymphoma in remission  -Hypertension  -Hyperlipidemia  -History of " splenectomy     PLAN:  Patient is a 63-year-old female with history of newly diagnosed bilateral PE on Eliquis, splenectomy, and Hodgkin's lymphoma in remission who presented on 10/17 with complaints of weakness.  She was found to have anemia with hemoglobin of 7.6 on admission.  GI has been asked to consult for further evaluation.    Patient reports that she is feeling slightly better today.  Denies any abdominal pain.  No nausea/vomiting.  No overt GI bleeding.  Continues to have bilateral upper extremity and lower extremity weakness.  S/p EGD yesterday showing no active bleeding source in the upper GI tract, erosive gastritis, and small hiatal hernia.  Hemoglobin is 10.4 from 8.7.  Continue to monitor H/H and transfuse as needed.  Continue PPI.  For persistent anemia, may consider EGD/small bowel enteroscopy with repeat colonoscopy.  Okay to restart anticoagulation with recent PE.  We will start oral iron.  Not much else to add from a GI standpoint as an inpatient at this time.  The patient can follow-up in our office in 1 to 2 months after discharge.  We will be available as needed.    Electronically signed by BREE Robison, 10/19/23, 11:22 AM EDT.             Electronically signed by Karely Cazares APRN at 10/19/23 1124          Consult Notes (most recent note)        Karely Cazares APRN at 10/18/23 0951        Consult Orders    1. Inpatient Gastroenterology Consult [[id number]] ordered by Jaelyn Rios PA-C at 10/17/23 1536              Attestation signed by Rupa Renteria MD at 10/18/23 1227    I have reviewed this documentation and agree.  I, the attending physician, have performed the medical decision making for this patient.  Patient seen and examined.  Nurse practitioner findings verified.  Labs and imaging personally reviewed.    63 years old female with history of Hodgkin lymphoma in remission, CVA, she has been recently diagnosed with PE has been on Eliquis, was discharged from the  hospital September 5 has been having some melena dark stool feeling progressively weak fatigue and tired came to the emergency room with a profound anemia.  She is denying any hematemesis no bright red blood per rectum.  She had a colonoscopy 2021 showing sessile serrated adenomatous polyp with low-grade dysplasia.    On examination abdominous soft, nondistended minimally tender.  Hemoglobin dropped to 7.6 is now 9.1 after blood transfusion.  BUN was 31 at the time admission which is now down to 26.  Nitrite was positive.    Suspect patient may have possible NSAID induced ulcer or erosive gastritis or peptic ulcer disease leading to a blood loss anemia being on Eliquis.  Other possibility may be a AVM.  Her hemoglobin was fairly normal a month or so ago, has progressive anemia with drop in hemoglobin since starting Eliquis.  We will proceed with upper endoscopy examination.  She has been appropriately placed on a PPI.  She is a minimal elevation in LFTs we will perform further work-up for that as well.  Currently on antibiotic for UTI.  We will follow.                GI CONSULT  NOTE:    Referring Provider:  MIGUEL Mohan    Chief complaint: Melena, anemia    Subjective .     History of present illness: Chelsea Lees is a 63 y.o. female with history of CVA, Hodgkin's lymphoma in remission, bilateral PE on Eliquis, hypertension, hyperlipidemia, and splenectomy who presents with complaints of weakness.  The patient was recently discharged on 9/5/2023 for an admission for complaints of shortness of breath.  At that time, she was diagnosed with bilateral PEs and was started on Eliquis.  She states that she began to notice melena shortly after her discharge.  States that she saw melena for approximately 3 weeks, but this has now resolved.  Denies bright red blood per rectum.  States that she typically moves her bowels regularly.  Denies any abdominal pain.  Does complain of early satiety.  Denies nausea/vomiting,  heartburn, or dysphagia.  States that she has been taking pantoprazole daily.  Denies any NSAID use.  No recent fever.  Denies abdominal pain.  Does report some unintentional weight loss recently.      Endo History:  5/2021 colonoscopy (Dr. Medrano) -polyps (SSA with low-grade dysplasia)  No previous EGD.    Past Medical History:  Past Medical History:   Diagnosis Date    Cancer 09/19/1985    Stroke 12/14/2018       Past Surgical History:  Past Surgical History:   Procedure Laterality Date    LYMPH NODE DISSECTION  1985    abdomen and neck    SPLENECTOMY  1985       Social History:  Social History     Tobacco Use    Smoking status: Never    Smokeless tobacco: Never   Vaping Use    Vaping Use: Never used   Substance Use Topics    Alcohol use: Never    Drug use: Never       Family History:  Family History   Problem Relation Age of Onset    Heart attack Father     Cancer Brother        Medications:  Medications Prior to Admission   Medication Sig Dispense Refill Last Dose    acetaminophen (TYLENOL) 650 MG 8 hr tablet Take 2 tablets by mouth Every 8 (Eight) Hours As Needed for Mild Pain.   10/16/2023    apixaban (ELIQUIS) 5 MG tablet tablet Take 1 tablet by mouth 2 (Two) Times a Day. Indications: DVT/PE (active thrombosis) 60 tablet 0 10/16/2023    atorvastatin (LIPITOR) 40 MG tablet Take 1 tablet by mouth Every Night.   10/16/2023    cetirizine (zyrTEC) 10 MG tablet Take 1 tablet by mouth Daily.   10/16/2023    clopidogrel (PLAVIX) 75 MG tablet Take 1 tablet by mouth Daily.   10/16/2023    furosemide (LASIX) 20 MG tablet Take 1 tablet by mouth Daily.   Past Month    metoclopramide (REGLAN) 5 MG tablet Take 1 tablet by mouth 3 (Three) Times a Day Before Meals. 20 tablet 0 Past Month    pantoprazole (PROTONIX) 40 MG EC tablet Take 1 tablet by mouth Every Morning. 30 tablet 0 10/16/2023    sertraline (ZOLOFT) 100 MG tablet Take 1 tablet by mouth Every Night.   10/16/2023       Scheduled Meds:atorvastatin, 40 mg, Oral,  Nightly  cefTRIAXone, 2,000 mg, Intravenous, Q24H  cetirizine, 10 mg, Oral, Daily  furosemide, 20 mg, Oral, Daily  metoclopramide, 5 mg, Oral, TID AC  pantoprazole, 40 mg, Intravenous, Q12H  sertraline, 100 mg, Oral, Nightly      Continuous Infusions:sodium chloride, 50 mL/hr, Last Rate: 50 mL/hr (10/17/23 2032)      PRN Meds:.  acetaminophen    influenza vaccine    nitroglycerin    Potassium Replacement - Follow Nurse / BPA Driven Protocol    [COMPLETED] Insert Peripheral IV **AND** sodium chloride    ALLERGIES:  Patient has no known allergies.    ROS:  The following systems were reviewed;   Constitution:  No fevers, no chills, unintentional weight loss  Skin: no rash, no jaundice  Eyes:  No blurry vision, no eye pain  HENT:  No change in hearing or smell  Resp:  No dyspnea or cough  CV:  No chest pain or palpitations  :  No dysuria, hematuria  Musculoskeletal:  No leg cramps or arthralgias  Neuro:  No tremor, no numbness  Psych:  No depression or confusion    Objective     Vital Signs:   Vitals:    10/17/23 2300 10/18/23 0022 10/18/23 0414 10/18/23 0829   BP:  114/72 113/62 125/60   BP Location:  Right arm Right arm Right arm   Patient Position:  Lying Lying Lying   Pulse:   89    Resp:  14 20 18   Temp:  98 °F (36.7 °C) 97.7 °F (36.5 °C) 97.8 °F (36.6 °C)   TempSrc: Oral Oral Oral Oral   SpO2:   98%    Weight:       Height:           Physical Exam:       General Appearance:    Awake and alert, in no acute distress   Head:    Normocephalic, without obvious abnormality, atraumatic   Throat:   No oral lesions, no thrush, oral mucosa moist   Lungs:     Respirations regular, even and unlabored   Chest Wall:    No abnormalities observed   Abdomen:     Soft, non-tender, no rebound or guarding, non-distended   Rectal:     Deferred   Extremities:   Moves all extremities, no edema, no cyanosis   Pulses:   Pulses palpable and equal bilaterally   Skin:   No rash, no jaundice, normal palpation   Lymph nodes:   No cervical,  "supraclavicular or submandibular palpable adenopathy   Neurologic:   Cranial nerves 2 - 12 grossly intact, no asterixis       Results Review:   I reviewed the patient's labs and imaging.  CBC    Results from last 7 days   Lab Units 10/18/23  0607 10/18/23  0053 10/17/23  1326 10/13/23  1231   WBC 10*3/mm3 11.40*  --  11.80* 14.74*   HEMOGLOBIN g/dL 9.1* 9.5* 7.6* 8.8*   PLATELETS 10*3/mm3 477*  --  550* 597*     CMP   Results from last 7 days   Lab Units 10/18/23  0607 10/17/23  1326   SODIUM mmol/L 135* 134*   POTASSIUM mmol/L 4.2 3.3*   CHLORIDE mmol/L 104 100   CO2 mmol/L 25.0 23.0   BUN mg/dL 26* 31*   CREATININE mg/dL 0.76 1.00   GLUCOSE mg/dL 91 152*   ALBUMIN g/dL  --  1.6*   BILIRUBIN mg/dL  --  0.3   ALK PHOS U/L  --  144*   AST (SGOT) U/L  --  45*   ALT (SGPT) U/L  --  55*     Cr Clearance Estimated Creatinine Clearance: 80.8 mL/min (by C-G formula based on SCr of 0.76 mg/dL).  Coag     HbA1C No results found for: \"HGBA1C\"  Blood Glucose No results found for: \"POCGLU\"  Infection     UA    Results from last 7 days   Lab Units 10/17/23  1158   NITRITE UA  Positive*   WBC UA /HPF 0-2   BACTERIA UA /HPF 3+*   SQUAM EPITHEL UA /HPF None Seen     Radiology(recent) XR Chest 1 View    Result Date: 10/17/2023  Impression: No acute process identified. Electronically Signed: Jama Steward MD  10/17/2023 11:51 AM CDT  Workstation ID: [id number]        ASSESSMENT:  -Melena  -Acute blood loss anemia  -Early satiety  -Unintentional weight loss  -Elevated LFTs  -Leukocytosis  -UTI  -Bilateral PE -on Eliquis  -History of CVA  -History of Hodgkin's lymphoma in remission  -Hypertension  -Hyperlipidemia  -History of splenectomy    PLAN:  Patient is a 63-year-old female with history of newly diagnosed bilateral PE on Eliquis, splenectomy, and Hodgkin's lymphoma in remission who presented on 10/17 with complaints of weakness.  She was found to have anemia with hemoglobin of 7.6 on admission.  GI has been asked to consult for " further evaluation.    Patient has received 1 unit PRBCs and hemoglobin is currently 9.1.  Continue to monitor H/H and transfuse as needed.  Review of records shows hemoglobin of 12 in 2023.  We will plan EGD today for further evaluation.  Maintain NPO.  Continue PPI twice daily.  Agree with Reglan.  Eliquis is on hold.  Mild elevation in LFTs noted.  Total bilirubin 0.3, alk phos 144, AST 45, ALT 55.  LFTs were normal during her admission in 2023.  We will send full liver serologies and plan right upper quadrant ultrasound.  Treatment of UTI per primary care team.  Supportive care.      I discussed the patients findings and my recommendations with the patient.  I will discuss case with Dr. Renteria and change plan accordingly.    We appreciate the referral.    Electronically signed by RBEE Robison, 10/18/23, 9:51 AM EDT.                  Electronically signed by Rupa Renteria MD at 10/18/23 1227          Physical Therapy Notes (most recent note)        Alexx Beltran, PT Student at 10/18/23 1207  Version 1 of 1      Attestation signed by Radha Ugarte, PT at 10/18/23 1252    Note reviewed and approved.     Radha Ugarte, PT                 Patient Name: Chelsea Lees  : 1960    MRN: [id number]                              Today's Date: 10/18/2023       Admit Date: 10/17/2023    Visit Dx:     ICD-10-CM ICD-9-CM   1. Anemia, unspecified type  D64.9 285.9   2. UTI (urinary tract infection), bacterial  N39.0 599.0    A49.9 041.9   3. Melena  K92.1 578.1     Patient Active Problem List   Diagnosis    History of CVA in adulthood    Hodgkin's disease in remission    HTN (hypertension)    Dysphagia    GERD (gastroesophageal reflux disease)    HLD (hyperlipidemia)    Anxiety associated with depression    Pulmonary emboli    Pulmonary embolus    Colitis    Acute UTI (urinary tract infection)    Pulmonary nodule 1 cm or greater in diameter    Adrenal nodule    Weakness generalized    Anemia    Melena      Past Medical History:   Diagnosis Date    Cancer 09/19/1985    Stroke 12/14/2018     Past Surgical History:   Procedure Laterality Date    LYMPH NODE DISSECTION  1985    abdomen and neck    SPLENECTOMY  1985      General Information       Row Name 10/18/23 1131          Physical Therapy Time and Intention    Document Type evaluation (P)   -CS     Mode of Treatment physical therapy (P)   -CS       Row Name 10/18/23 1131          General Information    Patient Profile Reviewed yes (P)   -CS     Prior Level of Function independent:;all household mobility;community mobility;gait;transfer;driving;cooking;cleaning;shopping (P)   -CS     Existing Precautions/Restrictions NPO (P)   -CS       Row Name 10/18/23 1131          Living Environment    People in Home alone (P)   -CS       Row Name 10/18/23 1131          Home Main Entrance    Number of Stairs, Main Entrance none (P)   -CS       Row Name 10/18/23 1131          Stairs Within Home, Primary    Number of Stairs, Within Home, Primary twelve (P)   Bi-Level Home, Front Door in the Middle, 6 stairs up, 6 stairs down  -CS       Row Name 10/18/23 1131          Cognition    Orientation Status (Cognition) oriented x 4 (P)   -CS       Row Name 10/18/23 1131          Safety Issues, Functional Mobility    Impairments Affecting Function (Mobility) balance;endurance/activity tolerance;range of motion (ROM);sensation/sensory awareness;strength;other (see comments) (P)   Light-headedness while standing  -CS               User Key  (r) = Recorded By, (t) = Taken By, (c) = Cosigned By      Initials Name Provider Type    Alexx Heredia PT Student PT Student                   Mobility       Row Name 10/18/23 1134          Bed Mobility    Bed Mobility bed mobility (all) activities;supine-sit;sit-supine (P)   -CS     All Activities, [place] (Bed Mobility) supervision;contact guard (P)   -CS     Supine-Sit [place] (Bed Mobility) supervision;contact guard (P)   -CS      Sit-Supine [place] (Bed Mobility) minimum assist (75% patient effort) (P)   Needed assistance getting centered in bed  -CS     Assistive Device (Bed Mobility) bed rails (P)   -CS       Row Name 10/18/23 1134          Bed-Chair Transfer    Bed-Chair [place] (Transfers) unable to assess (P)   -CS     Assistive Device (Bed-Chair Transfers) walker, front-wheeled (P)   -CS       Row Name 10/18/23 1134          Sit-Stand Transfer    Sit-Stand [place] (Transfers) supervision;contact guard (P)   -CS     Assistive Device (Sit-Stand Transfers) walker, front-wheeled (P)   -CS       Row Name 10/18/23 1134          Gait/Stairs (Locomotion)    [place] Level (Gait) unable to assess (P)   -CS     Patient was able to Ambulate no, other medical factors prevent ambulation (P)   -CS     Reason Patient was unable to Ambulate Dizziness (P)   -CS     Distance in Feet (Gait) 0 (P)   -CS               User Key  (r) = Recorded By, (t) = Taken By, (c) = Cosigned By      Initials Name Provider Type    CS Ruby, Alexx, PT Student PT Student                   Obj/Interventions       Row Name 10/18/23 1138          Range of Motion Comprehensive    General Range of Motion lower extremity range of motion deficits identified (P)   -CS     Comment, General Range of Motion Lack of hip flexion Bilaterally (P)   -CS       Row Name 10/18/23 1138          Strength Comprehensive (MMT)    General Manual Muscle Testing (MMT) Assessment no strength deficits identified (P)   -CS     Comment, General Manual Muscle Testing (MMT) Assessment BLE grossly 3/5 (P)   -CS       Row Name 10/18/23 1138          Balance    Balance Assessment sitting static balance;sitting dynamic balance;standing static balance (P)   -CS     Static Sitting Balance modified independence;supervision (P)   -CS     Dynamic Sitting Balance modified independence;contact guard (P)   -CS     Position, Sitting Balance unsupported (P)   -CS     Static Standing Balance contact  guard (P)   -CS     Position/Device Used, Standing Balance unsupported (P)   -CS       Row Name 10/18/23 1138          Sensory Assessment (Somatosensory)    Sensory Assessment (Somatosensory) sensation intact (P)   -CS               User Key  (r) = Recorded By, (t) = Taken By, (c) = Cosigned By      Initials Name Provider Type    CS Alexx Beltran PT Student PT Student                   Goals/Plan       Row Name 10/18/23 1203          Bed Mobility Goal 1 (PT)    Activity/Assistive Device (Bed Mobility Goal 1, PT) bed mobility activities, all (P)   -CS     [place] Level/Cues Needed (Bed Mobility Goal 1, PT) modified independence (P)   -CS     Time Frame (Bed Mobility Goal 1, PT) long term goal (LTG);2 weeks (P)   -CS       Row Name 10/18/23 1203          Transfer Goal 1 (PT)    Activity/Assistive Device (Transfer Goal 1, PT) sit-to-stand/stand-to-sit;walker, rolling (P)   -CS     [place] Level/Cues Needed (Transfer Goal 1, PT) contact guard required;supervision required (P)   -CS     Time Frame (Transfer Goal 1, PT) long term goal (LTG);2 weeks (P)   -CS       Row Name 10/18/23 1203          Gait Training Goal 1 (PT)    Activity/Assistive Device (Gait Training Goal 1, PT) gait (walking locomotion);walker, rolling (P)   -CS     [place] Level (Gait Training Goal 1, PT) supervision required;contact guard required (P)   -CS     Distance (Gait Training Goal 1, PT) 35 (P)   -CS       Row Name 10/18/23 1203          Therapy Assessment/Plan (PT)    Planned Therapy Interventions (PT) balance training;gait training;neuromuscular re-education;patient/family education;ROM (range of motion);strengthening;transfer training (P)   -CS               User Key  (r) = Recorded By, (t) = Taken By, (c) = Cosigned By      Initials Name Provider Type    Alexx Heredia PT Student PT Student                   Clinical Impression       Row Name 10/18/23 1142 10/18/23 1140       Pain    Pretreatment Pain Rating 0/10 - no pain (P)    -CS 0/10 - no pain (P)   -CS    Posttreatment Pain Rating 0/10 - no pain (P)   -CS 0/10 - no pain (P)   -CS      Row Name 10/18/23 1142 10/18/23 1140       Plan of Care Review    Plan of Care Reviewed With patient (P)   -CS patient (P)   -CS    Outcome Evaluation Patient identifies as a 63 y.o. F admitted to [facility] for increasing generalized weakness and lightheadedness in the past three weeks. PMH: Patient had previous visit to hospital for SOB and dysphagia. Patient also presents with history of CVA, HTN, anxiety, anemia, and a UTI. Patient reports being independent prior to the lat three weeks and being able to go to work, get groceries, do yard work, and be completely independent with no AD in the community. Patient lives in a bi-level home with the front door in between floors. No steps to get into the house but house steps to go up stairs and 6 steps to go down stairs. Patient reports living alone, but has family close by. This date, patient was able to perform supine to sit with supervision and CGAx1. While performing STS and in standing, patient reports feeling of dizziness and light-headedness. BP was measured in standing and was recorded at 53/34. Patient was sat back down on the bed and felt better. Patient returned to supine while in bed and BP was measured at 123/77. Patient will continue to see patient and recommends skilled nursing facility for discharge. (P)   -CS --      Row Name 10/18/23 1142 10/18/23 1140       Therapy Assessment/Plan (PT)    Rehab Potential (PT) good, to achieve stated therapy goals (P)   -CS good, to achieve stated therapy goals (P)   -CS    Criteria for Skilled Interventions Met (PT) yes (P)   -CS yes;meets criteria;skilled treatment is necessary (P)   -CS    Therapy Frequency (PT) 5 times/wk (P)   -CS 5 times/wk (P)   -CS    Predicted Duration of Therapy Intervention (PT) -- Until D/C (P)   -CS      Row Name 10/18/23 1142 10/18/23 1140       Vital Signs    Pre Systolic BP Rehab  114 (P)   - (P)   -CS    Pre Treatment Diastolic BP 53 (P)   -CS 53 (P)   -CS    Intra Systolic BP Rehab 53 (P)   While Standing, Sitting shortly After  -CS 53 (P)   While Standing, shortly sitting afterwards  -CS    Intra Treatment Diastolic BP 34 (P)   -CS 34 (P)   -CS    Post Systolic BP Rehab 123 (P)   While lying Supine in Bed  - (P)   -CS    Post Treatment Diastolic BP 77 (P)   -CS 77 (P)   -CS    Pretreatment Heart Rate (beats/min) 96 (P)   -CS 96 (P)   -CS    Intratreatment Heart Rate (beats/min) 123 (P)   -CS --    Posttreatment Heart Rate (beats/min) 107 (P)   - (P)   -CS    Pre SpO2 (%) 96 (P)   -CS 96 (P)   -CS    O2 Delivery Pre Treatment room air (P)   -CS room air (P)   -CS    Post SpO2 (%) 96 (P)   -CS 96 (P)   -CS    O2 Delivery Post Treatment room air (P)   -CS room air (P)   -CS    Pre Patient Position Supine (P)   -CS Supine (P)   -CS    Intra Patient Position Standing (P)   -CS Standing (P)   -CS    Post Patient Position Supine (P)   -CS Supine (P)   -CS      Row Name 10/18/23 1142 10/18/23 1140       Positioning and Restraints    Pre-Treatment Position in bed (P)   -CS in bed (P)   -CS    Post Treatment Position bed (P)   -CS bed (P)   -CS    In Bed notified nsg;supine;call light within reach;encouraged to call for assist (P)   -CS notified nsg;supine;call light within reach;encouraged to call for assist (P)   -CS              User Key  (r) = Recorded By, (t) = Taken By, (c) = Cosigned By      Initials Name Provider Type    Alexx Heredia PT Student PT Student                   Outcome Measures       Row Name 10/18/23 1205 10/18/23 0014       How much help from another person do you currently need...    Turning from your back to your side while in flat bed without using bedrails? 4 (P)   -CS 4  -DB    Moving from lying on back to sitting on the side of a flat bed without bedrails? 4 (P)   -CS 4  -DB    Moving to and from a bed to a chair (including a wheelchair)? 2 (P)   -CS  3  -DB    Standing up from a chair using your arms (e.g., wheelchair, bedside chair)? 3 (P)   -CS 3  -DB    Climbing 3-5 steps with a railing? 2 (P)   -CS 2  -DB    To walk in hospital room? 2 (P)   -CS 2  -DB    AM-PAC 6 Clicks Score (PT) 17 (P)   -CS 18  -DB    Highest level of mobility 5 --> Static standing (P)   -CS 6 --> Walked 10 steps or more  -DB      Row Name 10/18/23 1205          Functional Assessment    Outcome Measure Options AM-PAC 6 Clicks Basic Mobility (PT) (P)   -CS               User Key  (r) = Recorded By, (t) = Taken By, (c) = Cosigned By      Initials Name Provider Type    DB Yesica Ruiz, RN Registered Nurse    Alexx Heredia, PT Student PT Student                                 Physical Therapy Education       Title: PT OT SLP Therapies (Done)       Topic: Physical Therapy (Done)       Point: Mobility training (Done)       Learning Progress Summary             Patient Acceptance, E,D, VU,DU by  at 10/18/2023 1206                         Point: Body mechanics (Done)       Learning Progress Summary             Patient Acceptance, E,D, VU,DU by  at 10/18/2023 1206                         Point: Precautions (Done)       Learning Progress Summary             Patient Acceptance, E,D, VU,DU by  at 10/18/2023 1206                                         User Key       Initials Effective Dates Name Provider Type Discipline     09/26/23 -  Alexx Beltran, PT Student PT Student PT                  PT Recommendation and Plan  Planned Therapy Interventions (PT): (P) balance training, gait training, neuromuscular re-education, patient/family education, ROM (range of motion), strengthening, transfer training  Plan of Care Reviewed With: (P) patient  Outcome Evaluation: (P) Patient identifies as a 63 [facility]. [facility] and lightheadedness in the past three weeks. PMH: Patient had previous visit to hospital for SOB and dysphagia. Patient also presents with  history of CVA, HTN, anxiety, anemia, and a UTI. Patient reports being independent prior to the lat three weeks and being able to go to work, get groceries, do yard work, and be completely independent with no AD in the community. Patient lives in a bi-level home with the front door in between floors. No steps to get into the house but house steps to go up stairs and 6 steps to go down stairs. Patient reports living alone, but has family close by. This date, patient was able to perform supine to sit with supervision and CGAx1. While performing STS and in standing, patient reports feeling of dizziness and light-headedness. BP was measured in standing and was recorded at 53/34. Patient was sat back down on the bed and felt better. Patient returned to supine while in bed and BP was measured at 123/77. Patient will continue to see patient and recommends skilled nursing facility for discharge.     Time Calculation:         PT Charges       Row Name 10/18/23 1206             Time Calculation    Start Time 1036 (P)   -CS      Stop Time 1111 (P)   -CS      Time Calculation (min) 35 min (P)   -CS      PT Received On 10/18/23 (P)   -CS      PT - Next Appointment 10/19/23 (P)   -CS      PT Goal Re-Cert Due Date 11/01/23 (P)   -CS         Time Calculation- PT    Total Timed Code Minutes- PT 0 minute(s) (P)   -CS                User Key  (r) = Recorded By, (t) = Taken By, (c) = Cosigned By      Initials Name Provider Type    CS Alexx Beltran PT Student PT Student                  Therapy Charges for Today       Code Description Service Date Service Provider Modifiers Qty    [id number]  PT EVAL MOD COMPLEXITY 4 10/18/2023 Alexx Beltran PT Student GP 1            PT G-Codes  Outcome Measure Options: (P) AM-PAC 6 Clicks Basic Mobility (PT)  AM-PAC 6 Clicks Score (PT): (P) 17       Alexx Beltran PT Student  10/18/2023      Electronically signed by Radha Ugarte PT at 10/18/23 1252          Occupational Therapy Notes (most recent  note)        Davina Morrissey, OT at 10/18/23 1711          Patient Name: Chelsea Lees  : 1960    MRN: [id number]                              Today's Date: 10/18/2023       Admit Date: 10/17/2023    Visit Dx:     ICD-10-CM ICD-9-CM   1. Anemia, unspecified type  D64.9 285.9   2. UTI (urinary tract infection), bacterial  N39.0 599.0    A49.9 041.9   3. Melena  K92.1 578.1     Patient Active Problem List   Diagnosis    History of CVA in adulthood    Hodgkin's disease in remission    HTN (hypertension)    Dysphagia    GERD (gastroesophageal reflux disease)    HLD (hyperlipidemia)    Anxiety associated with depression    Pulmonary emboli    Pulmonary embolus    Colitis    Acute UTI (urinary tract infection)    Pulmonary nodule 1 cm or greater in diameter    Adrenal nodule    Weakness generalized    Anemia    Melena     Past Medical History:   Diagnosis Date    Cancer 1985    Stroke 2018     Past Surgical History:   Procedure Laterality Date    LYMPH NODE DISSECTION      abdomen and neck    SPLENECTOMY        General Information       Row Name 10/18/23 1704          OT Time and Intention    Document Type evaluation  -MS     Mode of Treatment occupational therapy  -MS       Row Name 10/18/23 1704          General Information    Patient Profile Reviewed yes  -MS     Prior Level of Function independent:;ADL's;driving;work  -MS     Existing Precautions/Restrictions fall;orthostatic hypotension  -MS     Barriers to Rehab medically complex  -MS       Row Name 10/18/23 1704          Occupational Profile    Reason for Services/Referral (Occupational Profile) Pt is a 62 y/o F admitted to [facility] 10/17/23 with c/o generalized weakness. Pt recently admitted 9/3-23 with PE, SOB, and dysphagia. Pt has since noticed black stool since dc. Pt noted to have 30 pound weight loss x1 month. CXR (-) acute. US liver (+) hepatic cyst. Pt s/p EGD 10/18/23 with no active bleeding, (+) erosive gastritis. PMHx  significant for hx CVA, hodgkin's disease in remission, dysphagia, and pulmonary nodule. At baseline pt resides alone in multilevel home with 12 stairs inside, 0 MELANIE. Pt typically independent with ADLs, no AD, drives and works full time.  -MS     Environmental Supports and Barriers (Occupational Profile) supportive family  -MS       Row Name 10/18/23 1704          Living Environment    People in Home alone  -MS       Row Name 10/18/23 1704          Home Main Entrance    Number of Stairs, Main Entrance none  -MS       Row Name 10/18/23 1704          Stairs Within Home, Primary    Number of Stairs, Within Home, Primary twelve  upon entry, 6 stairs to upstairs, 6 stairs to downstair, pt resides mainly on upstairs level  -MS       Row Name 10/18/23 1704          Cognition    Orientation Status (Cognition) oriented x 4  -MS       Row Name 10/18/23 1704          Safety Issues, Functional Mobility    Impairments Affecting Function (Mobility) balance;endurance/activity tolerance;strength  -MS               User Key  (r) = Recorded By, (t) = Taken By, (c) = Cosigned By      Initials Name Provider Type    MS Dvaina Morrissey OT Occupational Therapist                     Mobility/ADL's       Row Name 10/18/23 1705          Bed Mobility    Bed Mobility bed mobility (all) activities;supine-sit;sit-supine  -MS     Supine-Sit [place] (Bed Mobility) standby assist  -MS     Sit-Supine [place] (Bed Mobility) minimum assist (75% patient effort)  -MS     Assistive Device (Bed Mobility) bed rails;head of bed elevated  -MS     Comment, (Bed Mobility) min A with BLE  -MS       Row Name 10/18/23 1705          Transfers    Transfers sit-stand transfer  -MS     Comment, (Transfers) not safe for transfers this date d/t orthostatic hypotension  -MS       Row Name 10/18/23 1705          Bed-Chair Transfer    Bed-Chair [place] (Transfers) unable to assess  -MS       Row Name 10/18/23 1705          Sit-Stand Transfer    Sit-Stand  [place] (Transfers) unable to assess  -MS       Row Name 10/18/23 1705          Functional Mobility    Patient was able to Ambulate no, other medical factors prevent ambulation  -MS     Reason Patient was unable to Ambulate Hypotension  -MS       Row Name 10/18/23 1705          Activities of Daily Living    BADL Assessment/Intervention lower body dressing  -MS       Row Name 10/18/23 1705          Lower Body Dressing Assessment/Training    [place] Level (Lower Body Dressing) doff;socks;maximum assist (25% patient effort)  -MS     Position (Lower Body Dressing) supine  -MS               User Key  (r) = Recorded By, (t) = Taken By, (c) = Cosigned By      Initials Name Provider Type    Davina Webb OT Occupational Therapist                   Obj/Interventions       Row Name 10/18/23 1706          Sensory Assessment (Somatosensory)    Sensory Assessment (Somatosensory) UE sensation intact  -MS       Row Name 10/18/23 1706          Vision Assessment/Intervention    Visual Impairment/Limitations WFL  -MS       Row Name 10/18/23 1706          Range of Motion Comprehensive    General Range of Motion bilateral upper extremity ROM WNL  -MS       Row Name 10/18/23 1706          Strength Comprehensive (MMT)    Comment, General Manual Muscle Testing (MMT) Assessment BUE functionally 3+/5  -MS       Row Name 10/18/23 1706          Balance    Balance Assessment sitting static balance;sitting dynamic balance  -MS     Static Sitting Balance modified independence  -MS     Dynamic Sitting Balance modified independence  -MS     Position, Sitting Balance unsupported;sitting edge of bed  -MS               User Key  (r) = Recorded By, (t) = Taken By, (c) = Cosigned By      Initials Name Provider Type    Davina Webb OT Occupational Therapist                   Goals/Plan       Row Name 10/18/23 1710          Bed Mobility Goal 1 (OT)    Activity/Assistive Device (Bed Mobility Goal 1, OT) bed mobility activities, all   -MS     [place] Level/Cues Needed (Bed Mobility Goal 1, OT) modified independence  -MS     Time Frame (Bed Mobility Goal 1, OT) long term goal (LTG);2 weeks  -MS     Progress/Outcomes (Bed Mobility Goal 1, OT) new goal  -MS       Row Name 10/18/23 1710          Transfer Goal 1 (OT)    Activity/Assistive Device (Transfer Goal 1, OT) transfers, all  -MS     [place] Level/Cues Needed (Transfer Goal 1, OT) modified independence  -MS     Time Frame (Transfer Goal 1, OT) long term goal (LTG);2 weeks  -MS     Progress/Outcome (Transfer Goal 1, OT) new goal  -MS       Row Name 10/18/23 1710          Dressing Goal 1 (OT)    Activity/Device (Dressing Goal 1, OT) dressing skills, all  -MS     [place]/Cues Needed (Dressing Goal 1, OT) modified independence  -MS     Time Frame (Dressing Goal 1, OT) long term goal (LTG);2 weeks  -MS     Progress/Outcome (Dressing Goal 1, OT) new goal  -MS       Row Name 10/18/23 1710          Toileting Goal 1 (OT)    Activity/Device (Toileting Goal 1, OT) toileting skills, all  -MS     [place] Level/Cues Needed (Toileting Goal 1, OT) modified independence  -MS     Time Frame (Toileting Goal 1, OT) long term goal (LTG);2 weeks  -MS     Progress/Outcome (Toileting Goal 1, OT) new goal  -MS       Row Name 10/18/23 1710          Therapy Assessment/Plan (OT)    Planned Therapy Interventions (OT) adaptive equipment training;BADL retraining;activity tolerance training;functional balance retraining;occupation/activity based interventions;patient/caregiver education/training;transfer/mobility retraining;strengthening exercise  -MS               User Key  (r) = Recorded By, (t) = Taken By, (c) = Cosigned By      Initials Name Provider Type    Davina Webb, OT Occupational Therapist                   Clinical Impression       Row Name 10/18/23 1706          Pain Assessment    Pretreatment Pain Rating 0/10 - no pain  -MS     Posttreatment Pain Rating 0/10 - no pain  -MS       Row  Name 10/18/23 1707          Plan of Care Review    Plan of Care Reviewed With patient  -MS     Progress no change  -MS     Outcome Evaluation Pt is a 62 y/o F admitted to [facility] 10/17/23 with c/o generalized weakness. Pt recently admitted 9/3-9/5/23 with PE, SOB, and dysphagia. Pt has since noticed black stool since dc. Pt noted to have 30 pound weight loss x1 month. CXR (-) acute. US liver (+) hepatic cyst. Pt s/p EGD 10/18/23 with no active bleeding, (+) erosive gastritis. PMHx significant for hx CVA, hodgkin's disease in remission, dysphagia, and pulmonary nodule. At baseline pt resides alone in multilevel home with 12 stairs inside, 0 MELANIE. Pt typically independent with ADLs, no AD, drives and works full time. This date, pt A&Ox4, on RA, in supine and with family at bedside. Pt requires SBA for supine to sit, min A for sit to supine secondary to BLE edema. Pt requires max A to doff socks d/t limited trunk flexion and orthostatic hypotension this date. Pt orthostatic with supine to sit, therefore further activity was deferred, asymptmatic. Pt is limited to further activity, however appears to be below funcitonal baseline. OT recommend SNF when medically appropriate for d/c progress pending. OT will follow while admitted.  -MS       Row Name 10/18/23 1707          Therapy Assessment/Plan (OT)    Rehab Potential (OT) good, to achieve stated therapy goals  -MS     Criteria for Skilled Therapeutic Interventions Met (OT) yes;skilled treatment is necessary;meets criteria  -MS     Therapy Frequency (OT) 3 times/wk  -MS     Predicted Duration of Therapy Intervention (OT) until d/c  -MS       Row Name 10/18/23 1707          Therapy Plan Review/Discharge Plan (OT)    Anticipated Discharge Disposition (OT) skilled nursing facility  -MS       Row Name 10/18/23 1707          Vital Signs    Pre Systolic BP Rehab 86  -MS     Pre Treatment Diastolic BP 52  -MS     Intra Systolic BP Rehab 96  -MS     Intra Treatment Diastolic BP 32   -MS     Post Systolic BP Rehab 109  -MS     Post Treatment Diastolic BP 66  -MS     Pretreatment Heart Rate (beats/min) 98  -MS     Intratreatment Heart Rate (beats/min) 115  -MS     Pre SpO2 (%) 98  -MS     O2 Delivery Pre Treatment room air  -MS     O2 Delivery Intra Treatment room air  -MS     Post SpO2 (%) 100  -MS     O2 Delivery Post Treatment room air  -MS     Pre Patient Position Supine  -MS     Intra Patient Position Sitting  -MS     Post Patient Position Supine  -MS     Recovery Time orthostatic  -MS       Row Name 10/18/23 1707          Positioning and Restraints    Pre-Treatment Position in bed  -MS     Post Treatment Position bed  -MS     In Bed notified nsg;supine;call light within reach;encouraged to call for assist;exit alarm on  -MS               User Key  (r) = Recorded By, (t) = Taken By, (c) = Cosigned By      Initials Name Provider Type    Davina Webb, OT Occupational Therapist                   Outcome Measures       Row Name 10/18/23 1710          How much help from another is currently needed...    Putting on and taking off regular lower body clothing? 2  -MS     Bathing (including washing, rinsing, and drying) 2  -MS     Toileting (which includes using toilet bed pan or urinal) 2  -MS     Putting on and taking off regular upper body clothing 3  -MS     Taking care of personal grooming (such as brushing teeth) 4  -MS     Eating meals 4  -MS     AM-PAC 6 Clicks Score (OT) 17  -MS       Row Name 10/18/23 1205          How much help from another person do you currently need...    Turning from your back to your side while in flat bed without using bedrails? 4  -BR (r) CS (t) BR (c)     Moving from lying on back to sitting on the side of a flat bed without bedrails? 4  -BR (r) CS (t) BR (c)     Moving to and from a bed to a chair (including a wheelchair)? 2  -BR (r) CS (t) BR (c)     Standing up from a chair using your arms (e.g., wheelchair, bedside chair)? 3  -BR (r) CS (t) BR (c)      Climbing 3-5 steps with a railing? 2  -BR (r) CS (t) BR (c)     To walk in hospital room? 2  -BR (r) CS (t) BR (c)     AM-PAC 6 Clicks Score (PT) 17  -BR (r) CS (t)     Highest level of mobility 5 --> Static standing  -BR (r) CS (t)       Row Name 10/18/23 1710 10/18/23 1205       Functional Assessment    Outcome Measure Options AM-PAC 6 Clicks Daily Activity (OT)  -MS AM-PAC 6 Clicks Basic Mobility (PT)  -BR (r) CS (t) BR (c)              User Key  (r) = Recorded By, (t) = Taken By, (c) = Cosigned By      Initials Name Provider Type    MS Davina Morrissey, OT Occupational Therapist    BR Radha Ugarte, PT Physical Therapist    Alexx Heredia, PT Student PT Student                    Occupational Therapy Education       Title: PT OT SLP Therapies (Done)       Topic: Occupational Therapy (Done)       Point: Precautions (Done)       Description:   Instruct learner(s) on prescribed precautions during self-care and functional transfers.                  Learning Progress Summary             Patient Acceptance, E,TB, VU by MS at 10/18/2023 1710                         Point: Body mechanics (Done)       Description:   Instruct learner(s) on proper positioning and spine alignment during self-care, functional mobility activities and/or exercises.                  Learning Progress Summary             Patient Acceptance, E,TB, VU by MS at 10/18/2023 1710                                         User Key       Initials Effective Dates Name Provider Type Discipline    MS 07/13/22 -  Davina Morrissey, OT Occupational Therapist OT                  OT Recommendation and Plan  Planned Therapy Interventions (OT): adaptive equipment training, BADL retraining, activity tolerance training, functional balance retraining, occupation/activity based interventions, patient/caregiver education/training, transfer/mobility retraining, strengthening exercise  Therapy Frequency (OT): 3 times/wk  Plan of Care Review  Plan of Care Reviewed With:  patient  Progress: no change  Outcome Evaluation: Pt is a 64 y/o F admitted to [facility] 10/17/23 with c/o generalized weakness. Pt recently admitted 9/3-9/5/23 with PE, SOB, and dysphagia. Pt has since noticed black stool since dc. Pt noted to have 30 pound weight loss x1 month. CXR (-) acute. US liver (+) hepatic cyst. Pt s/p EGD 10/18/23 with no active bleeding, (+) erosive gastritis. PMHx significant for hx CVA, hodgkin's disease in remission, dysphagia, and pulmonary nodule. At baseline pt resides alone in multilevel home with 12 stairs inside, 0 MELANIE. Pt typically independent with ADLs, no AD, drives and works full time. This date, pt A&Ox4, on RA, in supine and with family at bedside. Pt requires SBA for supine to sit, min A for sit to supine secondary to BLE edema. Pt requires max A to doff socks d/t limited trunk flexion and orthostatic hypotension this date. Pt orthostatic with supine to sit, therefore further activity was deferred, asymptmatic. Pt is limited to further activity, however appears to be below funcitonal baseline. OT recommend SNF when medically appropriate for d/c progress pending. OT will follow while admitted.     Time Calculation:                   Davina Morrissey OT  10/18/2023    Electronically signed by Davina Morrissey OT at 10/18/23 6695

## 2023-10-20 VITALS
DIASTOLIC BLOOD PRESSURE: 55 MMHG | RESPIRATION RATE: 17 BRPM | HEIGHT: 67 IN | OXYGEN SATURATION: 97 % | BODY MASS INDEX: 27.85 KG/M2 | WEIGHT: 177.47 LBS | SYSTOLIC BLOOD PRESSURE: 101 MMHG | TEMPERATURE: 97.7 F | HEART RATE: 99 BPM

## 2023-10-20 PROBLEM — E44.0 MODERATE MALNUTRITION: Status: ACTIVE | Noted: 2023-10-20

## 2023-10-20 LAB
ANA SER QL: NEGATIVE
ANION GAP SERPL CALCULATED.3IONS-SCNC: 5 MMOL/L (ref 5–15)
BUN SERPL-MCNC: 18 MG/DL (ref 8–23)
BUN/CREAT SERPL: 27.7 (ref 7–25)
CALCIUM SPEC-SCNC: 7.2 MG/DL (ref 8.6–10.5)
CHLORIDE SERPL-SCNC: 105 MMOL/L (ref 98–107)
CO2 SERPL-SCNC: 26 MMOL/L (ref 22–29)
CREAT SERPL-MCNC: 0.65 MG/DL (ref 0.57–1)
DEPRECATED RDW RBC AUTO: 85.8 FL (ref 37–54)
EGFRCR SERPLBLD CKD-EPI 2021: 98.5 ML/MIN/1.73
EOSINOPHIL # BLD MANUAL: 0.36 10*3/MM3 (ref 0–0.4)
EOSINOPHIL NFR BLD MANUAL: 3 % (ref 0.3–6.2)
ERYTHROCYTE [DISTWIDTH] IN BLOOD BY AUTOMATED COUNT: 25.5 % (ref 12.3–15.4)
GLUCOSE SERPL-MCNC: 110 MG/DL (ref 65–99)
HCT VFR BLD AUTO: 26.6 % (ref 34–46.6)
HCT VFR BLD AUTO: 27.1 % (ref 34–46.6)
HCT VFR BLD AUTO: 28.8 % (ref 34–46.6)
HGB BLD-MCNC: 8.5 G/DL (ref 12–15.9)
HGB BLD-MCNC: 9.3 G/DL (ref 12–15.9)
HGB BLD-MCNC: 9.4 G/DL (ref 12–15.9)
HYPOCHROMIA BLD QL: ABNORMAL
LAB AP CASE REPORT: NORMAL
LYMPHOCYTES # BLD MANUAL: 6.6 10*3/MM3 (ref 0.7–3.1)
LYMPHOCYTES NFR BLD MANUAL: 4 % (ref 5–12)
MCH RBC QN AUTO: 33 PG (ref 26.6–33)
MCHC RBC AUTO-ENTMCNC: 32.1 G/DL (ref 31.5–35.7)
MCV RBC AUTO: 102.7 FL (ref 79–97)
MONOCYTES # BLD: 0.48 10*3/MM3 (ref 0.1–0.9)
NEUTROPHILS # BLD AUTO: 4.56 10*3/MM3 (ref 1.7–7)
NEUTROPHILS NFR BLD MANUAL: 38 % (ref 42.7–76)
NRBC SPEC MANUAL: 8 /100 WBC (ref 0–0.2)
PATH REPORT.FINAL DX SPEC: NORMAL
PATH REPORT.GROSS SPEC: NORMAL
PLATELET # BLD AUTO: 410 10*3/MM3 (ref 140–450)
PMV BLD AUTO: 8.4 FL (ref 6–12)
POTASSIUM SERPL-SCNC: 3.4 MMOL/L (ref 3.5–5.2)
RBC # BLD AUTO: 2.59 10*6/MM3 (ref 3.77–5.28)
SCAN SLIDE: NORMAL
SMALL PLATELETS BLD QL SMEAR: ABNORMAL
SODIUM SERPL-SCNC: 136 MMOL/L (ref 136–145)
VARIANT LYMPHS NFR BLD MANUAL: 55 % (ref 19.6–45.3)
WBC MORPH BLD: NORMAL
WBC NRBC COR # BLD: 12 10*3/MM3 (ref 3.4–10.8)

## 2023-10-20 PROCEDURE — 85018 HEMOGLOBIN: CPT | Performed by: HOSPITALIST

## 2023-10-20 PROCEDURE — 97530 THERAPEUTIC ACTIVITIES: CPT

## 2023-10-20 PROCEDURE — G0378 HOSPITAL OBSERVATION PER HR: HCPCS

## 2023-10-20 PROCEDURE — 85025 COMPLETE CBC W/AUTO DIFF WBC: CPT | Performed by: STUDENT IN AN ORGANIZED HEALTH CARE EDUCATION/TRAINING PROGRAM

## 2023-10-20 PROCEDURE — 85014 HEMATOCRIT: CPT | Performed by: HOSPITALIST

## 2023-10-20 PROCEDURE — 97110 THERAPEUTIC EXERCISES: CPT

## 2023-10-20 PROCEDURE — 80048 BASIC METABOLIC PNL TOTAL CA: CPT | Performed by: STUDENT IN AN ORGANIZED HEALTH CARE EDUCATION/TRAINING PROGRAM

## 2023-10-20 PROCEDURE — 85007 BL SMEAR W/DIFF WBC COUNT: CPT | Performed by: STUDENT IN AN ORGANIZED HEALTH CARE EDUCATION/TRAINING PROGRAM

## 2023-10-20 RX ORDER — POTASSIUM CHLORIDE 20 MEQ/1
40 TABLET, EXTENDED RELEASE ORAL EVERY 4 HOURS
Status: COMPLETED | OUTPATIENT
Start: 2023-10-20 | End: 2023-10-20

## 2023-10-20 RX ADMIN — APIXABAN 5 MG: 5 TABLET, FILM COATED ORAL at 09:45

## 2023-10-20 RX ADMIN — CETIRIZINE HYDROCHLORIDE 10 MG: 10 TABLET, FILM COATED ORAL at 09:45

## 2023-10-20 RX ADMIN — PANTOPRAZOLE SODIUM 40 MG: 40 INJECTION, POWDER, LYOPHILIZED, FOR SOLUTION INTRAVENOUS at 09:45

## 2023-10-20 RX ADMIN — METOCLOPRAMIDE 5 MG: 5 TABLET ORAL at 06:30

## 2023-10-20 RX ADMIN — POTASSIUM CHLORIDE 40 MEQ: 1500 TABLET, EXTENDED RELEASE ORAL at 05:11

## 2023-10-20 RX ADMIN — FERROUS SULFATE TAB EC 324 MG (65 MG FE EQUIVALENT) 324 MG: 324 (65 FE) TABLET DELAYED RESPONSE at 09:45

## 2023-10-20 RX ADMIN — POTASSIUM CHLORIDE 40 MEQ: 1500 TABLET, EXTENDED RELEASE ORAL at 09:45

## 2023-10-20 RX ADMIN — CLOPIDOGREL BISULFATE 75 MG: 75 TABLET ORAL at 09:45

## 2023-10-20 NOTE — THERAPY TREATMENT NOTE
"Subjective: Pt agreeable to therapeutic plan of care.    Objective:     Bed mobility - CGA/MIN  supine to sit Cga and sit to supine Min assist  Transfers - Min-A and with rolling walker  Ambulation -  N/A or Not attempted.    Therapeutic Exercise - 10 Reps B LE AROM unsupported sitting / EOB    Vitals: WNL 95/61 supine  heart rate 93 bpm                       94/58 sitting   heart rate 99 bpm                       88/56 standing heart rate 135 bpm  Pt reported dizziness but vital were not orthostatic.     Pain: 0 VAS       Education: Provided education on the importance of mobility in the acute care setting, Verbal/Tactile Cues, and Transfer Training    Assessment: Chelsea Lees presents with functional mobility impairments which indicate the need for skilled intervention. Tolerating session today without incident. Pt blood pressure were within functional limits but pt reported dizziness.  Pt may of been somewhat anxious as she did not want to fall.  Will continue to follow and progress as tolerated.     Plan/Recommendations:   Moderate Intensity Therapy recommended post-acute care. This is recommended as therapy feels the patient would require 3-4 days per week and wouldn't tolerate \"3 hour daily\" rehab intensity. SNF would be the preferred choice. If the patient does not agree to SNF, arrange HH or OP depending on home bound status. If patient is medically complex, consider LTACH.. Pt requires no DME at discharge.     Pt desires Skilled Rehab placement at discharge. Pt cooperative; agreeable to therapeutic recommendations and plan of care.     Basic Mobility 6-click:  Rollin = Total, A lot = 2, A little = 3; 4 = None  Supine>Sit:   1 = Total, A lot = 2, A little = 3; 4 = None   Sit>Stand with arms:  1 = Total, A lot = 2, A little = 3; 4 = None  Bed>Chair:   1 = Total, A lot = 2, A little = 3; 4 = None  Ambulate in room:  1 = Total, A lot = 2, A little = 3; 4 = None  3-5 Steps with railin = " Total, A lot = 2, A little = 3; 4 = None  Score: 17    Modified [place]: 4 = Moderately severe disability (Unable to attend to own bodily needs without assistance, and unable to walk unassisted)     Post-Tx Position: Supine with HOB Elevated, Alarms activated, and Call light and personal items within reach  PPE: gloves

## 2023-10-20 NOTE — PLAN OF CARE
Goal Outcome Evaluation:   Referral was made for patient to go to [facility] at discharge. Precert was started 10/19/23.

## 2023-10-20 NOTE — PLAN OF CARE
"Goal Outcome Evaluation:     Bed mobility - CGA/MIN  supine to sit Cga and sit to supine Min assist  Transfers - Min-A and with rolling walker  Ambulation -  N/A or Not attempted.    Therapeutic Exercise - 10 Reps B LE AROM unsupported sitting / EOB    Moderate Intensity Therapy recommended post-acute care. This is recommended as therapy feels the patient would require 3-4 days per week and wouldn't tolerate \"3 hour daily\" rehab intensity. SNF would be the preferred choice. If the patient does not agree to SNF, arrange HH or OP depending on home bound status. If patient is medically complex, consider LTACH.. Pt requires no DME at discharge.     Pt desires Skilled Rehab placement at discharge. Pt cooperative; agreeable to therapeutic recommendations and plan of care.                    "

## 2023-10-20 NOTE — SIGNIFICANT NOTE
10/20/23 1310   OTHER   Discipline occupational therapist   Rehab Time/Intention   Session Not Performed other (see comments)  (pt has current d/c orders, OT will follow up if pt remains admitted)   Therapy Assessment/Plan (PT)   Criteria for Skilled Interventions Met (PT) yes;meets criteria;skilled treatment is necessary   Recommendation   OT - Next Appointment 10/21/23

## 2023-10-20 NOTE — PLAN OF CARE
Problem: Adult Inpatient Plan of Care  Goal: Plan of Care Review  Outcome: Ongoing, Not Progressing  Flowsheets (Taken 10/20/2023 0249)  Progress: no change  Plan of Care Reviewed With: patient  Goal: Patient-Specific Goal (Individualized)  Outcome: Ongoing, Not Progressing  Goal: Absence of Hospital-Acquired Illness or Injury  Outcome: Ongoing, Not Progressing  Intervention: Identify and Manage Fall Risk  Recent Flowsheet Documentation  Taken 10/20/2023 0215 by Bethany Nolasco RN  Safety Promotion/Fall Prevention:   assistive device/personal items within reach   clutter free environment maintained   fall prevention program maintained   nonskid shoes/slippers when out of bed   room organization consistent   safety round/check completed  Taken 10/20/2023 0020 by Bethany Nolasco RN  Safety Promotion/Fall Prevention:   assistive device/personal items within reach   clutter free environment maintained   nonskid shoes/slippers when out of bed   room organization consistent   safety round/check completed  Taken 10/19/2023 2210 by Bethany Nolasco RN  Safety Promotion/Fall Prevention:   assistive device/personal items within reach   clutter free environment maintained   fall prevention program maintained   nonskid shoes/slippers when out of bed   room organization consistent   safety round/check completed  Taken 10/19/2023 2002 by Bethany Nolasco, RN  Safety Promotion/Fall Prevention:   assistive device/personal items within reach   clutter free environment maintained   fall prevention program maintained   nonskid shoes/slippers when out of bed   room organization consistent   safety round/check completed  Intervention: Prevent Skin Injury  Recent Flowsheet Documentation  Taken 10/19/2023 2002 by Bethany Nolasco, RN  Body Position:   supine   position changed independently  Intervention: Prevent and Manage VTE (Venous Thromboembolism) Risk  Recent Flowsheet Documentation  Taken 10/19/2023 2002 by Bethany Nolasco  RN  Activity Management: activity encouraged  VTE Prevention/Management: sequential compression devices off  Range of Motion: active ROM (range of motion) encouraged  Intervention: Prevent Infection  Recent Flowsheet Documentation  Taken 10/20/2023 0215 by Bethany Nolasco RN  Infection Prevention:   environmental surveillance performed   hand hygiene promoted   rest/sleep promoted   single patient room provided  Taken 10/20/2023 0020 by Bethany Nolasco RN  Infection Prevention:   environmental surveillance performed   hand hygiene promoted   rest/sleep promoted   single patient room provided  Taken 10/19/2023 2210 by Bethany Nolasco RN  Infection Prevention:   environmental surveillance performed   hand hygiene promoted   single patient room provided  Taken 10/19/2023 2002 by Bethany Nolasco RN  Infection Prevention:   environmental surveillance performed   hand hygiene promoted   single patient room provided   rest/sleep promoted   cohorting utilized  Goal: Optimal Comfort and Wellbeing  Outcome: Ongoing, Not Progressing  Intervention: Provide Person-Centered Care  Recent Flowsheet Documentation  Taken 10/19/2023 2002 by Bethany Nolasco RN  Trust Relationship/Rapport:   care explained   choices provided  Goal: Readiness for Transition of Care  Outcome: Ongoing, Not Progressing     Problem: Pain Acute  Goal: Acceptable Pain Control and Functional Ability  Outcome: Ongoing, Not Progressing  Intervention: Prevent or Manage Pain  Recent Flowsheet Documentation  Taken 10/19/2023 2210 by Bethany Nolasco RN  Medication Review/Management:   medications reviewed   high-risk medications identified  Taken 10/19/2023 2002 by Bethany Nolasco RN  Sensory Stimulation Regulation: television on  Bowel Elimination Promotion:   adequate fluid intake promoted   ambulation promoted  Medication Review/Management:   medications reviewed   high-risk medications identified  Intervention: Optimize Psychosocial Wellbeing  Recent Flowsheet  Documentation  Taken 10/19/2023 2002 by Bethany Nolasco RN  Supportive Measures: active listening utilized  Diversional Activities: television     Problem: Fall Injury Risk  Goal: Absence of Fall and Fall-Related Injury  Outcome: Ongoing, Not Progressing  Intervention: Identify and Manage Contributors  Recent Flowsheet Documentation  Taken 10/19/2023 2210 by Bethany Nolasco RN  Medication Review/Management:   medications reviewed   high-risk medications identified  Taken 10/19/2023 2002 by Bethany Nolasco RN  Medication Review/Management:   medications reviewed   high-risk medications identified  Self-Care Promotion: independence encouraged  Intervention: Promote Injury-Free Environment  Recent Flowsheet Documentation  Taken 10/20/2023 0215 by Bethany Nolasco RN  Safety Promotion/Fall Prevention:   assistive device/personal items within reach   clutter free environment maintained   fall prevention program maintained   nonskid shoes/slippers when out of bed   room organization consistent   safety round/check completed  Taken 10/20/2023 0020 by Bethany Nolasco RN  Safety Promotion/Fall Prevention:   assistive device/personal items within reach   clutter free environment maintained   nonskid shoes/slippers when out of bed   room organization consistent   safety round/check completed  Taken 10/19/2023 2210 by Bethany Nolasco RN  Safety Promotion/Fall Prevention:   assistive device/personal items within reach   clutter free environment maintained   fall prevention program maintained   nonskid shoes/slippers when out of bed   room organization consistent   safety round/check completed  Taken 10/19/2023 2002 by Bethany Nolasco RN  Safety Promotion/Fall Prevention:   assistive device/personal items within reach   clutter free environment maintained   fall prevention program maintained   nonskid shoes/slippers when out of bed   room organization consistent   safety round/check completed     Problem: Malnutrition  Goal:  Improved Nutritional Intake  Outcome: Ongoing, Not Progressing  Intervention: Promote and Optimize Oral Intake  Recent Flowsheet Documentation  Taken 10/19/2023 2002 by Bethany Nolasco RN  Oral Nutrition Promotion: rest periods promoted   Goal Outcome Evaluation:  Plan of Care Reviewed With: patient        Progress: no change     Alert and oriented x 4. Able to verbalize needs and wants. Takes medication whole and tolerates well. Continent of bowel and bladder. Assist x 1 for transfers/ambulation. Does not require O2 therapy at this time. Gastroenterology has signed off. PT recommending SNF placement at this time. Discharge plan: [place]. No c/o pain/discomfort/SOB noted. Currently in bed, eyes closed. Rise and fall of chest observed. Call bell in reach. Hgb trending down.

## 2023-10-20 NOTE — DISCHARGE SUMMARY
[facility] Medicine Services  Discharge Summary    Date of Service: 10/20/23  Patient Name: Chelsea Lees  : 1960  MRN: [id number]    Date of Admission: 10/17/2023  Discharge Diagnosis:   Diagnosis Plan   1. Anemia, unspecified type  Case Request    Case Request    Tissue Pathology Exam    Tissue Pathology Exam      2. UTI (urinary tract infection), bacterial        3. Melena  Case Request    Case Request    Tissue Pathology Exam    Tissue Pathology Exam         Date of Discharge: 10/20/23  Primary Care Physician: Camilla Camara APRN      Presenting Problem:   Weakness generalized [R53.1]  UTI (urinary tract infection), bacterial [N39.0, A49.9]  Anemia, unspecified type [D64.9]    Active and Resolved Hospital Problems:  Active Hospital Problems    Diagnosis POA    **Weakness generalized [R53.1] Yes    Moderate malnutrition [E44.0] Yes    Anemia [D64.9] Unknown    Melena [K92.1] Unknown    Pulmonary emboli [I26.99] Yes    Acute UTI (urinary tract infection) [N39.0] Yes    GERD (gastroesophageal reflux disease) [K21.9] Yes      Resolved Hospital Problems   No resolved problems to display.         Hospital Course     Hospital Course:  Chelsea Lees is a 63 y.o. female with history of Hodgkin lymphoma in remission, CVA, she has been recently diagnosed with PE has been on Eliquis, was discharged from the hospital  has been having some melena dark stool feeling progressively weak fatigue and tired came to the emergency room with a profound anemia.  She is denying any hematemesis no bright red blood per rectum.  She had a colonoscopy  showing sessile serrated adenomatous polyp with low-grade dysplasia.     On examination abdominous soft, nondistended minimally tender.  Hemoglobin dropped to 7.6 is now 9.1 after blood transfusion.  BUN was 31 at the time admission which is now down to 26.  Nitrite was positive.     Suspect patient may have possible NSAID induced ulcer or  erosive gastritis or peptic ulcer disease leading to a blood loss anemia being on Eliquis.  Other possibility may be a AVM.  Her hemoglobin was fairly normal a month or so ago, has progressive anemia with drop in hemoglobin since starting Eliquis.  We will proceed with upper endoscopy examination.  She has been appropriately placed on a PPI.  She is a minimal elevation in LFTs we will perform further work-up for that as well.      Generalized weakness  lightheadedness  Melena  Shortness of breath  Recent history of PE  Anemia  S/p EGD yesterday showing no active bleeding source in the upper GI tract, erosive gastritis, and small hiatal hernia.  Hemoglobin is 10.4 from 8.7.  Continue to monitor H/H and transfuse as needed.  Continue PPI.  For persistent anemia, may consider EGD/small bowel enteroscopy with repeat colonoscopy.  Okay to restart anticoagulation with recent PE.     Acute urinary tract infection  -WBC 11.8  -Patient is afebrile  -Blood cultures pending, continue to follow  -Patient denies any increased frequency or dysuria  -Urinalysis shows positive nitrite, 1+ leukocytes, 1+ protein, 2+ bilirubin, 3+ bacteria, cloudy appearance, dark yellow color  -Ceftriaxone initiated in the ED, continue     Hypokalemia  -Potassium replacement protocol ordered  -Continue to monitor labs     Bilateral lower extremity swelling  -Patient states she has not been taking full dose of Lasix because she thought it was contributing to her weakness  -Echo from 9/4/2023 showed left ejection fraction to be 56 to 60%  -Continue home dose of Lasix  -Continue to monitor     Anxiety and depression  -Continue home Zoloft     Hyperlipidemia  -Continue home Lipitor     GERD   ppis      DISCHARGE Follow Up Recommendations for labs and diagnostics:       Reasons For Change In Medications and Indications for New Medications:      Day of Discharge     Vital Signs:  Temp:  [97.7 °F (36.5 °C)-98.2 °F (36.8 °C)] 97.7 °F (36.5 °C)  Heart Rate:   [] 99  Resp:  [16-20] 17  BP: ()/(55-74) 101/55    Physical Exam:  Physical Exam   GENERAL: The patient is well developed and nontoxic.  HEENT: Nonicteric sclerae, PERRLA, EOMI. Oropharynx clear. Moist mucous membranes. Conjunctivae appear well perfused.  CHEST: Chest wall is nontender.  HEART: Regular rate and rhythm without murmurs. No MRG  LUNGS: Clear to auscultation bilaterally. No WRR  ABDOMEN: Soft, positive bowel sounds, nontender, no organomegaly.  RECTAL: Deferred.  SKIN: No rash, no excessive bruising, petechiae, or purpura.  NEUROLOGIC: Cranial nerves II-XII intact without motor/sensory deficit       Pertinent  and/or Most Recent Results     LAB RESULTS:      Lab 10/20/23  1222 10/20/23  0811 10/20/23  0249 10/19/23  1953 10/19/23  1239 10/19/23  0738 10/19/23  0108 10/18/23  2303 10/18/23  1612 10/18/23  0607 10/18/23  0053 10/17/23  1508 10/17/23  1326   WBC  --   --  12.00*  --   --   --  11.20*  --   --  11.40*  --   --  11.80*   HEMOGLOBIN 9.3* 9.4* 8.5* 8.6* 9.5*   < > 8.7*  --    < > 9.1*   < >  --  7.6*   HEMATOCRIT 28.8* 27.1* 26.6* 26.8* 28.5*   < > 26.7*  --    < > 27.3*   < >  --  23.3*   PLATELETS  --   --  410  --   --   --  455*  --   --  477*  --   --  550*   NEUTROS ABS  --   --  4.56  --   --   --  5.04  --   --  3.65  --   --  8.61*   EOS ABS  --   --  0.36  --   --   --  0.11  --   --  0.11  --   --   --    MCV  --   --  102.7*  --   --   --  97.7*  --   --  97.0  --   --  102.3*   SED RATE  --   --   --   --   --   --   --   --   --   --   --   --  <1   CRP  --   --   --   --   --   --   --   --   --   --   --   --  0.63*   LACTATE  --   --   --   --   --   --   --   --   --   --   --  0.8  --    PROTIME  --   --   --   --   --   --   --  13.4*  --   --   --   --   --     < > = values in this interval not displayed.         Lab 10/20/23  0249 10/18/23  2303 10/18/23  0607 10/17/23  1326   SODIUM 136 137 135* 134*   POTASSIUM 3.4* 4.1 4.2 3.3*   CHLORIDE 105 107 104 100    CO2 26.0 20.0* 25.0 23.0   ANION GAP 5.0 10.0 6.0 11.0   BUN 18 21 26* 31*   CREATININE 0.65 0.75 0.76 1.00   EGFR 98.5 89.0 87.6 63.0   GLUCOSE 110* 106* 91 152*   CALCIUM 7.2* 7.0* 7.4* 7.5*         Lab 10/18/23  2303 10/17/23  1326   TOTAL PROTEIN 3.8* 4.3*   ALBUMIN 1.2* 1.6*   GLOBULIN 2.6 2.7   ALT (SGPT) 48* 55*   AST (SGOT) 50* 45*   BILIRUBIN 0.2 0.3   ALK PHOS 132* 144*         Lab 10/18/23  2303 10/17/23  1326   PROBNP  --  221.9   PROTIME 13.4*  --    INR 1.25*  --              Lab 10/18/23  0607 10/17/23  1452   FERRITIN 222.00*  --    ABO TYPING  --  A   RH TYPING  --  Positive   ANTIBODY SCREEN  --  Negative         Brief Urine Lab Results  (Last result in the past 365 days)        Color   Clarity   Blood   Leuk Est   Nitrite   Protein   CREAT   Urine HCG        10/17/23 1158 Dark Yellow   Cloudy  Comment: Result checked     Negative   Small (1+)   Positive   30 mg/dL (1+)                 Microbiology Results (last 10 days)       Procedure Component Value - Date/Time    Blood Culture - Blood, Arm, Left [[id number]]  (Normal) Collected: 10/17/23 1508    Lab Status: Preliminary result Specimen: Blood from Arm, Left Updated: 10/19/23 1515     Blood Culture No growth at 2 days    Blood Culture - Blood, Arm, Left [[id number]]  (Normal) Collected: 10/17/23 1452    Lab Status: Preliminary result Specimen: Blood from Arm, Left Updated: 10/19/23 1500     Blood Culture No growth at 2 days    Respiratory Panel PCR w/COVID-19(SARS-CoV-2) MAEGAN/SANDIE/TAYLA/PAD/COR/MAD/DAVID In-House, NP Swab in UTM/VTM, 3-4 HR TAT - Swab, Nasopharynx [[id number]]  (Normal) Collected: 10/17/23 1340    Lab Status: Final result Specimen: Swab from Nasopharynx Updated: 10/17/23 1431     ADENOVIRUS, PCR Not Detected     Coronavirus 229E Not Detected     Coronavirus HKU1 Not Detected     Coronavirus NL63 Not Detected     Coronavirus OC43 Not Detected     COVID19 Not Detected     Human Metapneumovirus Not Detected     Human Rhinovirus/Enterovirus  Not Detected     Influenza A PCR Not Detected     Influenza B PCR Not Detected     Parainfluenza Virus 1 Not Detected     Parainfluenza Virus 2 Not Detected     Parainfluenza Virus 3 Not Detected     Parainfluenza Virus 4 Not Detected     RSV, PCR Not Detected     Bordetella pertussis pcr Not Detected     Bordetella parapertussis PCR Not Detected     Chlamydophila pneumoniae PCR Not Detected     Mycoplasma pneumo by PCR Not Detected    Narrative:      In the setting of a positive respiratory panel with a viral infection PLUS a negative procalcitonin without other underlying concern for bacterial infection, consider observing off antibiotics or discontinuation of antibiotics and continue supportive care. If the respiratory panel is positive for atypical bacterial infection (Bordetella pertussis, Chlamydophila pneumoniae, or Mycoplasma pneumoniae), consider antibiotic de-escalation to target atypical bacterial infection.            US Liver    Result Date: 10/18/2023  Impression: Impression: 1. Hepatic cyst, otherwise unremarkable liver ultrasound. Electronically Signed: Blayne Toledo MD  10/18/2023 12:38 PM EDT  Workstation ID: [id number]    XR Chest 1 View    Result Date: 10/17/2023  Impression: Impression: No acute process identified. Electronically Signed: Jama Steward MD  10/17/2023 11:51 AM CDT  Workstation ID: [id number]     Results for orders placed during the hospital encounter of 09/03/23    Duplex Venous Lower Extremity - Bilateral CAR    Interpretation Summary    Normal bilateral lower extremity venous duplex scan.      Results for orders placed during the hospital encounter of 09/03/23    Duplex Venous Lower Extremity - Bilateral CAR    Interpretation Summary    Normal bilateral lower extremity venous duplex scan.      Results for orders placed during the hospital encounter of 09/03/23    Adult Transthoracic Echo Complete W/ Cont if Necessary Per Protocol    Interpretation Summary    Left ventricular  systolic function is normal. Left ventricular ejection fraction appears to be 56 - 60%.    Left ventricular diastolic function is consistent with (grade I) impaired relaxation.    Estimated right ventricular systolic pressure from tricuspid regurgitation is normal (<35 mmHg).      Labs Pending at Discharge:  Pending Labs       Order Current Status    Blood Culture - Blood, Arm, Left Preliminary result    Blood Culture - Blood, Arm, Left Preliminary result            Procedures Performed  Procedure(s):  ESOPHAGOGASTRODUODENOSCOPY WITH BIOPSY X1 AREA  10/18 1314 UPPER GI ENDOSCOPY      Consults:   Consults       Date and Time Order Name Status Description    10/17/2023  3:36 PM Inpatient Gastroenterology Consult Completed     10/17/2023  2:05 PM Hospitalist (on-call MD unless specified)                Discharge Details        Discharge Medications        Continue These Medications        Instructions Start Date   acetaminophen 650 MG 8 hr tablet  Commonly known as: TYLENOL   1,300 mg, Oral, Every 8 Hours PRN      apixaban 5 MG tablet tablet  Commonly known as: ELIQUIS   5 mg, Oral, 2 Times Daily      atorvastatin 40 MG tablet  Commonly known as: LIPITOR   40 mg, Oral, Nightly      cetirizine 10 MG tablet  Commonly known as: zyrTEC   10 mg, Oral, Daily      clopidogrel 75 MG tablet  Commonly known as: PLAVIX   75 mg, Oral, Daily      furosemide 20 MG tablet  Commonly known as: LASIX   20 mg, Oral, Daily      metoclopramide 5 MG tablet  Commonly known as: REGLAN   5 mg, Oral, 3 Times Daily Before Meals      pantoprazole 40 MG EC tablet  Commonly known as: PROTONIX   40 mg, Oral, Every Early Morning      sertraline 100 MG tablet  Commonly known as: ZOLOFT   100 mg, Oral, Nightly               No Known Allergies      Discharge Disposition:   Skilled Nursing Facility (DC - External)    Diet:  Hospital:  Diet Order   Procedures    Diet: Regular/House Diet; Texture: Regular Texture (IDDSI 7); Fluid Consistency: Thin (IDDSI  0)         Discharge Activity:         CODE STATUS:  Code Status and Medical Interventions:   Ordered at: 10/17/23 1542     Level Of Support Discussed With:    Patient     Code Status (Patient has no pulse and is not breathing):    CPR (Attempt to Resuscitate)     Medical Interventions (Patient has pulse or is breathing):    Full Support         No future appointments.        Time spent on Discharge including face to face service:  35 minutes      Signature: Electronically signed by Richard Mathews MD, 10/20/23, 13:03 EDT.  [facility] Hospitalist Team

## 2023-10-20 NOTE — CASE MANAGEMENT/SOCIAL WORK
Continued Stay Note  [facility]     Patient Name: Chelsea Lees  MRN: [id number]  Today's Date: 10/20/2023    Admit Date: 10/17/2023    Plan: [facility] Accepted with ready bed today 10/20. Precert approved 10/20. PASRR approved. Nursing to call # [phone] when ready for w/c van    Discharge Plan       Row Name 10/20/23 1401       Plan    Plan [facility] Accepted with ready bed today 10/20. Precert approved 10/20. PASRR approved. Nursing to call # [phone] when ready for w/c van     Plan Comments Precert approved and CM informed Renetta with [facility] [place] and bed is ready.  CM called Juliana with [facility] W/C van transport extension [phone] and  set up w/c van. CM informed floor nurseMeredith to call # [phone] when she was ready for Ms. Lees to be picked up                  Phone communication or documentation only - no physical contact with patient or family.   Adele LINARES,RN Case Manager  [facility]  Phone: Desk- [phone] cell- [phone]

## 2023-10-20 NOTE — SIGNIFICANT NOTE
Case Management/Social Work    Patient Name:  Chelsea Lees  YOB: 1960  MRN: [id number]  Admit Date:  10/17/2023           10/20/23 1224   Post Acute Pre-Cert Documentation   Date Post Acute Pre-Cert Completed 10/20/23   All Clinicals Submitted? Yes   Response Received from Insurance? Approval   Response Communicated to:    Authorization Number: [id number]   Post Acute Pre-Cert Initiated Comment Per Availity (ICR), pre-cert approved. Valid 10/20-10/26. Auth ID: [id number]. CM updated 3C CM.           Electronically signed by:  Neda Reyes RN  10/20/23 12:25 EDT

## 2023-10-20 NOTE — CASE MANAGEMENT/SOCIAL WORK
Continued Stay Note  [facility]     Patient Name: Chelsea Lees  MRN: [id number]  Today's Date: 10/20/2023    Admit Date: 10/17/2023    Plan: [facility] accepted, skilled. Pre-cert started 10/19, pending as of 10/20. PASRR approved.   Discharge Plan       Row Name 10/20/23 0746       Plan    Plan [facility] accepted, skilled. Pre-cert started 10/19, pending as of 10/20. PASRR approved.    Plan Comments Per [employer]aility, pre-cert pending as of 10/20, under medical review.                  Neda Reyes RN, BSN    [place] [id number]  Phone: [phone]  Fax: [phone]

## 2023-10-22 LAB
BACTERIA SPEC AEROBE CULT: NORMAL
BACTERIA SPEC AEROBE CULT: NORMAL

## 2023-10-23 NOTE — CASE MANAGEMENT/SOCIAL WORK
Case Management Discharge Note      Final Note: mag[facility] Post Acute Provider List?: Yes  Post Acute Provider List: Inpatient Rehab  Delivered To: Patient  Method of Delivery: In person    Selected Continued Care - Discharged on 10/20/2023 Admission date: 10/17/2023 - Discharge disposition: Skilled Nursing Facility (DC - External)      Destination Coordination complete.      Service Provider Selected Services Address Phone Fax Patient Preferred    [facility] [address] [place] [address] [phone] --           Transportation Services  W/C Van: Mariposa Ruiz    Final Discharge Disposition Code: 03 - skilled nursing facility (SNF)

## (undated) DEVICE — BITEBLOCK ENDO W/STRAP 60F A/ LF DISP

## (undated) DEVICE — PK ENDO GI 50